# Patient Record
Sex: FEMALE | Race: WHITE | NOT HISPANIC OR LATINO | Employment: OTHER | URBAN - METROPOLITAN AREA
[De-identification: names, ages, dates, MRNs, and addresses within clinical notes are randomized per-mention and may not be internally consistent; named-entity substitution may affect disease eponyms.]

---

## 2017-01-06 DIAGNOSIS — F32.5 MAJOR DEPRESSION IN COMPLETE REMISSION (H): Primary | ICD-10-CM

## 2017-01-06 RX ORDER — QUETIAPINE FUMARATE 100 MG/1
TABLET, FILM COATED ORAL
Qty: 90 TABLET | Refills: 1 | Status: SHIPPED | OUTPATIENT
Start: 2017-01-06 | End: 2017-06-24

## 2017-01-06 NOTE — TELEPHONE ENCOUNTER
QUEtiapine (SEROQUEL) 100 MG tablet  Last Written Prescription Date: 11/16/2016  Last Fill Quantity: 90, # refills: 1  Last Office Visit with [facility], [facility] or [facility] prescribing provider: 11/03/2016       BP Readings from Last 3 Encounters:   11/03/16 108/68   04/21/16 118/82   04/05/16 120/80     Pulse Readings from Last 2 Encounters:   11/03/16 81   04/21/16 78     GLC       80   11/3/2016  WBC      7.6   4/3/2016  WBC      5.1   5/11/2011  RBC     4.57   4/3/2016  RBC     4.56   5/11/2011  HGB     14.5   4/3/2016  HCT     42.1   4/3/2016  No components found with this name: mct  MCV       92   4/3/2016  MCH     31.7   4/3/2016  MCHC     34.4   4/3/2016  RDW     13.9   4/3/2016  PLT      203   4/3/2016  CHOL      187   11/3/2016  HDL       63   11/3/2016  LDL      111   11/3/2016  TRIG       66   11/3/2016  CHOLHDLRATIO      2.6   12/4/2013

## 2017-02-02 ENCOUNTER — OFFICE VISIT (OUTPATIENT)
Dept: FAMILY MEDICINE | Facility: CLINIC | Age: 57
End: 2017-02-02
Payer: MEDICARE

## 2017-02-02 VITALS
HEART RATE: 74 BPM | RESPIRATION RATE: 16 BRPM | HEIGHT: 60 IN | WEIGHT: 106.5 LBS | BODY MASS INDEX: 20.91 KG/M2 | SYSTOLIC BLOOD PRESSURE: 112 MMHG | OXYGEN SATURATION: 98 % | DIASTOLIC BLOOD PRESSURE: 70 MMHG | TEMPERATURE: 97.1 F

## 2017-02-02 DIAGNOSIS — M72.2 PLANTAR FASCIITIS: Primary | ICD-10-CM

## 2017-02-02 PROCEDURE — 99213 OFFICE O/P EST LOW 20 MIN: CPT | Performed by: FAMILY MEDICINE

## 2017-02-02 NOTE — PATIENT INSTRUCTIONS
Good support shoes, new jel insoles (replace every 2 mo)  Gentle stretches.  No going barefoot for next few weeks at least

## 2017-02-02 NOTE — PROGRESS NOTES
SUBJECTIVE:                                                    Joann Gonzalez is a 56 year old female who presents to clinic today for the following health issues:    Pt here with her sister  Musculoskeletal problem/pain      Duration: x1 month    Description  Location: painful sores on bottom of both feet     Intensity:  severe    Accompanying signs and symptoms: tingling, warmth, swelling, redness and discoloration of feet     History  Previous similar problem: no   Previous evaluation:  none    Precipitating or alleviating factors:  Trauma or overuse: no   Aggravating factors include: walking    Therapies tried and outcome: nothing   Both feet are hurting with standing and walking.   Pain at night a stinging pain    PROBLEMS TO ADD ON...  None     Problem list and histories reviewed & adjusted, as indicated.  Additional history: as documented    Labs reviewed in EPIC  Problem list, Medication list, Allergies, and Medical/Social/Surgical histories reviewed in [facility] and updated as appropriate.    ROS:  CONSTITUTIONAL:NEGATIVE for fever, chills, change in weight  INTEGUMENTARY/SKIN: bruising sides of both heels  MUSCULOSKELETAL: POSITIVE  for bilateral heel pain     OBJECTIVE:                                                    /70 mmHg  Pulse 74  Temp(Src) 97.1  F (36.2  C) (Tympanic)  Resp 16  Ht 5' (1.524 m)  Wt 106 lb 8 oz (48.308 kg)  BMI 20.80 kg/m2  SpO2 98%  Body mass index is 20.8 kg/(m^2).  GENERAL APPEARANCE: healthy, alert and no distress  MS: extremities normal- no gross deformities noted  ORTHO: Foot Exam: Inspection:  no swelling diffuse, plantar and calcaneal  Tender::calcaneous, plantar fascia insertion   Non-tender:cuboid, navicular  Range of Motion:flexion of toes:  full, extension of toes  full    SKIN: ecchymoses - side of Lt heel, posterior Rt heel.  Callus Rt heel outside         ASSESSMENT/PLAN:                                                        ICD-10-CM    1. Plantar  fasciitis M72.2 PODIATRY/FOOT & ANKLE SURGERY REFERRAL    bilateral       Follow up with Provider - as needed.  Pt asks about pain medication.  Advised that I would not give her pain medication for this   Patient Instructions   Good support shoes, new jel insoles (replace every 2 mo)  Gentle stretches.  No going barefoot for next few weeks at least        Esau Ospina MD  [facility]

## 2017-02-02 NOTE — NURSING NOTE
Chief Complaint   Patient presents with     Musculoskeletal Problem     sores on bottom of both feet x1 month        Initial /70 mmHg  Pulse 74  Temp(Src) 97.1  F (36.2  C) (Tympanic)  Resp 16  Ht 5' (1.524 m)  Wt 106 lb 8 oz (48.308 kg)  BMI 20.80 kg/m2  SpO2 98% Estimated body mass index is 20.8 kg/(m^2) as calculated from the following:    Height as of this encounter: 5' (1.524 m).    Weight as of this encounter: 106 lb 8 oz (48.308 kg).  BP completed using cuff size: zee Mosley, CMA

## 2017-02-13 ENCOUNTER — OFFICE VISIT (OUTPATIENT)
Dept: PODIATRY | Facility: CLINIC | Age: 57
End: 2017-02-13
Payer: MEDICARE

## 2017-02-13 ENCOUNTER — HOSPITAL ENCOUNTER (OUTPATIENT)
Facility: CLINIC | Age: 57
End: 2017-02-13
Attending: COLON & RECTAL SURGERY | Admitting: COLON & RECTAL SURGERY
Payer: MEDICARE

## 2017-02-13 VITALS
HEIGHT: 60 IN | BODY MASS INDEX: 20.81 KG/M2 | SYSTOLIC BLOOD PRESSURE: 124 MMHG | DIASTOLIC BLOOD PRESSURE: 70 MMHG | WEIGHT: 106 LBS

## 2017-02-13 DIAGNOSIS — L84 CALLUS OF FOOT: ICD-10-CM

## 2017-02-13 DIAGNOSIS — M21.6X1 PRONATION OF BOTH FEET: Primary | ICD-10-CM

## 2017-02-13 DIAGNOSIS — M21.6X2 PRONATION OF BOTH FEET: Primary | ICD-10-CM

## 2017-02-13 PROCEDURE — 99203 OFFICE O/P NEW LOW 30 MIN: CPT | Performed by: PODIATRIST

## 2017-02-13 NOTE — MR AVS SNAPSHOT
After Visit Summary   2/13/2017    Joann Gonzalez    MRN: [id number]           Patient Information     Date Of Birth          1960        Visit Information        Provider Department      2/13/2017 10:30 AM Jake Nava DPM [facility]        Care Instructions    [facility] Shoes Orthotics: Super Feet,Tacco,Powersteps    Calluses, Corns, IPKs, Porokeratosis    When there is excessive friction or pressure on the skin, the body responds by making the skin thicker to protect the deeper structures from becoming exposed. While this works well to protect the deeper structures, the thickened skin can increase pressure and pain.    Flat, diffuse thickening are simple calluses and they are usually caused by friction.  Often these are the result of rubbing on a shoe or going barefoot.    Calluses with a central core between the toes are called corns. These result from prominent joints on adjacent toes rubbing together. Theses are a symptom of bone malalignment and will always recur unless the underlying bones are addressed surgically.    Calluses with a central core on the ball of the foot are usually IPKs (intractable plantar keratosis). These are caused by excessive pressure from the metatarsals, the bones that make up the ball of the foot. Often one of these bones is too long or too prominent.  Again, these will always recur unless the underlying bone issue is addressed. There is no cure for these. They will either go away by themselves, recur, or more could develop.    Regardless of what the diagnosis, most of these lesions can be kept comfortable with routine maintenance.   1. File them down with a pumice stone or callus file a couple times a week.   2. An electric callus removing device. Amope Pedi Perfect Electronic Pedicure Foot File and Callus Remover can be a good option.   3. Lotion can be applied to soften the callus. A urea based cream such as Kersal or Vanicream or  "thicker cream with shea butter are good options.  4. Toe spacers or toe covers can be used for corns, gel pads can be used for other lesions on the bottom of the foot.   If there is a surgical pathology noted, such as a prominent bone, often this needs to be addressed surgically to minimize recurrence. However, sometimes the lesion simply migrates to another spot after surgery, so it is not a guaranteed cure.                 Follow-ups after your visit        Who to contact     If you have questions or need follow up information about today's clinic visit or your schedule please contact [facility] directly at [phone].  Normal or non-critical lab and imaging results will be communicated to you by MyChart, letter or phone within 4 business days after the clinic has received the results. If you do not hear from us within 7 days, please contact the clinic through [employer] or phone. If you have a critical or abnormal lab result, we will notify you by phone as soon as possible.  Submit refill requests through [employer] or call your pharmacy and they will forward the refill request to us. Please allow 3 business days for your refill to be completed.          Additional Information About Your Visit        [employer] Information     [employer] lets you send messages to your doctor, view your test results, renew your prescriptions, schedule appointments and more. To sign up, go to [email or web address] . Click on \"Log in\" on the left side of the screen, which will take you to the Welcome page. Then click on \"Sign up Now\" on the right side of the page.     You will be asked to enter the access code listed below, as well as some personal information. Please follow the directions to create your username and password.     Your access code is: [email or web address]  Expires: 5/3/2017 11:52 AM     Your access code will  in 90 days. If you need help or a new code, please call your [place] clinic or " [phone].        Care EveryWhere ID     This is your Care EveryWhere ID. This could be used by other organizations to access your [place] medical records  [id number]        Your Vitals Were     Height BMI (Body Mass Index)                5' (1.524 m) 20.7 kg/m2           Blood Pressure from Last 3 Encounters:   02/13/17 124/70   02/02/17 112/70   11/03/16 108/68    Weight from Last 3 Encounters:   02/13/17 106 lb (48.1 kg)   02/02/17 106 lb 8 oz (48.3 kg)   11/03/16 113 lb (51.3 kg)              Today, you had the following     No orders found for display       Primary Care Provider Office Phone # Fax #    Esau Ospina [phone]       [facility] [address]  [facility] [address]        Thank you!     Thank you for choosing [facility]  for your care. Our goal is always to provide you with excellent care. Hearing back from our patients is one way we can continue to improve our services. Please take a few minutes to complete the written survey that you may receive in the mail after your visit with us. Thank you!             Your Updated Medication List - Protect others around you: Learn how to safely use, store and throw away your medicines at www.disposemymeds.org.          This list is accurate as of: 2/13/17 10:47 AM.  Always use your most recent med list.                   Brand Name Dispense Instructions for use    amLODIPine 10 MG tablet    NORVASC    90 tablet    TAKE 1 TABLET BY MOUTH EVERY DAY       cloNIDine 0.1 MG tablet    CATAPRES    360 tablet    TAKE 1 TABLET BY MOUTH TWICE DAILY. MAY INCREASE TO 2 TABLET TWICE DAILY IF BLOOD PRESSURE IS NOT BELOW 140/90       clopidogrel 75 MG tablet    PLAVIX    90 tablet    Take 1 tablet (75 mg) by mouth daily       FLUoxetine 20 MG capsule    PROzac    180 capsule    TAKE 2 CAPSULES BY MOUTH EVERY DAY       lisinopril 20 MG tablet    PRINIVIL/ZESTRIL    90 tablet    TAKE 1 TABLET BY MOUTH EVERY  DAY       LYRICA PO      Take 50 mg by mouth 2 times daily       Multi-vitamin Tabs tablet   Generic drug:  multivitamin, therapeutic with minerals      Take 1 tablet by mouth daily.       omeprazole 20 MG tablet     30 tablet    Take 1 tablet (20 mg) by mouth daily Take 30-60 minutes before a meal.       oxyCODONE-acetaminophen 5-325 MG per tablet    PERCOCET    20 tablet    Take 1 tablet by mouth every 4 hours as needed for moderate to severe pain       * QUEtiapine 25 MG tablet    SEROquel    90 tablet    TAKE ONE-HALF TABLET BY MOUTH TWICE DAILY AS DIRECTED       * QUEtiapine 100 MG tablet    SEROquel    90 tablet    TAKE 1 TABLET(100 MG) BY MOUTH AT BEDTIME       tiZANidine 4 MG tablet    ZANAFLEX    180 tablet    Take 1 tablet (4 mg) by mouth 2 times daily       traMADol 50 MG tablet    ULTRAM         vitamin D3 1000 UNITS Caps     30 capsule    Take 1 capsule by mouth daily.       * Notice:  This list has 2 medication(s) that are the same as other medications prescribed for you. Read the directions carefully, and ask your doctor or other care provider to review them with you.

## 2017-02-13 NOTE — PROGRESS NOTES
"  ASSESSMENT/PLAN:    Encounter Diagnoses   Name Primary?     Pronation of both feet Yes     Callus of foot      Although pain at night that she \"walks off,\"  I do not consider it ischemic pain. She has palpable pedal pulses bilaterally.      Pain seems to be related to the calluses, secondary to significant heel eversion and shoe wear irritation.     Recommendations:     File calluses down with a pumice stone  Foot cream for moisturizing  Over the counter inserts. My hope is that it will bring her heels into better frontal plane alignment with less stress to the skin on the lateral heel.   Cold application at bed time.        Body mass index is 20.7 kg/(m^2).      Jake Nava, SEBAS, FACFAS, MS    [facility] of Podiatry/Foot & Ankle Surgery      ____________________________________________________________________    HPI:       I was asked by Dr. Ospina to evaluate this patient regarding bilateral foot pain.     Chief Complaint: foot pain, bilateral  Onset of problem: months  Pain/ discomfort is described as:  Burning, throbbing, shooting  Ratin/10   Frequency:  dialy    The pain is made worse with walking  Previous treatment: none    She said that her pain is more at night and she gets up and \"walks it off.\"  *  Past Medical History   Diagnosis Date     Depression      Hypertension      Migraine 2013     Overdose      Stroke (H)      Unspecified cerebral artery occlusion with cerebral infarction 10/20/2012   *  *  Past Surgical History   Procedure Laterality Date     Hysterectomy       Cholecystectomy       Back surgery  2010 x 3 - Fusion     Back surgery       Hysterectomy, pap no longer indicated       hysterectomy     Gi surgery       Esophagoscopy, gastroscopy, duodenoscopy (egd), combined N/A 2015     Procedure: COMBINED ESOPHAGOSCOPY, GASTROSCOPY, DUODENOSCOPY (EGD);  Surgeon: Sree Scanlon MD;  Location:  GI     Appendectomy       done with hysterectomy     " Abdomen surgery  2008 - Gallbladder     Gastric bypass  2003     Gastric bypass     *  *  Current Outpatient Prescriptions   Medication Sig Dispense Refill     QUEtiapine (SEROQUEL) 100 MG tablet TAKE 1 TABLET(100 MG) BY MOUTH AT BEDTIME 90 tablet 1     QUEtiapine (SEROQUEL) 25 MG tablet TAKE ONE-HALF TABLET BY MOUTH TWICE DAILY AS DIRECTED 90 tablet 1     clopidogrel (PLAVIX) 75 MG tablet Take 1 tablet (75 mg) by mouth daily 90 tablet 2     FLUoxetine (PROZAC) 20 MG capsule TAKE 2 CAPSULES BY MOUTH EVERY  capsule 1     tiZANidine (ZANAFLEX) 4 MG tablet Take 1 tablet (4 mg) by mouth 2 times daily 180 tablet 0     cloNIDine (CATAPRES) 0.1 MG tablet TAKE 1 TABLET BY MOUTH TWICE DAILY. MAY INCREASE TO 2 TABLET TWICE DAILY IF BLOOD PRESSURE IS NOT BELOW 140/90 360 tablet 0     lisinopril (PRINIVIL,ZESTRIL) 20 MG tablet TAKE 1 TABLET BY MOUTH EVERY DAY 90 tablet 1     amLODIPine (NORVASC) 10 MG tablet TAKE 1 TABLET BY MOUTH EVERY DAY 90 tablet 3     oxyCODONE-acetaminophen (PERCOCET) 5-325 MG per tablet Take 1 tablet by mouth every 4 hours as needed for moderate to severe pain 20 tablet 0     traMADol (ULTRAM) 50 MG tablet        omeprazole 20 MG tablet Take 1 tablet (20 mg) by mouth daily Take 30-60 minutes before a meal. 30 tablet 1     Pregabalin (LYRICA PO) Take 50 mg by mouth 2 times daily        Cholecalciferol (VITAMIN D3) 1000 UNITS CAPS Take 1 capsule by mouth daily. 30 capsule 3     Multiple Vitamin (MULTI-VITAMIN) per tablet Take 1 tablet by mouth daily.         ROS:     A 10-point review of systems was performed and is positive for that noted in the HPI and as seen below.  All other areas are negative.     Numbness in feet?  no   Calf pain with walking? no  Recent foot/ankle injury? no  Weight change over past 12 months? 20# loss  Self perception as overweight? no  Recent flu-like symptoms? no  Joint pain other than feet ? no    Social History: Employment:  no;  Exercise/Physical activity:  no;   "Tobacco use:  yes  Social History     Social History     Marital status:      Spouse name: N/A     Number of children: N/A     Years of education: N/A     Occupational History     Not on file.     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     Drug use: No     Sexual activity: No     Other Topics Concern     Parent/Sibling W/ Cabg, Mi Or Angioplasty Before 65f 55m? No     Social History Narrative    ** Merged History Encounter **            Family history:  Family History   Problem Relation Age of Onset     Alzheimer Disease Mother      Hypertension Mother      Endocrine Disease Father        Rheumatoid arthritis:  no  Foot Problems: sibling with open blisters  Diabetes: sibling      EXAM:    Vitals: /70  Ht 5' (1.524 m)  Wt 106 lb (48.1 kg)  BMI 20.7 kg/m2  BMI: Body mass index is 20.7 kg/(m^2).  Height: 5' 0\"    Constitutional/ general:  Pt is in no apparent distress, appears well-nourished.  Cooperative with history and physical exam.     Vascular:  Pedal pulses are palpable bilaterally for both the DP and PT arteries.  CFT < 3 sec.  No edema.  Pedal hair growth noted.     Neuro:  Alert and oriented x 3. Coordinated gait.  Light touch sensation is intact to the L4, L5, S1 distributions. No obvious deficits.  No evidence of neurological-based weakness, spasticity, or contracture in the lower extremities.     Derm: Normal texture and turgor.  No erythema, ecchymosis, or cyanosis.  No open lesions.     Hyperkeratotic lesion bilateral heel, lateral side.  Tender on palpation.  These do not appear verrucoid.  No open lesion.    Musculoskeletal:    Lower extremity muscle strength is normal.  Patient is ambulatory without an assistive device or brace . Notable pronation with rearfoot eversion. "       Jake Nava DPM, FACFAS, MS    [facility] Department of Podiatry/Foot & Ankle Surgery

## 2017-02-13 NOTE — PATIENT INSTRUCTIONS
[facility] Shoes Orthotics: Super Feet,Tacco,Powersteps    Calluses, Corns, IPKs, Porokeratosis    When there is excessive friction or pressure on the skin, the body responds by making the skin thicker to protect the deeper structures from becoming exposed. While this works well to protect the deeper structures, the thickened skin can increase pressure and pain.    Flat, diffuse thickening are simple calluses and they are usually caused by friction.  Often these are the result of rubbing on a shoe or going barefoot.    Calluses with a central core between the toes are called corns. These result from prominent joints on adjacent toes rubbing together. Theses are a symptom of bone malalignment and will always recur unless the underlying bones are addressed surgically.    Calluses with a central core on the ball of the foot are usually IPKs (intractable plantar keratosis). These are caused by excessive pressure from the metatarsals, the bones that make up the ball of the foot. Often one of these bones is too long or too prominent.  Again, these will always recur unless the underlying bone issue is addressed. There is no cure for these. They will either go away by themselves, recur, or more could develop.    Regardless of what the diagnosis, most of these lesions can be kept comfortable with routine maintenance.   1. File them down with a pumice stone or callus file a couple times a week.   2. An electric callus removing device. Amope Pedi Perfect Electronic Pedicure Foot File and Callus Remover can be a good option.   3. Lotion can be applied to soften the callus. A urea based cream such as Kersal or Vanicream or thicker cream with shea butter are good options.  4. Toe spacers or toe covers can be used for corns, gel pads can be used for other lesions on the bottom of the foot.   If there is a surgical pathology noted, such as a prominent bone, often this needs to be addressed surgically to minimize recurrence. However,  sometimes the lesion simply migrates to another spot after surgery, so it is not a guaranteed cure.

## 2017-02-22 ENCOUNTER — TELEPHONE (OUTPATIENT)
Dept: FAMILY MEDICINE | Facility: CLINIC | Age: 57
End: 2017-02-22

## 2017-02-22 NOTE — TELEPHONE ENCOUNTER
Panel Management Review      Patient has the following on her problem list:     Depression / Dysthymia review  PHQ-9 SCORE 4/5/2016 4/21/2016 11/3/2016   Total Score - - -   Total Score - - -   Total Score 4 8 9      Patient is due for:  None      Composite cancer screening  Chart review shows that this patient is due/due soon for the following None  Summary:    Patient is due/failing the following:   COLONOSCOPY and MAMMOGRAM    Action needed:   Patient needs office visit for mammogram/colonoscopy.    Type of outreach:    Sent letter.    Questions for provider review:    None                                                                                                                                    Rahel Melgar LPN     Chart routed to Care Team .

## 2017-02-22 NOTE — LETTER
[facility]  [address]  [address]  [place] [address] [phone]                                                                                                           Joann Gonzalez  [address]   [place] [address]    February 22, 2017          Dear Joann Gonzalez,      We care about your well-being!  In order to ensure we are providing the best quality care, we have reviewed your chart and see that you are due for:     _____ Physical   _____ Pap Smear   __x___ Mammogram   _____ Diabetes Followup   _____ Hypertension Followup   _____ Cholesterol Followup (Provider Appointment)   _____ Cholesterol Test (Lab-Only Appointment)   __x___ Other:  Colonoscopy     We can assist you in scheduling these appointments at [phone].    If you have had (or plan to have) any of these tests done at a facility other than [facility] or a [facility], please have the results from these tests sent to your primary physician at [facility].                 Sincerely,    Esau Ospina MD

## 2017-02-24 ENCOUNTER — RADIANT APPOINTMENT (OUTPATIENT)
Dept: MAMMOGRAPHY | Facility: CLINIC | Age: 57
End: 2017-02-24
Payer: MEDICARE

## 2017-02-24 DIAGNOSIS — Z12.31 VISIT FOR SCREENING MAMMOGRAM: ICD-10-CM

## 2017-02-24 PROCEDURE — G0202 SCR MAMMO BI INCL CAD: HCPCS | Mod: TC

## 2017-03-01 ENCOUNTER — HOSPITAL ENCOUNTER (OUTPATIENT)
Dept: MAMMOGRAPHY | Facility: CLINIC | Age: 57
End: 2017-03-01
Attending: FAMILY MEDICINE
Payer: MEDICARE

## 2017-03-01 ENCOUNTER — HOSPITAL ENCOUNTER (OUTPATIENT)
Dept: MAMMOGRAPHY | Facility: CLINIC | Age: 57
Discharge: HOME | End: 2017-03-01
Attending: FAMILY MEDICINE | Admitting: FAMILY MEDICINE
Payer: MEDICARE

## 2017-03-01 DIAGNOSIS — R92.8 ABNORMAL MAMMOGRAM: ICD-10-CM

## 2017-03-01 PROCEDURE — 77061 BREAST TOMOSYNTHESIS UNI: CPT

## 2017-03-01 PROCEDURE — 76642 ULTRASOUND BREAST LIMITED: CPT | Mod: RT

## 2017-04-12 DIAGNOSIS — I10 ESSENTIAL HYPERTENSION: ICD-10-CM

## 2017-04-12 NOTE — TELEPHONE ENCOUNTER
lisinopril (PRINIVIL,ZESTRIL) 20 MG tablet      Last Written Prescription Date: 10/13/16  Last Fill Quantity: 90, # refills: 1  Last Office Visit with G, [facility] or [facility] prescribing provider: 2/2/17       Potassium   Date Value Ref Range Status   11/03/2016 3.7 3.4 - 5.3 mmol/L Final     Creatinine   Date Value Ref Range Status   11/03/2016 0.72 0.52 - 1.04 mg/dL Final     BP Readings from Last 3 Encounters:   02/13/17 124/70   02/02/17 112/70   11/03/16 108/68

## 2017-04-13 RX ORDER — LISINOPRIL 20 MG/1
TABLET ORAL
Qty: 90 TABLET | Refills: 1 | Status: SHIPPED | OUTPATIENT
Start: 2017-04-13 | End: 2018-01-26

## 2017-07-10 DIAGNOSIS — I10 HTN (HYPERTENSION): ICD-10-CM

## 2017-07-10 NOTE — TELEPHONE ENCOUNTER
amLODIPine (NORVASC) 10 MG      Last Written Prescription Date: 7/18/16  Last Fill Quantity: 90, # refills: 3    Last Office Visit with [facility], [facility] or [facility] prescribing provider:  2/13/17   Future Office Visit:        BP Readings from Last 3 Encounters:   02/13/17 124/70   02/02/17 112/70   11/03/16 108/68

## 2017-07-11 RX ORDER — AMLODIPINE BESYLATE 10 MG/1
TABLET ORAL
Qty: 90 TABLET | Refills: 1 | Status: SHIPPED | OUTPATIENT
Start: 2017-07-11 | End: 2018-01-05

## 2017-07-11 NOTE — TELEPHONE ENCOUNTER
Prescription approved per [facility] Refill Protocol.  Adrianne Irving RN- Triage [employer]

## 2017-07-31 ENCOUNTER — OFFICE VISIT (OUTPATIENT)
Dept: FAMILY MEDICINE | Facility: CLINIC | Age: 57
End: 2017-07-31
Payer: MEDICARE

## 2017-07-31 VITALS
SYSTOLIC BLOOD PRESSURE: 102 MMHG | BODY MASS INDEX: 20.12 KG/M2 | DIASTOLIC BLOOD PRESSURE: 78 MMHG | HEART RATE: 72 BPM | RESPIRATION RATE: 12 BRPM | HEIGHT: 60 IN | WEIGHT: 102.5 LBS | TEMPERATURE: 97.9 F | OXYGEN SATURATION: 100 %

## 2017-07-31 DIAGNOSIS — I10 BENIGN ESSENTIAL HYPERTENSION: ICD-10-CM

## 2017-07-31 DIAGNOSIS — G89.29 CHRONIC MIDLINE LOW BACK PAIN WITHOUT SCIATICA: ICD-10-CM

## 2017-07-31 DIAGNOSIS — F32.5 MAJOR DEPRESSIVE DISORDER, SINGLE EPISODE IN FULL REMISSION (H): ICD-10-CM

## 2017-07-31 DIAGNOSIS — Z86.0100 HISTORY OF COLONIC POLYPS: ICD-10-CM

## 2017-07-31 DIAGNOSIS — M96.1 POSTLAMINECTOMY SYNDROME, LUMBAR REGION: ICD-10-CM

## 2017-07-31 DIAGNOSIS — M54.50 CHRONIC MIDLINE LOW BACK PAIN WITHOUT SCIATICA: ICD-10-CM

## 2017-07-31 DIAGNOSIS — Z12.11 SPECIAL SCREENING FOR MALIGNANT NEOPLASMS, COLON: ICD-10-CM

## 2017-07-31 DIAGNOSIS — Z00.00 ENCOUNTER FOR ROUTINE ADULT HEALTH EXAMINATION WITHOUT ABNORMAL FINDINGS: Primary | ICD-10-CM

## 2017-07-31 DIAGNOSIS — F41.1 ANXIETY STATE: ICD-10-CM

## 2017-07-31 LAB
ALBUMIN UR-MCNC: NEGATIVE MG/DL
APPEARANCE UR: CLEAR
BILIRUB UR QL STRIP: NEGATIVE
COLOR UR AUTO: YELLOW
GLUCOSE UR STRIP-MCNC: NEGATIVE MG/DL
HGB UR QL STRIP: ABNORMAL
KETONES UR STRIP-MCNC: NEGATIVE MG/DL
LEUKOCYTE ESTERASE UR QL STRIP: NEGATIVE
NITRATE UR QL: NEGATIVE
PH UR STRIP: 5 PH (ref 5–7)
RBC #/AREA URNS AUTO: NORMAL /HPF (ref 0–2)
SP GR UR STRIP: <=1.005 (ref 1–1.03)
URN SPEC COLLECT METH UR: ABNORMAL
UROBILINOGEN UR STRIP-ACNC: 1 EU/DL (ref 0.2–1)
WBC #/AREA URNS AUTO: NORMAL /HPF (ref 0–2)

## 2017-07-31 PROCEDURE — 99396 PREV VISIT EST AGE 40-64: CPT | Performed by: FAMILY MEDICINE

## 2017-07-31 PROCEDURE — 80061 LIPID PANEL: CPT | Performed by: FAMILY MEDICINE

## 2017-07-31 PROCEDURE — 80053 COMPREHEN METABOLIC PANEL: CPT | Performed by: FAMILY MEDICINE

## 2017-07-31 PROCEDURE — 36415 COLL VENOUS BLD VENIPUNCTURE: CPT | Performed by: FAMILY MEDICINE

## 2017-07-31 PROCEDURE — 81001 URINALYSIS AUTO W/SCOPE: CPT | Performed by: FAMILY MEDICINE

## 2017-07-31 ASSESSMENT — ANXIETY QUESTIONNAIRES
6. BECOMING EASILY ANNOYED OR IRRITABLE: MORE THAN HALF THE DAYS
5. BEING SO RESTLESS THAT IT IS HARD TO SIT STILL: NOT AT ALL
7. FEELING AFRAID AS IF SOMETHING AWFUL MIGHT HAPPEN: SEVERAL DAYS
GAD7 TOTAL SCORE: 5
2. NOT BEING ABLE TO STOP OR CONTROL WORRYING: NOT AT ALL
1. FEELING NERVOUS, ANXIOUS, OR ON EDGE: SEVERAL DAYS
3. WORRYING TOO MUCH ABOUT DIFFERENT THINGS: NOT AT ALL

## 2017-07-31 ASSESSMENT — PATIENT HEALTH QUESTIONNAIRE - PHQ9: 5. POOR APPETITE OR OVEREATING: SEVERAL DAYS

## 2017-07-31 NOTE — MR AVS SNAPSHOT
After Visit Summary   7/31/2017    Joann Gonzalez    MRN: [id number]           Patient Information     Date Of Birth          1960        Visit Information        Provider Department      7/31/2017 11:00 AM Esau Ospina MD [facility]        Today's Diagnoses     Encounter for routine adult health examination without abnormal findings    -  1    Benign essential hypertension        Chronic midline low back pain without sciatica        Special screening for malignant neoplasms, colon        Postlaminectomy syndrome, lumbar region        History of colonic polyps        Major depressive disorder, single episode in full remission (H)        Anxiety state          Care Instructions      Preventive Health Recommendations  Female Ages 50 - 64    Yearly exam: See your health care provider every year in order to  o Review health changes.   o Discuss preventive care.    o Review your medicines if your doctor has prescribed any.      Get a Pap test every three years (unless you have an abnormal result and your provider advises testing more often).    If you get Pap tests with HPV test, you only need to test every 5 years, unless you have an abnormal result.     You do not need a Pap test if your uterus was removed (hysterectomy) and you have not had cancer.    You should be tested each year for STDs (sexually transmitted diseases) if you're at risk.     Have a mammogram every 1 to 2 years.    Have a colonoscopy at age 50, or have a yearly FIT test (stool test). These exams screen for colon cancer.      Have a cholesterol test every 5 years, or more often if advised.    Have a diabetes test (fasting glucose) every three years. If you are at risk for diabetes, you should have this test more often.     If you are at risk for osteoporosis (brittle bone disease), think about having a bone density scan (DEXA).    Shots: Get a flu shot each year. Get a tetanus shot every 10  years.    Nutrition:     Eat at least 5 servings of fruits and vegetables each day.    Eat whole-grain bread, whole-wheat pasta and brown rice instead of white grains and rice.    Talk to your provider about Calcium and Vitamin D.     Lifestyle    Exercise at least 150 minutes a week (30 minutes a day, 5 days a week). This will help you control your weight and prevent disease.    Limit alcohol to one drink per day.    No smoking.     Wear sunscreen to prevent skin cancer.     See your dentist every six months for an exam and cleaning.    See your eye doctor every 1 to 2 years.    Referral to [facility] Scheduling number [phone]          Follow-ups after your visit        Additional Services     GASTROENTEROLOGY ADULT REF PROCEDURE ONLY       Last Lab Result: Creatinine (mg/dL)       Date                     Value                 11/03/2016               0.72             ----------  Body mass index is 19.85 kg/(m^2).     Needed:  No  Language:  English    Patient will be contacted to schedule procedure.     Please be aware that coverage of these services is subject to the terms and limitations of your health insurance plan.  Call member services at your health plan with any benefit or coverage questions.  Any procedures must be performed at a [facility] facility OR coordinated by your clinic's referral office.    Please bring the following with you to your appointment:    (1) Any X-Rays, CTs or MRIs which have been performed.  Contact the facility where they were done to arrange for  prior to your scheduled appointment.    (2) List of current medications   (3) This referral request   (4) Any documents/labs given to you for this referral                  Who to contact     If you have questions or need follow up information about today's clinic visit or your schedule please contact [facility] directly at [phone].  Normal or non-critical lab and  "imaging results will be communicated to you by MyChart, letter or phone within 4 business days after the clinic has received the results. If you do not hear from us within 7 days, please contact the clinic through MyChart or phone. If you have a critical or abnormal lab result, we will notify you by phone as soon as possible.  Submit refill requests through [employer] or call your pharmacy and they will forward the refill request to us. Please allow 3 business days for your refill to be completed.          Additional Information About Your Visit        Care EveryWhere ID     This is your Care EveryWhere ID. This could be used by other organizations to access your [facility] medical records  [id number]        Your Vitals Were     Pulse Temperature Respirations Height Pulse Oximetry BMI (Body Mass Index)    72 97.9  F (36.6  C) (Tympanic) 12 5' 0.25\" (1.53 m) 100% 19.85 kg/m2       Blood Pressure from Last 3 Encounters:   07/31/17 102/78   02/13/17 124/70   02/02/17 112/70    Weight from Last 3 Encounters:   07/31/17 102 lb 8 oz (46.5 kg)   02/13/17 106 lb (48.1 kg)   02/02/17 106 lb 8 oz (48.3 kg)              We Performed the Following     *UA reflex to Microscopic     Comprehensive metabolic panel     GASTROENTEROLOGY ADULT REF PROCEDURE ONLY     Lipid panel reflex to direct LDL          Today's Medication Changes          These changes are accurate as of: 7/31/17 11:28 AM.  If you have any questions, ask your nurse or doctor.               These medicines have changed or have updated prescriptions.        Dose/Directions    FLUoxetine 20 MG capsule   Commonly known as:  PROzac   This may have changed:  See the new instructions.   Used for:  Major depressive disorder, single episode in full remission (H), Anxiety state   Changed by:  Esau Ospina MD        TAKE 2 CAPSULES BY MOUTH EVERY DAY   Quantity:  180 capsule   Refills:  1            Where to get your medicines      These medications were sent to [id number] - [facility] [address] AT [facility] [address]  [address], [facility] [address]     Phone:  [phone]     FLUoxetine 20 MG capsule                Primary Care Provider Office Phone # Fax #    Esau Ospina [phone]       [facility] [address]  [facility] [address]        Equal Access to Services     JUAN CARLOS SCHMIDT : Hadii aad ku hadasho Soomaali, waaxda luqadaha, qaybta kaalmada adeegyada, waxay idiin hayaan adeeg kharash la'aan . So [facility] [phone].    ATENCIÓN: Si habla español, tiene a lugo disposición servicios gratuitos de asistencia lingüística. Llame al [phone].    We comply with applicable federal civil rights laws and [place] laws. We do not discriminate on the basis of race, color, national origin, age, disability sex, sexual orientation or gender identity.            Thank you!     Thank you for choosing [facility]  for your care. Our goal is always to provide you with excellent care. Hearing back from our patients is one way we can continue to improve our services. Please take a few minutes to complete the written survey that you may receive in the mail after your visit with us. Thank you!             Your Updated Medication List - Protect others around you: Learn how to safely use, store and throw away your medicines at www.disposemymeds.org.          This list is accurate as of: 7/31/17 11:28 AM.  Always use your most recent med list.                   Brand Name Dispense Instructions for use Diagnosis    amLODIPine 10 MG tablet    NORVASC    90 tablet    TAKE 1 TABLET BY MOUTH EVERY DAY    HTN (hypertension)       cloNIDine 0.1 MG tablet    CATAPRES    360 tablet    TAKE 1 TABLET BY MOUTH TWICE DAILY. MAY INCREASE TO 2 TABLET TWICE DAILY IF BLOOD PRESSURE IS NOT BELOW 140/90    Benign essential hypertension       clopidogrel 75 MG tablet    PLAVIX    90 tablet    Take 1  tablet (75 mg) by mouth daily    Transient cerebral ischemia, unspecified type       FLUoxetine 20 MG capsule    PROzac    180 capsule    TAKE 2 CAPSULES BY MOUTH EVERY DAY    Major depressive disorder, single episode in full remission (H), Anxiety state       lisinopril 20 MG tablet    PRINIVIL/ZESTRIL    90 tablet    TAKE 1 TABLET BY MOUTH EVERY DAY    Essential hypertension       LYRICA PO      Take 50 mg by mouth 2 times daily        Multi-vitamin Tabs tablet   Generic drug:  multivitamin, therapeutic with minerals      Take 1 tablet by mouth daily.        omeprazole 20 MG tablet     30 tablet    Take 1 tablet (20 mg) by mouth daily Take 30-60 minutes before a meal.    Epigastric pain       oxyCODONE-acetaminophen 5-325 MG per tablet    PERCOCET    20 tablet    Take 1 tablet by mouth every 4 hours as needed for moderate to severe pain    Contusion of left knee, initial encounter       * QUEtiapine 25 MG tablet    SEROquel    90 tablet    TAKE ONE-HALF TABLET BY MOUTH TWICE DAILY AS DIRECTED    Major depression in complete remission (H)       * QUEtiapine 100 MG tablet    SEROquel    90 tablet    TAKE 1 TABLET(100 MG) BY MOUTH AT BEDTIME    Major depression in complete remission (H)       tiZANidine 4 MG tablet    ZANAFLEX    180 tablet    Take 1 tablet (4 mg) by mouth 2 times daily    Chronic bilateral low back pain without sciatica       traMADol 50 MG tablet    ULTRAM          vitamin D3 1000 UNITS Caps     30 capsule    Take 1 capsule by mouth daily.    Vitamin D deficiency       * Notice:  This list has 2 medication(s) that are the same as other medications prescribed for you. Read the directions carefully, and ask your doctor or other care provider to review them with you.

## 2017-07-31 NOTE — LETTER
Joann Gonzalez  [address]   [facility] [address]        August 3, 2017          Dear ,    We are writing to inform you of your test results.    Cholesterol panel shows LDL is below goal of 130 but above goal of 100  Metabolic panel is essentially normal, nothing significant  Urine tests are normal, nothing significant    Resulted Orders   Lipid panel reflex to direct LDL   Result Value Ref Range    Cholesterol 201 (H) <200 mg/dL      Comment:      Desirable:       <200 mg/dl    Triglycerides 115 <150 mg/dL      Comment:      Fasting specimen    HDL Cholesterol 63 >49 mg/dL    LDL Cholesterol Calculated 115 (H) <100 mg/dL      Comment:      Above desirable:  100-129 mg/dl   Borderline High:  130-159 mg/dL   High:             160-189 mg/dL   Very high:       >189 mg/dl      Non HDL Cholesterol 138 (H) <130 mg/dL      Comment:      Above Desirable:  130-159 mg/dl   Borderline high:  160-189 mg/dl   High:             190-219 mg/dl   Very high:       >219 mg/dl     Comprehensive metabolic panel   Result Value Ref Range    Sodium 134 133 - 144 mmol/L    Potassium 3.9 3.4 - 5.3 mmol/L    Chloride 98 94 - 109 mmol/L    Carbon Dioxide 26 20 - 32 mmol/L    Anion Gap 10 3 - 14 mmol/L    Glucose 69 (L) 70 - 99 mg/dL      Comment:      Fasting specimen    Urea Nitrogen 6 (L) 7 - 30 mg/dL    Creatinine 0.51 (L) 0.52 - 1.04 mg/dL    GFR Estimate >90  Non  GFR Calc   >60 mL/min/1.7m2    GFR Estimate If Black >90   GFR Calc   >60 mL/min/1.7m2    Calcium 9.2 8.5 - 10.1 mg/dL    Bilirubin Total 0.3 0.2 - 1.3 mg/dL    Albumin 3.7 3.4 - 5.0 g/dL    Protein Total 7.8 6.8 - 8.8 g/dL    Alkaline Phosphatase 153 (H) 40 - 150 U/L    ALT 14 0 - 50 U/L    AST 15 0 - 45 U/L   *UA reflex to Microscopic   Result Value Ref Range    Color Urine Yellow     Appearance Urine Clear     Glucose Urine Negative NEG mg/dL    Bilirubin Urine Negative NEG    Ketones Urine Negative NEG mg/dL     Specific Gravity Urine <=1.005 1.003 - 1.035    Blood Urine Trace (A) NEG    pH Urine 5.0 5.0 - 7.0 pH    Protein Albumin Urine Negative NEG mg/dL    Urobilinogen Urine 1.0 0.2 - 1.0 EU/dL    Nitrite Urine Negative NEG    Leukocyte Esterase Urine Negative NEG    Source Midstream Urine    Urine Microscopic   Result Value Ref Range    WBC Urine O - 2 0 - 2 /HPF    RBC Urine O - 2 0 - 2 /HPF       If you have any questions or concerns, please call the clinic at the number listed above.       Sincerely,        Esau Ospina MD

## 2017-07-31 NOTE — PATIENT INSTRUCTIONS

## 2017-07-31 NOTE — NURSING NOTE
"Chief Complaint   Patient presents with     Physical     fasting     Hypertension     f/u     Headache     f/u     Depression     f/u     Anxiety     f/u       Initial /78 (BP Location: Left arm, Patient Position: Chair, Cuff Size: Adult Regular)  Pulse 72  Temp 97.9  F (36.6  C) (Tympanic)  Resp 12  Ht 5' 0.25\" (1.53 m)  Wt 102 lb 8 oz (46.5 kg)  SpO2 100%  BMI 19.85 kg/m2 Estimated body mass index is 19.85 kg/(m^2) as calculated from the following:    Height as of this encounter: 5' 0.25\" (1.53 m).    Weight as of this encounter: 102 lb 8 oz (46.5 kg).  Medication Reconciliation: complete     Princess PASTOR Solis CMA      "

## 2017-08-01 LAB
ALBUMIN SERPL-MCNC: 3.7 G/DL (ref 3.4–5)
ALP SERPL-CCNC: 153 U/L (ref 40–150)
ALT SERPL W P-5'-P-CCNC: 14 U/L (ref 0–50)
ANION GAP SERPL CALCULATED.3IONS-SCNC: 10 MMOL/L (ref 3–14)
AST SERPL W P-5'-P-CCNC: 15 U/L (ref 0–45)
BILIRUB SERPL-MCNC: 0.3 MG/DL (ref 0.2–1.3)
BUN SERPL-MCNC: 6 MG/DL (ref 7–30)
CALCIUM SERPL-MCNC: 9.2 MG/DL (ref 8.5–10.1)
CHLORIDE SERPL-SCNC: 98 MMOL/L (ref 94–109)
CHOLEST SERPL-MCNC: 201 MG/DL
CO2 SERPL-SCNC: 26 MMOL/L (ref 20–32)
CREAT SERPL-MCNC: 0.51 MG/DL (ref 0.52–1.04)
GFR SERPL CREATININE-BSD FRML MDRD: ABNORMAL ML/MIN/1.7M2
GLUCOSE SERPL-MCNC: 69 MG/DL (ref 70–99)
HDLC SERPL-MCNC: 63 MG/DL
LDLC SERPL CALC-MCNC: 115 MG/DL
NONHDLC SERPL-MCNC: 138 MG/DL
POTASSIUM SERPL-SCNC: 3.9 MMOL/L (ref 3.4–5.3)
PROT SERPL-MCNC: 7.8 G/DL (ref 6.8–8.8)
SODIUM SERPL-SCNC: 134 MMOL/L (ref 133–144)
TRIGL SERPL-MCNC: 115 MG/DL

## 2017-08-01 ASSESSMENT — ANXIETY QUESTIONNAIRES: GAD7 TOTAL SCORE: 5

## 2017-08-01 ASSESSMENT — PATIENT HEALTH QUESTIONNAIRE - PHQ9: SUM OF ALL RESPONSES TO PHQ QUESTIONS 1-9: 8

## 2017-09-13 ENCOUNTER — TRANSFERRED RECORDS (OUTPATIENT)
Dept: HEALTH INFORMATION MANAGEMENT | Facility: CLINIC | Age: 57
End: 2017-09-13

## 2017-09-14 ENCOUNTER — TRANSFERRED RECORDS (OUTPATIENT)
Dept: HEALTH INFORMATION MANAGEMENT | Facility: CLINIC | Age: 57
End: 2017-09-14

## 2017-09-21 ENCOUNTER — OFFICE VISIT (OUTPATIENT)
Dept: FAMILY MEDICINE | Facility: CLINIC | Age: 57
End: 2017-09-21
Payer: MEDICARE

## 2017-09-21 VITALS
SYSTOLIC BLOOD PRESSURE: 110 MMHG | HEART RATE: 76 BPM | BODY MASS INDEX: 20.53 KG/M2 | WEIGHT: 106 LBS | DIASTOLIC BLOOD PRESSURE: 60 MMHG | TEMPERATURE: 98.6 F | RESPIRATION RATE: 16 BRPM | OXYGEN SATURATION: 98 %

## 2017-09-21 DIAGNOSIS — Z86.73 HISTORY OF CVA (CEREBROVASCULAR ACCIDENT): ICD-10-CM

## 2017-09-21 DIAGNOSIS — K43.9 VENTRAL HERNIA WITHOUT OBSTRUCTION OR GANGRENE: ICD-10-CM

## 2017-09-21 DIAGNOSIS — G89.29 CHRONIC MIDLINE LOW BACK PAIN WITHOUT SCIATICA: Primary | ICD-10-CM

## 2017-09-21 DIAGNOSIS — R26.89 BALANCE PROBLEMS: ICD-10-CM

## 2017-09-21 DIAGNOSIS — M96.1 POSTLAMINECTOMY SYNDROME, LUMBAR REGION: ICD-10-CM

## 2017-09-21 DIAGNOSIS — M54.50 CHRONIC MIDLINE LOW BACK PAIN WITHOUT SCIATICA: Primary | ICD-10-CM

## 2017-09-21 PROCEDURE — 99214 OFFICE O/P EST MOD 30 MIN: CPT | Performed by: FAMILY MEDICINE

## 2017-09-21 NOTE — PROGRESS NOTES
SUBJECTIVE:   Joann Gonzalez is a 57 year old female who presents to clinic today for the following health issues:      Forms      Duration: due end of Sept    Description (location/character/radiation): Needs to renew handicap stickers. Requesting form from provider.    Intensity:  moderate    Accompanying signs and symptoms:     History (similar episodes/previous evaluation):     Precipitating or alleviating factors: None    Therapies tried and outcome: None   Pt needs to hold on to someone often.  She does use cane at times       Abdominal wall hernia      Duration: months    Description (location/character/radiation): bulging Lt lower abdomen    Intensity:  moderate    Accompanying signs and symptoms: none    History (similar episodes/previous evaluation): worsened in past several months    Precipitating or alleviating factors: None    Therapies tried and outcome: None   When she had her colonoscopy the gastroenterologist confirmed that hernia was present  Bulges more when she lifts or carries anything    Cerebrovascular Follow-up      Patient history: ischemic cerebrovascular incident    Residual symptoms: Difficult walking    Worsened or new symptoms since last visit: No    Daily aspirin use: Yes    Hypertension controlled: Yes    Back Pain Follow Up      Description:   Location of pain:  center  Character of pain: dull ache  Pain radiation: Does not radiate  Since last visit, pain is:  unchanged  New numbness or weakness in legs, not attributed to pain:  no     Intensity: moderate    History:   Pain interferes with job: Not applicable  Therapies tried without relief: cold  Therapies tried with relief: opioids     Pt has been being followed at pain clinic      Accompanying Signs & Symptoms:  Risk of Fracture:  None  Risk of Cauda Equina:  None  Risk of Infection:  None  Risk of Cancer:  None              Problem list and histories reviewed & adjusted, as indicated.  Additional history: as documented    Labs  reviewed in EPIC    Reviewed and updated as needed this visit by clinical staffTobacco  Allergies  Meds  Problems  Med Hx  Surg Hx  Fam Hx  Soc Hx        Reviewed and updated as needed this visit by Provider  Allergies  Meds  Problems         ROS:  CONSTITUTIONAL:NEGATIVE for fever, chills, change in weight  CV: NEGATIVE for chest pain, palpitations or peripheral edema  GI: NEGATIVE for nausea, abdominal pain, heartburn, or change in bowel habits  MUSCULOSKELETAL: POSITIVE  for back pain low back  NEURO: POSITIVE for dizziness/lightheadedness and gait disturbance    OBJECTIVE:                                                    /60 (BP Location: Right arm, Patient Position: Chair, Cuff Size: Adult Regular)  Pulse 76  Temp 98.6  F (37  C) (Oral)  Resp 16  Wt 106 lb (48.1 kg)  SpO2 98%  BMI 20.53 kg/m2  Body mass index is 20.53 kg/(m^2).  GENERAL APPEARANCE: healthy, alert and no distress  CV: regular rates and rhythm, normal S1 S2, no S3 or S4 and no murmur, click or rub  ABDOMEN: soft, nontender, without hepatosplenomegaly or masses, bowel sounds normal and ventral wall hernia is present on Lt lower abdomen  MS: extremities normal- no gross deformities noted  NEURO: Normal strength and tone, mentation intact, speech normal and gait abnormal unsteady   PSYCH: mentation appears normal and affect normal/bright    Diagnostic test results:  none        ASSESSMENT/PLAN:                                                        ICD-10-CM    1. Chronic midline low back pain without sciatica M54.5     G89.29    2. Postlaminectomy syndrome, lumbar region M96.1    3. Ventral hernia without obstruction or gangrene K43.9 GENERAL SURG ADULT REFERRAL   4. History of CVA (cerebrovascular accident) 10-12 w residual Rtdominant  sided weakness Z86.73    5. Balance problems R26.89        Handicapped parking permit application completed for 3 yr thru Sept 2020 for balance and low back pain.     Follow up with Provider  - 3 mo   Patient Instructions   Use good body mechanics with lifting avoid straining      Esau Ospina MD  [facility]

## 2017-09-21 NOTE — NURSING NOTE
"Chief Complaint   Patient presents with     Forms       Initial /60 (BP Location: Right arm, Patient Position: Chair, Cuff Size: Adult Regular)  Pulse 76  Temp 98.6  F (37  C) (Oral)  Resp 16  Wt 106 lb (48.1 kg)  SpO2 98%  BMI 20.53 kg/m2 Estimated body mass index is 20.53 kg/(m^2) as calculated from the following:    Height as of 7/31/17: 5' 0.25\" (1.53 m).    Weight as of this encounter: 106 lb (48.1 kg).  Medication Reconciliation: complete     Princess PASTOR Solis CMA    Advance Care Planning 9/21/2017: ACP Review of Chart / Resources Provided:  Reviewed chart for advance care plan.  Joann Gonzalez has no plan or code status on file. Discussed available resources and provided with information. Added by Princess PASTOR Solis        "

## 2017-09-21 NOTE — MR AVS SNAPSHOT
After Visit Summary   9/21/2017    Joann Gonzalez    MRN: [id number]           Patient Information     Date Of Birth          1960        Visit Information        Provider Department      9/21/2017 4:15 PM Esau Ospina MD [facility]        Today's Diagnoses     Chronic midline low back pain without sciatica    -  1    Postlaminectomy syndrome, lumbar region        Ventral hernia without obstruction or gangrene           Follow-ups after your visit        Additional Services     GENERAL SURG ADULT REFERRAL       Your provider has referred you to: AMMON: [facility] Marilou Swann [phone]   [email or web address]    Please be aware that coverage of these services is subject to the terms and limitations of your health insurance plan.  Call member services at your health plan with any benefit or coverage questions.      Please bring the following with you to your appointment:    (1) Any X-Rays, CTs or MRIs which have been performed.  Contact the facility where they were done to arrange for  prior to your scheduled appointment.   (2) List of current medications   (3) This referral request   (4) Any documents/labs given to you for this referral                  Who to contact     If you have questions or need follow up information about today's clinic visit or your schedule please contact [facility] directly at [phone].  Normal or non-critical lab and imaging results will be communicated to you by MyChart, letter or phone within 4 business days after the clinic has received the results. If you do not hear from us within 7 days, please contact the clinic through MyChart or phone. If you have a critical or abnormal lab result, we will notify you by phone as soon as possible.  Submit refill requests through YepLike! or call your pharmacy and they will forward the refill request to us.  Please allow 3 business days for your refill to be completed.          Additional Information About Your Visit        Care EveryWhere ID     This is your Care EveryWhere ID. This could be used by other organizations to access your [place] medical records  [id number]        Your Vitals Were     Pulse Temperature Respirations Pulse Oximetry BMI (Body Mass Index)       76 98.6  F (37  C) (Oral) 16 98% 20.53 kg/m2        Blood Pressure from Last 3 Encounters:   09/21/17 110/60   07/31/17 102/78   02/13/17 124/70    Weight from Last 3 Encounters:   09/21/17 106 lb (48.1 kg)   07/31/17 102 lb 8 oz (46.5 kg)   02/13/17 106 lb (48.1 kg)              We Performed the Following     GENERAL SURG ADULT REFERRAL        Primary Care Provider Office Phone # Fax #    Esau Ospina [phone] [address] [facility] [address]        Equal Access to Services     BETH SCHMIDT : Hadii aad ku hadasho Soomaali, waaxda luqadaha, qaybta kaalmada adeegyada, waxay idiin hayalberton barry hubbard . So [facility] [phone].    ATENCIÓN: Si habla español, tiene a lugo disposición servicios gratuitos de asistencia lingüística. Llame al [phone].    We comply with applicable federal civil rights laws and [place] laws. We do not discriminate on the basis of race, color, national origin, age, disability sex, sexual orientation or gender identity.            Thank you!     [facility] Our goal is always to provide you with excellent care. Hearing back from our patients is one way we can continue to improve our services. Please take a few minutes to complete the written survey that you may receive in the mail after your visit with us. Thank you!             Your Updated Medication List - Protect others around you: Learn how to safely use, store and throw away your medicines at www.disposemymeds.org.          This list is accurate as of: 9/21/17   4:25 PM.  Always use your most recent med list.                   Brand Name Dispense Instructions for use Diagnosis    amLODIPine 10 MG tablet    NORVASC    90 tablet    TAKE 1 TABLET BY MOUTH EVERY DAY    HTN (hypertension)       cloNIDine 0.1 MG tablet    CATAPRES    360 tablet    TAKE 1 TABLET BY MOUTH TWICE DAILY. MAY INCREASE TO 2 TABLET TWICE DAILY IF BLOOD PRESSURE IS NOT BELOW 140/90    Benign essential hypertension       clopidogrel 75 MG tablet    PLAVIX    90 tablet    TAKE 1 TABLET(75 MG) BY MOUTH DAILY    Transient cerebral ischemia, unspecified type       * FLUoxetine 20 MG capsule    PROzac    180 capsule    TAKE 2 CAPSULES BY MOUTH EVERY DAY    Major depressive disorder, single episode in full remission (H), Anxiety state       * FLUoxetine 20 MG capsule    PROzac    180 capsule    TAKE 2 CAPSULES BY MOUTH EVERY DAY    Major depressive disorder, single episode in full remission (H), Anxiety state       lisinopril 20 MG tablet    PRINIVIL/ZESTRIL    90 tablet    TAKE 1 TABLET BY MOUTH EVERY DAY    Essential hypertension       LYRICA PO      Take 50 mg by mouth 2 times daily        Multi-vitamin Tabs tablet   Generic drug:  multivitamin, therapeutic with minerals      Take 1 tablet by mouth daily.        omeprazole 20 MG tablet     30 tablet    Take 1 tablet (20 mg) by mouth daily Take 30-60 minutes before a meal.    Epigastric pain       oxyCODONE-acetaminophen 5-325 MG per tablet    PERCOCET    20 tablet    Take 1 tablet by mouth every 4 hours as needed for moderate to severe pain    Contusion of left knee, initial encounter       * QUEtiapine 100 MG tablet    SEROquel    90 tablet    TAKE 1 TABLET(100 MG) BY MOUTH AT BEDTIME    Major depression in complete remission (H)       * QUEtiapine 25 MG tablet    SEROquel    90 tablet    TAKE 1/2 TABLET BY MOUTH TWICE DAILY AS DIRECTED    Major depression in complete remission (H)       * QUEtiapine 100 MG tablet    SEROquel    30 tablet    TAKE 1 TABLET BY  MOUTH EVERY NIGHT AT BEDTIME    Major depression in complete remission (H)       tiZANidine 4 MG tablet    ZANAFLEX    180 tablet    TAKE 1 TABLET(4 MG) BY MOUTH TWICE DAILY    Chronic bilateral low back pain without sciatica       traMADol 50 MG tablet    ULTRAM          vitamin D3 1000 UNITS Caps     30 capsule    Take 1 capsule by mouth daily.    Vitamin D deficiency       * Notice:  This list has 5 medication(s) that are the same as other medications prescribed for you. Read the directions carefully, and ask your doctor or other care provider to review them with you.

## 2017-10-09 DIAGNOSIS — I10 ESSENTIAL HYPERTENSION: ICD-10-CM

## 2017-10-10 RX ORDER — LISINOPRIL 20 MG/1
TABLET ORAL
Qty: 90 TABLET | Refills: 2 | Status: SHIPPED | OUTPATIENT
Start: 2017-10-10 | End: 2018-08-30

## 2017-10-10 NOTE — TELEPHONE ENCOUNTER
LISINOPRIL 20MG TABLETS      Last Written Prescription Date: 04/13/17  Last Fill Quantity: 90, # refills: 1  Last Office Visit with G, P or [facility] prescribing provider: 09/21/17       Potassium   Date Value Ref Range Status   07/31/2017 3.9 3.4 - 5.3 mmol/L Final     Creatinine   Date Value Ref Range Status   07/31/2017 0.51 (L) 0.52 - 1.04 mg/dL Final     BP Readings from Last 3 Encounters:   09/21/17 110/60   07/31/17 102/78   02/13/17 124/70

## 2017-10-19 ENCOUNTER — ALLIED HEALTH/NURSE VISIT (OUTPATIENT)
Dept: NURSING | Facility: CLINIC | Age: 57
End: 2017-10-19
Payer: MEDICARE

## 2017-10-19 DIAGNOSIS — Z23 NEED FOR PROPHYLACTIC VACCINATION AND INOCULATION AGAINST INFLUENZA: Primary | ICD-10-CM

## 2017-10-19 DIAGNOSIS — F32.5 MAJOR DEPRESSION IN COMPLETE REMISSION (H): ICD-10-CM

## 2017-10-19 LAB
BASOPHILS # BLD AUTO: 0 10E9/L (ref 0–0.2)
BASOPHILS NFR BLD AUTO: 0.5 %
DIFFERENTIAL METHOD BLD: NORMAL
EOSINOPHIL # BLD AUTO: 0.1 10E9/L (ref 0–0.7)
EOSINOPHIL NFR BLD AUTO: 1.2 %
ERYTHROCYTE [DISTWIDTH] IN BLOOD BY AUTOMATED COUNT: 14.3 % (ref 10–15)
HCT VFR BLD AUTO: 43.6 % (ref 35–47)
HGB BLD-MCNC: 14.8 G/DL (ref 11.7–15.7)
LYMPHOCYTES # BLD AUTO: 1.4 10E9/L (ref 0.8–5.3)
LYMPHOCYTES NFR BLD AUTO: 24.4 %
MCH RBC QN AUTO: 31.4 PG (ref 26.5–33)
MCHC RBC AUTO-ENTMCNC: 33.9 G/DL (ref 31.5–36.5)
MCV RBC AUTO: 93 FL (ref 78–100)
MONOCYTES # BLD AUTO: 0.4 10E9/L (ref 0–1.3)
MONOCYTES NFR BLD AUTO: 7 %
NEUTROPHILS # BLD AUTO: 3.9 10E9/L (ref 1.6–8.3)
NEUTROPHILS NFR BLD AUTO: 66.9 %
PLATELET # BLD AUTO: 260 10E9/L (ref 150–450)
RBC # BLD AUTO: 4.71 10E12/L (ref 3.8–5.2)
WBC # BLD AUTO: 5.9 10E9/L (ref 4–11)

## 2017-10-19 PROCEDURE — 99207 ZZC NO CHARGE NURSE ONLY: CPT

## 2017-10-19 PROCEDURE — 85025 COMPLETE CBC W/AUTO DIFF WBC: CPT | Performed by: FAMILY MEDICINE

## 2017-10-19 PROCEDURE — G0008 ADMIN INFLUENZA VIRUS VAC: HCPCS

## 2017-10-19 PROCEDURE — 36415 COLL VENOUS BLD VENIPUNCTURE: CPT | Performed by: FAMILY MEDICINE

## 2017-10-19 PROCEDURE — 90686 IIV4 VACC NO PRSV 0.5 ML IM: CPT

## 2017-10-19 NOTE — MR AVS SNAPSHOT
After Visit Summary   10/19/2017    Joann Gonzalez    MRN: [id number]           Patient Information     Date Of Birth          1960        Visit Information        Provider Department      10/19/2017 1:30 PM BX NURSE [facility]        Today's Diagnoses     Need for prophylactic vaccination and inoculation against influenza    -  1       Follow-ups after your visit        Your next 10 appointments already scheduled     Oct 19, 2017  1:30 PM CDT   Nurse Only with BX NURSE   [facility] ([facility])    [address] [place] [id number] [phone]              Who to contact     If you have questions or need follow up information about today's clinic visit or your schedule please contact [facility] directly at [phone].  Normal or non-critical lab and imaging results will be communicated to you by MyChart, letter or phone within 4 business days after the clinic has received the results. If you do not hear from us within 7 days, please contact the clinic through MyChart or phone. If you have a critical or abnormal lab result, we will notify you by phone as soon as possible.  Submit refill requests through [employer] Week[employer] or call your pharmacy and they will forward the refill request to us. Please allow 3 business days for your refill to be completed.          Additional Information About Your Visit        Care EveryWhere ID     This is your Care EveryWhere ID. This could be used by other organizations to access your [place] medical records  [id number]         Blood Pressure from Last 3 Encounters:   09/21/17 110/60   07/31/17 102/78   02/13/17 124/70    Weight from Last 3 Encounters:   09/21/17 106 lb (48.1 kg)   07/31/17 102 lb 8 oz (46.5 kg)   02/13/17 106 lb (48.1 kg)              We Performed the Following     FLU VAC, SPLIT VIRUS IM > 3 YO  (QUADRIVALENT) [23765]     Vaccine Administration, Initial [89690]        Primary Care Provider Office Phone # Fax #    Esau Ospina [phone] [address] [facility] [address]        Equal Access to Services     JUAN CARLOS SCHMIDT : Hadii aad ku hadasho Soomaali, waaxda luqadaha, qaybta kaalmada adeegyada, waxay idiin hayaan adeharris khfritzedilia laale . So [facility] [phone].    ATENCIÓN: Si habla español, tiene a lugo disposición servicios gratuitos de asistencia lingüística. Husam al [phone].    We comply with applicable federal civil rights laws and [place] laws. We do not discriminate on the basis of race, color, national origin, age, disability, sex, sexual orientation, or gender identity.            Thank you!     Thank you for choosing [facility]  for your care. Our goal is always to provide you with excellent care. Hearing back from our patients is one way we can continue to improve our services. Please take a few minutes to complete the written survey that you may receive in the mail after your visit with us. Thank you!             Your Updated Medication List - Protect others around you: Learn how to safely use, store and throw away your medicines at www.disposemymeds.org.          This list is accurate as of: 10/19/17  1:18 PM.  Always use your most recent med list.                   Brand Name Dispense Instructions for use Diagnosis    amLODIPine 10 MG tablet    NORVASC    90 tablet    TAKE 1 TABLET BY MOUTH EVERY DAY    HTN (hypertension)       cloNIDine 0.1 MG tablet    CATAPRES    360 tablet    TAKE 1 TABLET BY MOUTH TWICE DAILY. MAY INCREASE TO 2 TABLET TWICE DAILY IF BLOOD PRESSURE IS NOT BELOW 140/90    Benign essential hypertension       clopidogrel 75 MG tablet    PLAVIX    90 tablet    TAKE 1 TABLET(75 MG) BY MOUTH DAILY    Transient cerebral ischemia, unspecified type       * FLUoxetine 20 MG capsule    PROzac    180 capsule    TAKE 2  CAPSULES BY MOUTH EVERY DAY    Major depressive disorder, single episode in full remission (H), Anxiety state       * FLUoxetine 20 MG capsule    PROzac    180 capsule    TAKE 2 CAPSULES BY MOUTH EVERY DAY    Major depressive disorder, single episode in full remission (H), Anxiety state       * lisinopril 20 MG tablet    PRINIVIL/ZESTRIL    90 tablet    TAKE 1 TABLET BY MOUTH EVERY DAY    Essential hypertension       * lisinopril 20 MG tablet    PRINIVIL/ZESTRIL    90 tablet    TAKE 1 TABLET BY MOUTH EVERY DAY    Essential hypertension       LYRICA PO      Take 50 mg by mouth 2 times daily        Multi-vitamin Tabs tablet   Generic drug:  multivitamin, therapeutic with minerals      Take 1 tablet by mouth daily.        omeprazole 20 MG tablet     30 tablet    Take 1 tablet (20 mg) by mouth daily Take 30-60 minutes before a meal.    Epigastric pain       oxyCODONE-acetaminophen 5-325 MG per tablet    PERCOCET    20 tablet    Take 1 tablet by mouth every 4 hours as needed for moderate to severe pain    Contusion of left knee, initial encounter       * QUEtiapine 100 MG tablet    SEROquel    90 tablet    TAKE 1 TABLET(100 MG) BY MOUTH AT BEDTIME    Major depression in complete remission (H)       * QUEtiapine 25 MG tablet    SEROquel    90 tablet    TAKE 1/2 TABLET BY MOUTH TWICE DAILY AS DIRECTED    Major depression in complete remission (H)       * QUEtiapine 100 MG tablet    SEROquel    30 tablet    TAKE 1 TABLET BY MOUTH EVERY NIGHT AT BEDTIME    Major depression in complete remission (H)       tiZANidine 4 MG tablet    ZANAFLEX    180 tablet    TAKE 1 TABLET(4 MG) BY MOUTH TWICE DAILY    Chronic bilateral low back pain without sciatica       traMADol 50 MG tablet    ULTRAM          vitamin D3 1000 UNITS Caps     30 capsule    Take 1 capsule by mouth daily.    Vitamin D deficiency       * Notice:  This list has 7 medication(s) that are the same as other medications prescribed for you. Read the directions carefully,  and ask your doctor or other care provider to review them with you.

## 2017-10-19 NOTE — PROGRESS NOTES

## 2017-11-04 DIAGNOSIS — F32.5 MAJOR DEPRESSION IN COMPLETE REMISSION (H): ICD-10-CM

## 2017-11-07 RX ORDER — QUETIAPINE FUMARATE 100 MG/1
TABLET, FILM COATED ORAL
Qty: 90 TABLET | Refills: 0 | Status: SHIPPED | OUTPATIENT
Start: 2017-11-07 | End: 2018-08-30

## 2018-01-05 DIAGNOSIS — I10 BENIGN ESSENTIAL HYPERTENSION: Primary | ICD-10-CM

## 2018-01-09 RX ORDER — AMLODIPINE BESYLATE 10 MG/1
TABLET ORAL
Qty: 90 TABLET | Refills: 1 | Status: SHIPPED | OUTPATIENT
Start: 2018-01-09 | End: 2018-07-01

## 2018-01-09 NOTE — TELEPHONE ENCOUNTER
"Requested Prescriptions   Pending Prescriptions Disp Refills     amLODIPine (NORVASC) 10 MG tablet [Pharmacy Med Name: AMLODIPINE BESYLATE 10MG TABLETS]  Last Written Prescription Date:  7/11/2017  Last Fill Quantity: 90 tablet,  # refills: 1   Last Office Visit  9/21/2017        with  [facility], P or [facility] prescribing provider:     Future Office Visit:    90 tablet 0     Sig: TAKE 1 TABLET BY MOUTH EVERY DAY    Calcium Channel Blockers Protocol  Failed    1/5/2018 12:43 PM       Failed - Normal serum creatinine on file in past 12 months    Recent Labs   Lab Test  07/31/17   1127   CR  0.51*          Passed - Blood pressure under 140/90    BP Readings from Last 3 Encounters:   09/21/17 110/60   07/31/17 102/78   02/13/17 124/70          Passed - Recent or future visit with authorizing provider    Patient had office visit in the last year or has a visit in the next 30 days with authorizing provider.  See \"Patient Info\" tab in inbasket, or \"Choose Columns\" in Meds & Orders section of the refill encounter.        Passed - Patient is age 18 or older       Passed - No active pregnancy on record       Passed - No positive pregnancy test in past 12 months          "

## 2018-01-26 ENCOUNTER — OFFICE VISIT (OUTPATIENT)
Dept: FAMILY MEDICINE | Facility: CLINIC | Age: 58
End: 2018-01-26
Payer: MEDICARE

## 2018-01-26 VITALS
TEMPERATURE: 100 F | SYSTOLIC BLOOD PRESSURE: 110 MMHG | OXYGEN SATURATION: 98 % | HEART RATE: 97 BPM | HEIGHT: 61 IN | WEIGHT: 118.5 LBS | RESPIRATION RATE: 16 BRPM | DIASTOLIC BLOOD PRESSURE: 70 MMHG | BODY MASS INDEX: 22.37 KG/M2

## 2018-01-26 DIAGNOSIS — F41.1 ANXIETY STATE: ICD-10-CM

## 2018-01-26 DIAGNOSIS — R07.0 THROAT PAIN: ICD-10-CM

## 2018-01-26 DIAGNOSIS — F32.5 MAJOR DEPRESSIVE DISORDER, SINGLE EPISODE IN FULL REMISSION (H): ICD-10-CM

## 2018-01-26 DIAGNOSIS — J20.9 ACUTE BRONCHITIS, UNSPECIFIED ORGANISM: Primary | ICD-10-CM

## 2018-01-26 LAB
DEPRECATED S PYO AG THROAT QL EIA: NORMAL
SPECIMEN SOURCE: NORMAL

## 2018-01-26 PROCEDURE — 87880 STREP A ASSAY W/OPTIC: CPT | Performed by: FAMILY MEDICINE

## 2018-01-26 PROCEDURE — 99213 OFFICE O/P EST LOW 20 MIN: CPT | Performed by: FAMILY MEDICINE

## 2018-01-26 PROCEDURE — 87081 CULTURE SCREEN ONLY: CPT | Performed by: FAMILY MEDICINE

## 2018-01-26 RX ORDER — AZITHROMYCIN 250 MG/1
TABLET, FILM COATED ORAL
Qty: 6 TABLET | Refills: 0 | Status: SHIPPED | OUTPATIENT
Start: 2018-01-26 | End: 2018-08-30

## 2018-01-26 NOTE — MR AVS SNAPSHOT
"              After Visit Summary   1/26/2018    Joann Gonzalez    MRN: [id number]           Patient Information     Date Of Birth          1960        Visit Information        Provider Department      1/26/2018 10:30 AM Esau Ospina MD [facility]        Today's Diagnoses     Acute bronchitis, unspecified organism    -  1    Major depressive disorder, single episode in full remission (H)        Anxiety state        Throat pain          Care Instructions    Symptomatic treatment.  Will use saline gargles, tylenol and/or advil. Suck on  lozenges as needed. Push fluids. Salt water nasal spray as needed.  Use expectorant such as Mucinex or Robitussin for cough          Follow-ups after your visit        Follow-up notes from your care team     Return if symptoms worsen or fail to improve.      Who to contact     If you have questions or need follow up information about today's clinic visit or your schedule please contact [facility] directly at [phone].  Normal or non-critical lab and imaging results will be communicated to you by LoopPayhart, letter or phone within 4 business days after the clinic has received the results. If you do not hear from us within 7 days, please contact the clinic through LoopPayhart or phone. If you have a critical or abnormal lab result, we will notify you by phone as soon as possible.  Submit refill requests through OX MEDIA or call your pharmacy and they will forward the refill request to us. Please allow 3 business days for your refill to be completed.          Additional Information About Your Visit        LoopPayhart Information     OX MEDIA lets you send messages to your doctor, view your test results, renew your prescriptions, schedule appointments and more. To sign up, go to [email or web address] . Click on \"Log in\" on the left side of the screen, which will take you to the Welcome page. Then click on \"Sign up Now\" on the " "right side of the page.     You will be asked to enter the access code listed below, as well as some personal information. Please follow the directions to create your username and password.     Your access code is: [email or web address]  Expires: 2018 10:46 AM     Your access code will  in 90 days. If you need help or a new code, please call your [facility] or [phone].        Care EveryWhere ID     This is your Care EveryWhere ID. This could be used by other organizations to access your [place] medical records  [id number]        Your Vitals Were     Pulse Temperature Respirations Height Pulse Oximetry BMI (Body Mass Index)    97 100  F (37.8  C) (Tympanic) 16 5' 1\" (1.549 m) 98% 22.39 kg/m2       Blood Pressure from Last 3 Encounters:   18 110/70   17 110/60   17 102/78    Weight from Last 3 Encounters:   18 118 lb 8 oz (53.8 kg)   17 106 lb (48.1 kg)   17 102 lb 8 oz (46.5 kg)              We Performed the Following     Beta strep group A culture     Strep, Rapid Screen          Today's Medication Changes          These changes are accurate as of 18 11:11 AM.  If you have any questions, ask your nurse or doctor.               Start taking these medicines.        Dose/Directions    azithromycin 250 MG tablet   Commonly known as:  ZITHROMAX   Used for:  Acute bronchitis, unspecified organism   Started by:  Esau Ospina MD        Two tablets first day, then one tablet daily for four days.   Quantity:  6 tablet   Refills:  0            Where to get your medicines      [facility] [address] [place] [address] [address] AT [facility] [address] [facility] [id number]     Phone:  [phone]     azithromycin 250 MG tablet    FLUoxetine 20 MG capsule                Primary Care Provider Office Phone # Fax #    Esau Ospina [phone] [address] [facility] [address] [place] " [place] [address]        Equal Access to Services     [facility] : Hadii jose e becerril xena Saucedo, waaxda luqadaha, qaybta kaalmada henry, leigh ann hernandezfritzedilia novak. So [facility] [phone].    ATENCIÓN: Si habla español, tiene a lugo disposición servicios gratuitos de asistencia lingüística. Husam al [phone].    We comply with applicable federal civil rights laws and [place] laws. We do not discriminate on the basis of race, color, national origin, age, disability, sex, sexual orientation, or gender identity.            Thank you!     Thank you for choosing [facility]  for your care. Our goal is always to provide you with excellent care. Hearing back from our patients is one way we can continue to improve our services. Please take a few minutes to complete the written survey that you may receive in the mail after your visit with us. Thank you!             Your Updated Medication List - Protect others around you: Learn how to safely use, store and throw away your medicines at www.disposemymeds.org.          This list is accurate as of 1/26/18 11:11 AM.  Always use your most recent med list.                   Brand Name Dispense Instructions for use Diagnosis    amLODIPine 10 MG tablet    NORVASC    90 tablet    TAKE 1 TABLET BY MOUTH EVERY DAY    Benign essential hypertension       azithromycin 250 MG tablet    ZITHROMAX    6 tablet    Two tablets first day, then one tablet daily for four days.    Acute bronchitis, unspecified organism       cloNIDine 0.1 MG tablet    CATAPRES    360 tablet    TAKE 1 TABLET BY MOUTH TWICE DAILY. MAY INCREASE TO 2 TABLET TWICE DAILY IF BLOOD PRESSURE IS NOT BELOW 140/90    Benign essential hypertension       clopidogrel 75 MG tablet    PLAVIX    90 tablet    TAKE 1 TABLET(75 MG) BY MOUTH DAILY    Transient cerebral ischemia, unspecified type       FLUoxetine 20 MG capsule    PROzac    180 capsule    TAKE 2 CAPSULES BY MOUTH EVERY DAY     Major depressive disorder, single episode in full remission (H), Anxiety state       lisinopril 20 MG tablet    PRINIVIL/ZESTRIL    90 tablet    TAKE 1 TABLET BY MOUTH EVERY DAY    Essential hypertension       LYRICA PO      Take 50 mg by mouth 2 times daily        Multi-vitamin Tabs tablet   Generic drug:  multivitamin, therapeutic with minerals      Take 1 tablet by mouth daily.        omeprazole 20 MG tablet     30 tablet    Take 1 tablet (20 mg) by mouth daily Take 30-60 minutes before a meal.    Epigastric pain       oxyCODONE-acetaminophen 5-325 MG per tablet    PERCOCET    20 tablet    Take 1 tablet by mouth every 4 hours as needed for moderate to severe pain    Contusion of left knee, initial encounter       QUEtiapine 100 MG tablet    SEROquel    90 tablet    TAKE 1 TABLET BY MOUTH EVERY NIGHT AT BEDTIME    Major depression in complete remission (H)       tiZANidine 4 MG tablet    ZANAFLEX    180 tablet    TAKE 1 TABLET(4 MG) BY MOUTH TWICE DAILY    Chronic bilateral low back pain without sciatica       vitamin D3 1000 UNITS Caps     30 capsule    Take 1 capsule by mouth daily.    Vitamin D deficiency

## 2018-01-26 NOTE — NURSING NOTE
"Chief Complaint   Patient presents with     URI     cough       Initial /70 (BP Location: Left arm, Patient Position: Sitting, Cuff Size: Adult Regular)  Pulse 97  Temp 100  F (37.8  C) (Tympanic)  Resp 16  Ht 5' 1\" (1.549 m)  Wt 118 lb 8 oz (53.8 kg)  SpO2 98%  BMI 22.39 kg/m2 Estimated body mass index is 22.39 kg/(m^2) as calculated from the following:    Height as of this encounter: 5' 1\" (1.549 m).    Weight as of this encounter: 118 lb 8 oz (53.8 kg).  Medication Reconciliation: complete     Princess PASTOR Solis CMA      "

## 2018-01-26 NOTE — PROGRESS NOTES
SUBJECTIVE:   Joann Gonzalez is a 57 year old female who presents to clinic today for the following health issues:      RESPIRATORY SYMPTOMS      Duration: 2-3 days    Description  nasal congestion, sore throat and cough (brown phlegm)    Severity: moderate    Accompanying signs and symptoms: see below    History (predisposing factors):  Recently quit smoking Jan 1st    Precipitating or alleviating factors: Cough worse at night    Therapies tried and outcome:  Cough drops    Pt quit smoking as of end of November 2017    ENT Symptoms             Symptoms: cc Present Absent Comment   Fever/Chills   X    Fatigue  X     Muscle Aches   X    Eye Irritation   X    Sneezing   X    Nasal Carlos/Drg  X     Sinus Pressure/Pain  X     Loss of smell   X    Dental pain   X    Sore Throat  X     Swollen Glands  X     Ear Pain/Fullness   X    Cough  X     Wheeze   X    Chest Pain   X    Shortness of breath  X     Rash   X    Other         Symptom duration:  2-3 days   Symptom severity:  moderate   Treatments tried:  cough drops   Contacts:  None         Depression and Anxiety Follow-Up    Status since last visit: Worsened-recently quit smoking Jan 1st     Other associated symptoms:irritible    Complicating factors:     Significant life event: Quit smoking     Current substance abuse: None    PHQ-9 5/10/2017 7/31/2017 10/17/2017   Total Score 8 8 6   Q9: Suicide Ideation Not at all Not at all Not at all     PAU-7 SCORE 2/25/2016 4/21/2016 7/31/2017   Total Score - - -   Total Score 4 5 5   Total Score - - -     In the past two weeks have you had thoughts of suicide or self-harm?  No.    Do you have concerns about your personal safety or the safety of others?   No  PHQ-9  English  PHQ-9   Any Language  PAU-7  Suicide Assessment Five-step Evaluation and Treatment (SAFE-T)    Problem list and histories reviewed & adjusted, as indicated.  Additional history: as documented    Labs reviewed in EPIC    Reviewed and updated as needed this  "visit by clinical staff       Reviewed and updated as needed this visit by Provider         ROS:  CONSTITUTIONAL:NEGATIVE for fever, chills, change in weight  ENT/MOUTH: POSITIVE for nasal congestion, postnasal drainage and sore throat  RESP:POSITIVE for cough-productive and wheezing  PSYCHIATRIC: POSITIVE forHx depression    OBJECTIVE:                                                    /70 (BP Location: Left arm, Patient Position: Sitting, Cuff Size: Adult Regular)  Pulse 97  Temp 100  F (37.8  C) (Tympanic)  Resp 16  Ht 5' 1\" (1.549 m)  Wt 118 lb 8 oz (53.8 kg)  SpO2 98%  BMI 22.39 kg/m2  Body mass index is 22.39 kg/(m^2).  GENERAL APPEARANCE: healthy, alert and no distress  HENT: ear canals and TM's normal, nose and mouth without ulcers or lesions, nasal mucosa edematous without rhinorrhea, oral mucous membranes moist and oropharynx clear  NECK: no adenopathy, no asymmetry, masses, or scars and thyroid normal to palpation  RESP: rhonchi R mid posterior and L mid posterior  PSYCH: mentation appears normal and affect flat    Diagnostic test results:  Strep screen - Negative     ASSESSMENT/PLAN:                                                        ICD-10-CM    1. Acute bronchitis, unspecified organism J20.9 azithromycin (ZITHROMAX) 250 MG tablet     Beta strep group A culture   2. Major depressive disorder, single episode in full remission (H) F32.5 FLUoxetine (PROZAC) 20 MG capsule   3. Anxiety state F41.1 FLUoxetine (PROZAC) 20 MG capsule   4. Throat pain R07.0 Strep, Rapid Screen       Follow up with Provider - as needed    Patient Instructions   Symptomatic treatment.  Will use saline gargles, tylenol and/or advil. Suck on  lozenges as needed. Push fluids. Salt water nasal spray as needed.  Use expectorant such as Mucinex or Robitussin for cough      Esau Ospina MD  [facility]      .soapp    "

## 2018-01-27 LAB
BACTERIA SPEC CULT: NORMAL
SPECIMEN SOURCE: NORMAL

## 2018-06-30 DIAGNOSIS — I10 ESSENTIAL HYPERTENSION: ICD-10-CM

## 2018-07-02 NOTE — TELEPHONE ENCOUNTER
"Requested Prescriptions   Pending Prescriptions Disp Refills     lisinopril (PRINIVIL/ZESTRIL) 20 MG tablet [Pharmacy Med Name: LISINOPRIL 20MG TABLETS] 90 tablet 0     Sig: TAKE 1 TABLET BY MOUTH EVERY DAY    ACE Inhibitors (Including Combos) Protocol Failed    6/30/2018  3:10 PM       Failed - Normal serum creatinine on file in past 12 months    Recent Labs   Lab Test  07/31/17   1127   CR  0.51*            Passed - Blood pressure under 140/90 in past 12 months    BP Readings from Last 3 Encounters:   01/26/18 110/70   09/21/17 110/60   07/31/17 102/78                Passed - Recent (12 mo) or future (30 days) visit within the authorizing provider's specialty    Patient had office visit in the last 12 months or has a visit in the next 30 days with authorizing provider or within the authorizing provider's specialty.  See \"Patient Info\" tab in inbasket, or \"Choose Columns\" in Meds & Orders section of the refill encounter.           Passed - Patient is age 18 or older       Passed - No active pregnancy on record       Passed - Normal serum potassium on file in past 12 months    Recent Labs   Lab Test  07/31/17   1127   POTASSIUM  3.9            Passed - No positive pregnancy test in past 12 months      Lisinopril 20mg      Last Written Prescription Date:  10/10/2017  Last Fill Quantity: 90,   # refills: 2  Last Office Visit: 1/26/2018  Future Office visit: none        "

## 2018-07-03 RX ORDER — LISINOPRIL 20 MG/1
TABLET ORAL
Qty: 90 TABLET | Refills: 0 | Status: SHIPPED | OUTPATIENT
Start: 2018-07-03 | End: 2018-08-30

## 2018-08-30 ENCOUNTER — OFFICE VISIT (OUTPATIENT)
Dept: FAMILY MEDICINE | Facility: CLINIC | Age: 58
End: 2018-08-30
Payer: MEDICARE

## 2018-08-30 VITALS
WEIGHT: 124 LBS | DIASTOLIC BLOOD PRESSURE: 76 MMHG | SYSTOLIC BLOOD PRESSURE: 112 MMHG | OXYGEN SATURATION: 97 % | TEMPERATURE: 97.3 F | BODY MASS INDEX: 23.41 KG/M2 | HEART RATE: 80 BPM | HEIGHT: 61 IN

## 2018-08-30 DIAGNOSIS — F41.1 ANXIETY STATE: ICD-10-CM

## 2018-08-30 DIAGNOSIS — M54.50 CHRONIC MIDLINE LOW BACK PAIN WITHOUT SCIATICA: ICD-10-CM

## 2018-08-30 DIAGNOSIS — I10 ESSENTIAL HYPERTENSION: ICD-10-CM

## 2018-08-30 DIAGNOSIS — I10 BENIGN ESSENTIAL HYPERTENSION: ICD-10-CM

## 2018-08-30 DIAGNOSIS — Z00.01 ENCOUNTER FOR ROUTINE ADULT HEALTH EXAMINATION WITH ABNORMAL FINDINGS: Primary | ICD-10-CM

## 2018-08-30 DIAGNOSIS — M96.1 POSTLAMINECTOMY SYNDROME, LUMBAR REGION: ICD-10-CM

## 2018-08-30 DIAGNOSIS — R26.89 BALANCE PROBLEMS: ICD-10-CM

## 2018-08-30 DIAGNOSIS — K43.9 VENTRAL HERNIA WITHOUT OBSTRUCTION OR GANGRENE: ICD-10-CM

## 2018-08-30 DIAGNOSIS — G89.29 CHRONIC MIDLINE LOW BACK PAIN WITHOUT SCIATICA: ICD-10-CM

## 2018-08-30 DIAGNOSIS — F32.5 MAJOR DEPRESSIVE DISORDER, SINGLE EPISODE IN FULL REMISSION (H): ICD-10-CM

## 2018-08-30 DIAGNOSIS — Z23 ENCOUNTER FOR IMMUNIZATION: ICD-10-CM

## 2018-08-30 LAB
ALBUMIN SERPL-MCNC: 3.5 G/DL (ref 3.4–5)
ALBUMIN UR-MCNC: NEGATIVE MG/DL
ALP SERPL-CCNC: 133 U/L (ref 40–150)
ALT SERPL W P-5'-P-CCNC: 15 U/L (ref 0–50)
ANION GAP SERPL CALCULATED.3IONS-SCNC: 9 MMOL/L (ref 3–14)
APPEARANCE UR: CLEAR
AST SERPL W P-5'-P-CCNC: 19 U/L (ref 0–45)
BILIRUB SERPL-MCNC: 0.3 MG/DL (ref 0.2–1.3)
BILIRUB UR QL STRIP: NEGATIVE
BUN SERPL-MCNC: 6 MG/DL (ref 7–30)
CALCIUM SERPL-MCNC: 8.7 MG/DL (ref 8.5–10.1)
CHLORIDE SERPL-SCNC: 105 MMOL/L (ref 94–109)
CHOLEST SERPL-MCNC: 186 MG/DL
CO2 SERPL-SCNC: 27 MMOL/L (ref 20–32)
COLOR UR AUTO: YELLOW
CREAT SERPL-MCNC: 0.59 MG/DL (ref 0.52–1.04)
ERYTHROCYTE [DISTWIDTH] IN BLOOD BY AUTOMATED COUNT: 14 % (ref 10–15)
GFR SERPL CREATININE-BSD FRML MDRD: >90 ML/MIN/1.7M2
GLUCOSE SERPL-MCNC: 76 MG/DL (ref 70–99)
GLUCOSE UR STRIP-MCNC: NEGATIVE MG/DL
HCT VFR BLD AUTO: 41.9 % (ref 35–47)
HDLC SERPL-MCNC: 61 MG/DL
HGB BLD-MCNC: 14 G/DL (ref 11.7–15.7)
HGB UR QL STRIP: ABNORMAL
KETONES UR STRIP-MCNC: NEGATIVE MG/DL
LDLC SERPL CALC-MCNC: 103 MG/DL
LEUKOCYTE ESTERASE UR QL STRIP: ABNORMAL
MCH RBC QN AUTO: 30.4 PG (ref 26.5–33)
MCHC RBC AUTO-ENTMCNC: 33.4 G/DL (ref 31.5–36.5)
MCV RBC AUTO: 91 FL (ref 78–100)
NITRATE UR QL: NEGATIVE
NON-SQ EPI CELLS #/AREA URNS LPF: ABNORMAL /LPF
NONHDLC SERPL-MCNC: 125 MG/DL
PH UR STRIP: 5.5 PH (ref 5–7)
PLATELET # BLD AUTO: 213 10E9/L (ref 150–450)
POTASSIUM SERPL-SCNC: 3.5 MMOL/L (ref 3.4–5.3)
PROT SERPL-MCNC: 7.1 G/DL (ref 6.8–8.8)
RBC # BLD AUTO: 4.6 10E12/L (ref 3.8–5.2)
RBC #/AREA URNS AUTO: ABNORMAL /HPF
SODIUM SERPL-SCNC: 141 MMOL/L (ref 133–144)
SOURCE: ABNORMAL
SP GR UR STRIP: 1.01 (ref 1–1.03)
TRIGL SERPL-MCNC: 111 MG/DL
TSH SERPL DL<=0.005 MIU/L-ACNC: 1.27 MU/L (ref 0.4–4)
UROBILINOGEN UR STRIP-ACNC: 0.2 EU/DL (ref 0.2–1)
WBC # BLD AUTO: 4.5 10E9/L (ref 4–11)
WBC #/AREA URNS AUTO: ABNORMAL /HPF

## 2018-08-30 PROCEDURE — 80307 DRUG TEST PRSMV CHEM ANLYZR: CPT | Mod: 90 | Performed by: FAMILY MEDICINE

## 2018-08-30 PROCEDURE — 90686 IIV4 VACC NO PRSV 0.5 ML IM: CPT | Performed by: FAMILY MEDICINE

## 2018-08-30 PROCEDURE — 84443 ASSAY THYROID STIM HORMONE: CPT | Performed by: FAMILY MEDICINE

## 2018-08-30 PROCEDURE — 85027 COMPLETE CBC AUTOMATED: CPT | Performed by: FAMILY MEDICINE

## 2018-08-30 PROCEDURE — 99396 PREV VISIT EST AGE 40-64: CPT | Mod: 25 | Performed by: FAMILY MEDICINE

## 2018-08-30 PROCEDURE — 80061 LIPID PANEL: CPT | Performed by: FAMILY MEDICINE

## 2018-08-30 PROCEDURE — 81001 URINALYSIS AUTO W/SCOPE: CPT | Mod: 59 | Performed by: FAMILY MEDICINE

## 2018-08-30 PROCEDURE — 99000 SPECIMEN HANDLING OFFICE-LAB: CPT | Performed by: FAMILY MEDICINE

## 2018-08-30 PROCEDURE — 80053 COMPREHEN METABOLIC PANEL: CPT | Performed by: FAMILY MEDICINE

## 2018-08-30 PROCEDURE — 36415 COLL VENOUS BLD VENIPUNCTURE: CPT | Performed by: FAMILY MEDICINE

## 2018-08-30 PROCEDURE — G0008 ADMIN INFLUENZA VIRUS VAC: HCPCS | Performed by: FAMILY MEDICINE

## 2018-08-30 RX ORDER — LISINOPRIL 20 MG/1
20 TABLET ORAL DAILY
Qty: 90 TABLET | Refills: 3 | Status: SHIPPED | OUTPATIENT
Start: 2018-08-30 | End: 2019-08-25

## 2018-08-30 RX ORDER — AMLODIPINE BESYLATE 10 MG/1
10 TABLET ORAL DAILY
Qty: 30 TABLET | Refills: 3 | Status: SHIPPED | OUTPATIENT
Start: 2018-08-30 | End: 2019-01-21

## 2018-08-30 ASSESSMENT — ANXIETY QUESTIONNAIRES
5. BEING SO RESTLESS THAT IT IS HARD TO SIT STILL: NOT AT ALL
7. FEELING AFRAID AS IF SOMETHING AWFUL MIGHT HAPPEN: NOT AT ALL
6. BECOMING EASILY ANNOYED OR IRRITABLE: MORE THAN HALF THE DAYS
2. NOT BEING ABLE TO STOP OR CONTROL WORRYING: SEVERAL DAYS
GAD7 TOTAL SCORE: 5
1. FEELING NERVOUS, ANXIOUS, OR ON EDGE: SEVERAL DAYS
IF YOU CHECKED OFF ANY PROBLEMS ON THIS QUESTIONNAIRE, HOW DIFFICULT HAVE THESE PROBLEMS MADE IT FOR YOU TO DO YOUR WORK, TAKE CARE OF THINGS AT HOME, OR GET ALONG WITH OTHER PEOPLE: SOMEWHAT DIFFICULT
3. WORRYING TOO MUCH ABOUT DIFFERENT THINGS: NOT AT ALL

## 2018-08-30 ASSESSMENT — PATIENT HEALTH QUESTIONNAIRE - PHQ9: 5. POOR APPETITE OR OVEREATING: SEVERAL DAYS

## 2018-08-30 NOTE — NURSING NOTE
"/76 (BP Location: Left arm, Patient Position: Sitting, Cuff Size: Adult Regular)  Pulse 80  Temp 97.3  F (36.3  C) (Tympanic)  Ht 5' 1\" (1.549 m)  Wt 124 lb (56.2 kg)  SpO2 97%  BMI 23.43 kg/m2    "

## 2018-08-30 NOTE — MR AVS SNAPSHOT
After Visit Summary   8/30/2018    Joann Gonzalez    MRN: [id number]           Patient Information     Date Of Birth          1960        Visit Information        Provider Department      8/30/2018 9:00 AM Esau Ospina MD [facility]        Today's Diagnoses     Chronic midline low back pain without sciatica    -  1    Benign essential hypertension        Major depressive disorder, single episode in full remission (H)        Anxiety state        Essential hypertension        Postlaminectomy syndrome, lumbar region        Encounter for immunization        Ventral hernia without obstruction or gangrene        Balance problems        Encounter for routine adult health examination with abnormal findings          Care Instructions      Preventive Health Recommendations  Female Ages 50 - 64    Yearly exam: See your health care provider every year in order to  o Review health changes.   o Discuss preventive care.    o Review your medicines if your doctor has prescribed any.      Get a Pap test every three years (unless you have an abnormal result and your provider advises testing more often).    If you get Pap tests with HPV test, you only need to test every 5 years, unless you have an abnormal result.     You do not need a Pap test if your uterus was removed (hysterectomy) and you have not had cancer.    You should be tested each year for STDs (sexually transmitted diseases) if you're at risk.     Have a mammogram every 1 to 2 years.    Have a colonoscopy at age 50, or have a yearly FIT test (stool test). These exams screen for colon cancer.      Have a cholesterol test every 5 years, or more often if advised.    Have a diabetes test (fasting glucose) every three years. If you are at risk for diabetes, you should have this test more often.     If you are at risk for osteoporosis (brittle bone disease), think about having a bone density scan (DEXA).    Shots: Get a flu  shot each year. Get a tetanus shot every 10 years.    Nutrition:     Eat at least 5 servings of fruits and vegetables each day.    Eat whole-grain bread, whole-wheat pasta and brown rice instead of white grains and rice.    Get adequate Calcium and Vitamin D.     Lifestyle    Exercise at least 150 minutes a week (30 minutes a day, 5 days a week). This will help you control your weight and prevent disease.    Limit alcohol to one drink per day.    No smoking.     Wear sunscreen to prevent skin cancer.     See your dentist every six months for an exam and cleaning.    See your eye doctor every 1 to 2 years.            Follow-ups after your visit        Additional Services     GENERAL SURG ADULT REFERRAL       Your provider has referred you to: [place]: [facility] Marilou Swann [phone]   [email or web address]    Please be aware that coverage of these services is subject to the terms and limitations of your health insurance plan.  Call member services at your health plan with any benefit or coverage questions.      Please bring the following with you to your appointment:    (1) Any X-Rays, CTs or MRIs which have been performed.  Contact the facility where they were done to arrange for  prior to your scheduled appointment.   (2) List of current medications   (3) This referral request   (4) Any documents/labs given to you for this referral                  Who to contact     If you have questions or need follow up information about today's clinic visit or your schedule please contact [facility] directly at [phone].  Normal or non-critical lab and imaging results will be communicated to you by MyChart, letter or phone within 4 business days after the clinic has received the results. If you do not hear from us within 7 days, please contact the clinic through MyChart or phone. If you have a critical or abnormal lab result, we will notify  "you by phone as soon as possible.  Submit refill requests through [employer] or call your pharmacy and they will forward the refill request to us. Please allow 3 business days for your refill to be completed.          Additional Information About Your Visit        Care EveryWhere ID     This is your Care EveryWhere ID. This could be used by other organizations to access your [facility] medical records  [id number]        Your Vitals Were     Pulse Temperature Height Pulse Oximetry BMI (Body Mass Index)       80 97.3  F (36.3  C) (Tympanic) 5' 1\" (1.549 m) 97% 23.43 kg/m2        Blood Pressure from Last 3 Encounters:   08/30/18 112/76   01/26/18 110/70   09/21/17 110/60    Weight from Last 3 Encounters:   08/30/18 124 lb (56.2 kg)   01/26/18 118 lb 8 oz (53.8 kg)   09/21/17 106 lb (48.1 kg)              We Performed the Following     *UA reflex to Microscopic     CBC with platelets     Comprehensive metabolic panel     DEPRESSION ACTION PLAN (DAP)     Drug  Screen Comprehensive , Urine with Reported Meds (MedTox) (Pain Care Package)     GENERAL SURG ADULT REFERRAL     HC FLU VAC PRESRV FREE QUAD SPLIT VIR 3+YRS IM     Lipid panel reflex to direct LDL Fasting     TSH with free T4 reflex     VACCINE ADMINISTRATION, INITIAL          Today's Medication Changes          These changes are accurate as of 8/30/18  9:33 AM.  If you have any questions, ask your nurse or doctor.               These medicines have changed or have updated prescriptions.        Dose/Directions    amLODIPine 10 MG tablet   Commonly known as:  NORVASC   This may have changed:  See the new instructions.   Used for:  Benign essential hypertension   Changed by:  Esau Ospina MD        Dose:  10 mg   Take 1 tablet (10 mg) by mouth daily   Quantity:  30 tablet   Refills:  3       * lisinopril 20 MG tablet   Commonly known as:  PRINIVIL/ZESTRIL   This may have changed:  Another medication with the same name was changed. Make sure you understand how and " when to take each.   Used for:  Essential hypertension   Changed by:  Esau Ospina MD        TAKE 1 TABLET BY MOUTH EVERY DAY   Quantity:  90 tablet   Refills:  2       * lisinopril 20 MG tablet   Commonly known as:  PRINIVIL/ZESTRIL   This may have changed:  Another medication with the same name was changed. Make sure you understand how and when to take each.   Used for:  Essential hypertension   Changed by:  Esau Ospina MD        TAKE 1 TABLET BY MOUTH EVERY DAY   Quantity:  90 tablet   Refills:  0       * lisinopril 20 MG tablet   Commonly known as:  PRINIVIL/ZESTRIL   This may have changed:  See the new instructions.   Used for:  Essential hypertension   Changed by:  Esau Ospina MD        Dose:  20 mg   Take 1 tablet (20 mg) by mouth daily   Quantity:  90 tablet   Refills:  3       * Notice:  This list has 3 medication(s) that are the same as other medications prescribed for you. Read the directions carefully, and ask your doctor or other care provider to review them with you.         Where to get your medicines      These medications were sent to [facility] [id number] [place] [address] AT [facility] [address] [facility] [address]     Phone:  [phone]     amLODIPine 10 MG tablet    FLUoxetine 20 MG capsule    lisinopril 20 MG tablet                Primary Care Provider Office Phone # Fax #    Esau Ospina [phone] [address] [facility] [address]        Equal Access to Services     BETH SCHMIDT : Hadii aad ku hadasho Soomaali, waaxda luqadaha, qaybta kaalmada adeegyada, waxay bobbyin haybrenda novak. So [facility] [phone].    ATENCIÓN: Si habla español, tiene a lugo disposición servicios gratuitos de asistencia lingüística. Llame al [phone].    We comply with applicable federal civil rights laws and [place] laws. We do not discriminate on the basis of race, color, national origin, age,  disability, sex, sexual orientation, or gender identity.            Thank you!     Thank you for choosing [facility]  for your care. Our goal is always to provide you with excellent care. Hearing back from our patients is one way we can continue to improve our services. Please take a few minutes to complete the written survey that you may receive in the mail after your visit with us. Thank you!             Your Updated Medication List - Protect others around you: Learn how to safely use, store and throw away your medicines at www.disposemymeds.org.          This list is accurate as of 8/30/18  9:33 AM.  Always use your most recent med list.                   Brand Name Dispense Instructions for use Diagnosis    amLODIPine 10 MG tablet    NORVASC    30 tablet    Take 1 tablet (10 mg) by mouth daily    Benign essential hypertension       cloNIDine 0.1 MG tablet    CATAPRES    360 tablet    TAKE 1 TABLET BY MOUTH TWICE DAILY. MAY INCREASE TO 2 TABLET TWICE DAILY IF BLOOD PRESSURE IS NOT BELOW 140/90    Benign essential hypertension       clopidogrel 75 MG tablet    PLAVIX    90 tablet    TAKE 1 TABLET(75 MG) BY MOUTH DAILY    Transient cerebral ischemia, unspecified type       FLUoxetine 20 MG capsule    PROzac    180 capsule    TAKE 2 CAPSULES BY MOUTH EVERY DAY    Major depressive disorder, single episode in full remission (H), Anxiety state       * lisinopril 20 MG tablet    PRINIVIL/ZESTRIL    90 tablet    TAKE 1 TABLET BY MOUTH EVERY DAY    Essential hypertension       * lisinopril 20 MG tablet    PRINIVIL/ZESTRIL    90 tablet    TAKE 1 TABLET BY MOUTH EVERY DAY    Essential hypertension       * lisinopril 20 MG tablet    PRINIVIL/ZESTRIL    90 tablet    Take 1 tablet (20 mg) by mouth daily    Essential hypertension       LYRICA PO      Take 50 mg by mouth 2 times daily        Multi-vitamin Tabs tablet   Generic drug:  multivitamin, therapeutic with minerals      Take 1 tablet by  mouth daily.        oxyCODONE-acetaminophen 5-325 MG per tablet    PERCOCET    20 tablet    Take 1 tablet by mouth every 4 hours as needed for moderate to severe pain    Contusion of left knee, initial encounter       * QUEtiapine 100 MG tablet    SEROquel    90 tablet    TAKE 1 TABLET BY MOUTH EVERY NIGHT AT BEDTIME    Major depression in complete remission (H)       * QUEtiapine 100 MG tablet    SEROquel    90 tablet    TAKE 1 TABLET(100 MG) BY MOUTH AT BEDTIME    Major depression in complete remission (H)       * QUEtiapine 25 MG tablet    SEROquel    90 tablet    TAKE 1/2 TABLET BY MOUTH TWICE DAILY AS DIRECTED    Major depression in complete remission (H)       tiZANidine 4 MG tablet    ZANAFLEX    180 tablet    TAKE 1 TABLET(4 MG) BY MOUTH TWICE DAILY    Chronic bilateral low back pain without sciatica       vitamin D3 1000 units Caps     30 capsule    Take 1 capsule by mouth daily.    Vitamin D deficiency       * Notice:  This list has 6 medication(s) that are the same as other medications prescribed for you. Read the directions carefully, and ask your doctor or other care provider to review them with you.

## 2018-08-30 NOTE — LETTER
September 5, 2018      Joann Gonzalez  [address]   [facility] [place] [facility] [address]        Dear ,    We are writing to inform you of your test results.    Urine drug screen shows Morphine and Fentanyl present, from pain clinic  Metabolic panel is essentially normal, slightly low Urea Nitrogen of no significance  Thyroid test (TSH) is normal  Cholesterol panel shows LDL just over 100  CBC is normal  Urine essentially normal, nothing significant      Resulted Orders   Comprehensive metabolic panel   Result Value Ref Range    Sodium 141 133 - 144 mmol/L    Potassium 3.5 3.4 - 5.3 mmol/L    Chloride 105 94 - 109 mmol/L    Carbon Dioxide 27 20 - 32 mmol/L    Anion Gap 9 3 - 14 mmol/L    Glucose 76 70 - 99 mg/dL      Comment:      Fasting specimen    Urea Nitrogen 6 (L) 7 - 30 mg/dL    Creatinine 0.59 0.52 - 1.04 mg/dL    GFR Estimate >90 >60 mL/min/1.7m2      Comment:      Non  GFR Calc    GFR Estimate If Black >90 >60 mL/min/1.7m2      Comment:       GFR Calc    Calcium 8.7 8.5 - 10.1 mg/dL    Bilirubin Total 0.3 0.2 - 1.3 mg/dL    Albumin 3.5 3.4 - 5.0 g/dL    Protein Total 7.1 6.8 - 8.8 g/dL    Alkaline Phosphatase 133 40 - 150 U/L    ALT 15 0 - 50 U/L    AST 19 0 - 45 U/L   Lipid panel reflex to direct LDL Fasting   Result Value Ref Range    Cholesterol 186 <200 mg/dL    Triglycerides 111 <150 mg/dL      Comment:      Fasting specimen    HDL Cholesterol 61 >49 mg/dL    LDL Cholesterol Calculated 103 (H) <100 mg/dL      Comment:      Above desirable:  100-129 mg/dl  Borderline High:  130-159 mg/dL  High:             160-189 mg/dL  Very high:       >189 mg/dl      Non HDL Cholesterol 125 <130 mg/dL   *UA reflex to Microscopic   Result Value Ref Range    Color Urine Yellow     Appearance Urine Clear     Glucose Urine Negative NEG^Negative mg/dL    Bilirubin Urine Negative NEG^Negative    Ketones Urine Negative NEG^Negative mg/dL    Specific Gravity Urine 1.010 1.003 - 1.035     Blood Urine Trace (A) NEG^Negative    pH Urine 5.5 5.0 - 7.0 pH    Protein Albumin Urine Negative NEG^Negative mg/dL    Urobilinogen Urine 0.2 0.2 - 1.0 EU/dL    Nitrite Urine Negative NEG^Negative    Leukocyte Esterase Urine Trace (A) NEG^Negative    Source Midstream Urine    TSH with free T4 reflex   Result Value Ref Range    TSH 1.27 0.40 - 4.00 mU/L   Drug  Screen Comprehensive , Urine with Reported Meds (MedTox) (Pain Care Package)   Result Value Ref Range    Pain Drug SCR UR W RPTD Meds FINAL       Comment:      (Note)  ====================================================================  TOXASSURE COMP DRUG ANALYSIS,UR  ====================================================================  Test                             Result       Flag       Units        Drug Present   Morphine                       765                     ng/mg creat    Potential sources of morphine include administration of codeine    or morphine, use of heroin, or ingestion of poppy seeds.   Fentanyl                       86                      ng/mg creat   Norfentanyl                    324                     ng/mg creat    Source of fentanyl is a scheduled prescription medication,    including IV, patch, and transmucosal formulations. Norfentanyl    is an expected metabolite of fentanyl.   Fluoxetine                     PRESENT                               Norfluoxetine                  PRESENT                                Norfluoxetine is an expected metabolite of fluoxetine.   Quetiapine                     NE  ESENT                               Acetaminophen                  PRESENT                              ====================================================================  Test                      Result    Flag   Units      Ref Range        Creatinine              49               mg/dL      >=20            ====================================================================  Declared Medications:  Medication list  was not provided.  ====================================================================  For clinical consultation, please call [phone].  ====================================================================  Analysis performed by [employer] [place] [address]     CBC with platelets   Result Value Ref Range    WBC 4.5 4.0 - 11.0 10e9/L    RBC Count 4.60 3.8 - 5.2 10e12/L    Hemoglobin 14.0 11.7 - 15.7 g/dL    Hematocrit 41.9 35.0 - 47.0 %    MCV 91 78 - 100 fl    MCH 30.4 26.5 - 33.0 pg    MCHC 33.4 31.5 - 36.5 g/dL    RDW 14.0 10.0 - 15.0 %    Platelet Count 213 150 - 450 10e9/L   Urine Microscopic   Result Value Ref Range    WBC Urine 5-10 (A) OTO5^0 - 5 /HPF    RBC Urine O - 2 OTO2^O - 2 /HPF    Squamous Epithelial /LPF Urine Moderate (A) FEW^Few /LPF       If you have any questions or concerns, please call the clinic at the number listed above.       Sincerely,        Esau Ospina MD

## 2018-08-30 NOTE — LETTER
My Depression Action Plan  Name: Joann Gonzalez   Date of Birth 1960  Date: 8/30/2018    My doctor: Esau Ospina   My clinic: [address] [address]  [phone]          GREEN    ZONE   Good Control    What it looks like:     Things are going generally well. You have normal up s and down s. You may even feel depressed from time to time, but bad moods usually last less than a day.   What you need to do:  1. Continue to care for yourself (see self care plan)  2. Check your depression survival kit and update it as needed  3. Follow your physician s recommendations including any medication.  4. Do not stop taking medication unless you consult with your physician first.           YELLOW         ZONE Getting Worse    What it looks like:     Depression is starting to interfere with your life.     It may be hard to get out of bed; you may be starting to isolate yourself from others.    Symptoms of depression are starting to last most all day and this has happened for several days.     You may have suicidal thoughts but they are not constant.   What you need to do:     1. Call your care team, your response to treatment will improve if you keep your care team informed of your progress. Yellow periods are signs an adjustment may need to be made.     2. Continue your self-care, even if you have to fake it!    3. Talk to someone in your support network    4. Open up your depression survival kit           RED    ZONE Medical Alert - Get Help    What it looks like:     Depression is seriously interfering with your life.     You may experience these or other symptoms: You can t get out of bed most days, can t work or engage in other necessary activities, you have trouble taking care of basic hygiene, or basic responsibilities, thoughts of suicide or death that will not go away, self-injurious behavior.     What you need to  do:  1. Call your care team and request a same-day appointment. If they are not available (weekends or after hours) call your local crisis line, emergency room or 911.            Depression Self Care Plan / Survival Kit    Self-Care for Depression  Here s the deal. Your body and mind are really not as separate as most people think.  What you do and think affects how you feel and how you feel influences what you do and think. This means if you do things that people who feel good do, it will help you feel better.  Sometimes this is all it takes.  There is also a place for medication and therapy depending on how severe your depression is, so be sure to consult with your medical provider and/ or Behavioral Health Consultant if your symptoms are worsening or not improving.     In order to better manage my stress, I will:    Exercise  Get some form of exercise, every day. This will help reduce pain and release endorphins, the  feel good  chemicals in your brain. This is almost as good as taking antidepressants!  This is not the same as joining a gym and then never going! (they count on that by the way ) It can be as simple as just going for a walk or doing some gardening, anything that will get you moving.      Hygiene   Maintain good hygiene (Get out of bed in the morning, Make your bed, Brush your teeth, Take a shower, and Get dressed like you were going to work, even if you are unemployed).  If your clothes don't fit try to get ones that do.    Diet  I will strive to eat foods that are good for me, drink plenty of water, and avoid excessive sugar, caffeine, alcohol, and other mood-altering substances.  Some foods that are helpful in depression are: complex carbohydrates, B vitamins, flaxseed, fish or fish oil, fresh fruits and vegetables.    Psychotherapy  I agree to participate in Individual Therapy (if recommended).    Medication  If prescribed medications, I agree to take them.  Missing doses can result in serious  side effects.  I understand that drinking alcohol, or other illicit drug use, may cause potential side effects.  I will not stop my medication abruptly without first discussing it with my provider.    Staying Connected With Others  I will stay in touch with my friends, family members, and my primary care provider/team.    Use your imagination  Be creative.  We all have a creative side; it doesn t matter if it s oil painting, sand castles, or mud pies! This will also kick up the endorphins.    Witness Beauty  (AKA stop and smell the roses) Take a look outside, even in mid-winter. Notice colors, textures. Watch the squirrels and birds.     Service to others  Be of service to others.  There is always someone else in need.  By helping others we can  get out of ourselves  and remember the really important things.  This also provides opportunities for practicing all the other parts of the program.    Humor  Laugh and be silly!  Adjust your TV habits for less news and crime-drama and more comedy.    Control your stress  Try breathing deep, massage therapy, biofeedback, and meditation. Find time to relax each day.     My support system    Clinic Contact:  Phone number:    Contact 1:  Phone number:    Contact 2:  Phone number:    Mu-ism/:  Phone number:    Therapist:  Phone number:    Local crisis center:    Phone number:    Other community support:  Phone number:
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
## 2018-08-30 NOTE — NURSING NOTE
Injectable Influenza Immunization Documentation    1.  Are you sick today? (Fever of 100.5 or higher on the day of the clinic)   No    2.  Have you ever had Guillain-[place] Syndrome within 6 weeks of an influenza vaccionation?  No    3. Do you have a life-threatening allergy to eggs?  No    4. Do you have a life-threatening allergy to a component of the vaccine? May include antibiotics, gelatin or latex.  No     5. Have you ever had a reaction to a dose of flu vaccine that needed immediate medical attention?  No     Form completed by Princess PASTOR Solis CMA

## 2018-08-31 ASSESSMENT — PATIENT HEALTH QUESTIONNAIRE - PHQ9: SUM OF ALL RESPONSES TO PHQ QUESTIONS 1-9: 11

## 2018-08-31 ASSESSMENT — ANXIETY QUESTIONNAIRES: GAD7 TOTAL SCORE: 5

## 2018-09-04 LAB — PAIN DRUG SCR UR W RPTD MEDS: NORMAL

## 2018-09-28 ENCOUNTER — NURSE TRIAGE (OUTPATIENT)
Dept: NURSING | Facility: CLINIC | Age: 58
End: 2018-09-28

## 2018-09-29 ENCOUNTER — ANESTHESIA (OUTPATIENT)
Dept: SURGERY | Facility: CLINIC | Age: 58
DRG: 336 | End: 2018-09-29
Payer: MEDICARE

## 2018-09-29 ENCOUNTER — ANESTHESIA EVENT (OUTPATIENT)
Dept: SURGERY | Facility: CLINIC | Age: 58
DRG: 336 | End: 2018-09-29
Payer: MEDICARE

## 2018-09-29 ENCOUNTER — SURGERY (OUTPATIENT)
Age: 58
End: 2018-09-29

## 2018-09-29 ENCOUNTER — HOSPITAL ENCOUNTER (INPATIENT)
Facility: CLINIC | Age: 58
LOS: 3 days | Discharge: HOME | DRG: 336 | End: 2018-10-02
Attending: EMERGENCY MEDICINE | Admitting: INTERNAL MEDICINE
Payer: MEDICARE

## 2018-09-29 ENCOUNTER — APPOINTMENT (OUTPATIENT)
Dept: CT IMAGING | Facility: CLINIC | Age: 58
DRG: 336 | End: 2018-09-29
Attending: EMERGENCY MEDICINE
Payer: MEDICARE

## 2018-09-29 DIAGNOSIS — Z98.84 HISTORY OF GASTRIC BYPASS: ICD-10-CM

## 2018-09-29 DIAGNOSIS — K56.2 SMALL BOWEL VOLVULUS (H): ICD-10-CM

## 2018-09-29 PROBLEM — K45.0 OBSTRUCTED INTERNAL HERNIA: Status: ACTIVE | Noted: 2018-09-29

## 2018-09-29 LAB
ALBUMIN SERPL-MCNC: 3.9 G/DL (ref 3.4–5)
ALBUMIN UR-MCNC: NEGATIVE MG/DL
ALP SERPL-CCNC: 148 U/L (ref 40–150)
ALT SERPL W P-5'-P-CCNC: 19 U/L (ref 0–50)
ANION GAP SERPL CALCULATED.3IONS-SCNC: 4 MMOL/L (ref 3–14)
ANION GAP SERPL CALCULATED.3IONS-SCNC: 8 MMOL/L (ref 3–14)
APPEARANCE UR: CLEAR
AST SERPL W P-5'-P-CCNC: 28 U/L (ref 0–45)
BASOPHILS # BLD AUTO: 0 10E9/L (ref 0–0.2)
BASOPHILS NFR BLD AUTO: 0.4 %
BILIRUB SERPL-MCNC: 0.2 MG/DL (ref 0.2–1.3)
BILIRUB UR QL STRIP: NEGATIVE
BUN SERPL-MCNC: 7 MG/DL (ref 7–30)
BUN SERPL-MCNC: 8 MG/DL (ref 7–30)
CALCIUM SERPL-MCNC: 7.3 MG/DL (ref 8.5–10.1)
CALCIUM SERPL-MCNC: 9 MG/DL (ref 8.5–10.1)
CHLORIDE SERPL-SCNC: 107 MMOL/L (ref 94–109)
CHLORIDE SERPL-SCNC: 110 MMOL/L (ref 94–109)
CO2 SERPL-SCNC: 28 MMOL/L (ref 20–32)
CO2 SERPL-SCNC: 29 MMOL/L (ref 20–32)
COLOR UR AUTO: ABNORMAL
CREAT SERPL-MCNC: 0.6 MG/DL (ref 0.52–1.04)
CREAT SERPL-MCNC: 0.78 MG/DL (ref 0.52–1.04)
DIFFERENTIAL METHOD BLD: NORMAL
EOSINOPHIL # BLD AUTO: 0 10E9/L (ref 0–0.7)
EOSINOPHIL NFR BLD AUTO: 0.8 %
ERYTHROCYTE [DISTWIDTH] IN BLOOD BY AUTOMATED COUNT: 13.8 % (ref 10–15)
ERYTHROCYTE [DISTWIDTH] IN BLOOD BY AUTOMATED COUNT: 13.9 % (ref 10–15)
GFR SERPL CREATININE-BSD FRML MDRD: 75 ML/MIN/1.7M2
GFR SERPL CREATININE-BSD FRML MDRD: >90 ML/MIN/1.7M2
GLUCOSE SERPL-MCNC: 166 MG/DL (ref 70–99)
GLUCOSE SERPL-MCNC: 93 MG/DL (ref 70–99)
GLUCOSE UR STRIP-MCNC: NEGATIVE MG/DL
HCT VFR BLD AUTO: 29.3 % (ref 35–47)
HCT VFR BLD AUTO: 41.5 % (ref 35–47)
HGB BLD-MCNC: 10 G/DL (ref 11.7–15.7)
HGB BLD-MCNC: 14.4 G/DL (ref 11.7–15.7)
HGB BLD-MCNC: 9.3 G/DL (ref 11.7–15.7)
HGB UR QL STRIP: NEGATIVE
IMM GRANULOCYTES # BLD: 0 10E9/L (ref 0–0.4)
IMM GRANULOCYTES NFR BLD: 0.2 %
KETONES UR STRIP-MCNC: 10 MG/DL
LACTATE SERPL-SCNC: 1 MMOL/L (ref 0.4–2)
LEUKOCYTE ESTERASE UR QL STRIP: ABNORMAL
LIPASE SERPL-CCNC: 96 U/L (ref 73–393)
LYMPHOCYTES # BLD AUTO: 1.7 10E9/L (ref 0.8–5.3)
LYMPHOCYTES NFR BLD AUTO: 32.4 %
MAGNESIUM SERPL-MCNC: 1.6 MG/DL (ref 1.6–2.3)
MCH RBC QN AUTO: 30.7 PG (ref 26.5–33)
MCH RBC QN AUTO: 30.9 PG (ref 26.5–33)
MCHC RBC AUTO-ENTMCNC: 34.1 G/DL (ref 31.5–36.5)
MCHC RBC AUTO-ENTMCNC: 34.7 G/DL (ref 31.5–36.5)
MCV RBC AUTO: 89 FL (ref 78–100)
MCV RBC AUTO: 90 FL (ref 78–100)
MONOCYTES # BLD AUTO: 0.2 10E9/L (ref 0–1.3)
MONOCYTES NFR BLD AUTO: 4.7 %
MUCOUS THREADS #/AREA URNS LPF: PRESENT /LPF
NEUTROPHILS # BLD AUTO: 3.2 10E9/L (ref 1.6–8.3)
NEUTROPHILS NFR BLD AUTO: 61.5 %
NITRATE UR QL: NEGATIVE
NRBC # BLD AUTO: 0 10*3/UL
NRBC BLD AUTO-RTO: 0 /100
PH UR STRIP: 7 PH (ref 5–7)
PHOSPHATE SERPL-MCNC: 4 MG/DL (ref 2.5–4.5)
PLATELET # BLD AUTO: 178 10E9/L (ref 150–450)
PLATELET # BLD AUTO: 199 10E9/L (ref 150–450)
POTASSIUM SERPL-SCNC: 3.6 MMOL/L (ref 3.4–5.3)
POTASSIUM SERPL-SCNC: 4.1 MMOL/L (ref 3.4–5.3)
PROT SERPL-MCNC: 7.5 G/DL (ref 6.8–8.8)
RBC # BLD AUTO: 3.26 10E12/L (ref 3.8–5.2)
RBC # BLD AUTO: 4.66 10E12/L (ref 3.8–5.2)
RBC #/AREA URNS AUTO: 1 /HPF (ref 0–2)
SODIUM SERPL-SCNC: 142 MMOL/L (ref 133–144)
SODIUM SERPL-SCNC: 144 MMOL/L (ref 133–144)
SOURCE: ABNORMAL
SP GR UR STRIP: 1.02 (ref 1–1.03)
SQUAMOUS #/AREA URNS AUTO: 1 /HPF (ref 0–1)
UROBILINOGEN UR STRIP-MCNC: NORMAL MG/DL (ref 0–2)
WBC # BLD AUTO: 5.1 10E9/L (ref 4–11)
WBC # BLD AUTO: 6.9 10E9/L (ref 4–11)
WBC #/AREA URNS AUTO: 7 /HPF (ref 0–5)

## 2018-09-29 PROCEDURE — 85018 HEMOGLOBIN: CPT | Performed by: SURGERY

## 2018-09-29 PROCEDURE — 36000056 ZZH SURGERY LEVEL 3 1ST 30 MIN: Performed by: SURGERY

## 2018-09-29 PROCEDURE — 27210794 ZZH OR GENERAL SUPPLY STERILE: Performed by: SURGERY

## 2018-09-29 PROCEDURE — 25000132 ZZH RX MED GY IP 250 OP 250 PS 637: Mod: GY | Performed by: STUDENT IN AN ORGANIZED HEALTH CARE EDUCATION/TRAINING PROGRAM

## 2018-09-29 PROCEDURE — 12000007 ZZH R&B INTERMEDIATE

## 2018-09-29 PROCEDURE — 99207 ZZC CONSULT E&M CHANGED TO INITIAL LEVEL: CPT | Performed by: INTERNAL MEDICINE

## 2018-09-29 PROCEDURE — 99285 EMERGENCY DEPT VISIT HI MDM: CPT | Mod: 25

## 2018-09-29 PROCEDURE — 37000008 ZZH ANESTHESIA TECHNICAL FEE, 1ST 30 MIN: Performed by: SURGERY

## 2018-09-29 PROCEDURE — 85027 COMPLETE CBC AUTOMATED: CPT | Performed by: SURGERY

## 2018-09-29 PROCEDURE — 93010 ELECTROCARDIOGRAM REPORT: CPT | Performed by: INTERNAL MEDICINE

## 2018-09-29 PROCEDURE — 36415 COLL VENOUS BLD VENIPUNCTURE: CPT | Performed by: SURGERY

## 2018-09-29 PROCEDURE — 80048 BASIC METABOLIC PNL TOTAL CA: CPT | Performed by: SURGERY

## 2018-09-29 PROCEDURE — 83605 ASSAY OF LACTIC ACID: CPT | Performed by: EMERGENCY MEDICINE

## 2018-09-29 PROCEDURE — 81001 URINALYSIS AUTO W/SCOPE: CPT | Performed by: EMERGENCY MEDICINE

## 2018-09-29 PROCEDURE — 25000128 H RX IP 250 OP 636: Performed by: EMERGENCY MEDICINE

## 2018-09-29 PROCEDURE — 83735 ASSAY OF MAGNESIUM: CPT | Performed by: SURGERY

## 2018-09-29 PROCEDURE — 25000128 H RX IP 250 OP 636: Performed by: NURSE ANESTHETIST, CERTIFIED REGISTERED

## 2018-09-29 PROCEDURE — 40000281 ZZH STATISTIC TRANSPORT TIME EA 15 MIN

## 2018-09-29 PROCEDURE — 40000884 ZZH STATISTIC STEP DOWN HRS NIGHT

## 2018-09-29 PROCEDURE — P9041 ALBUMIN (HUMAN),5%, 50ML: HCPCS | Performed by: SURGERY

## 2018-09-29 PROCEDURE — 93005 ELECTROCARDIOGRAM TRACING: CPT

## 2018-09-29 PROCEDURE — 96361 HYDRATE IV INFUSION ADD-ON: CPT

## 2018-09-29 PROCEDURE — 25000125 ZZHC RX 250: Performed by: NURSE ANESTHETIST, CERTIFIED REGISTERED

## 2018-09-29 PROCEDURE — 94640 AIRWAY INHALATION TREATMENT: CPT

## 2018-09-29 PROCEDURE — 25800025 ZZH RX 258: Performed by: SURGERY

## 2018-09-29 PROCEDURE — 96374 THER/PROPH/DIAG INJ IV PUSH: CPT

## 2018-09-29 PROCEDURE — 25800025 ZZH RX 258: Performed by: STUDENT IN AN ORGANIZED HEALTH CARE EDUCATION/TRAINING PROGRAM

## 2018-09-29 PROCEDURE — 84100 ASSAY OF PHOSPHORUS: CPT | Performed by: SURGERY

## 2018-09-29 PROCEDURE — 40000886 ZZH STATISTIC STEP DOWN HRS DAY

## 2018-09-29 PROCEDURE — 25000125 ZZHC RX 250: Performed by: SURGERY

## 2018-09-29 PROCEDURE — 0DNU4ZZ RELEASE OMENTUM, PERCUTANEOUS ENDOSCOPIC APPROACH: ICD-10-PCS | Performed by: SURGERY

## 2018-09-29 PROCEDURE — 40000809 ZZH STATISTIC NO DOCUMENTATION TO SUPPORT CHARGE

## 2018-09-29 PROCEDURE — 83690 ASSAY OF LIPASE: CPT | Performed by: EMERGENCY MEDICINE

## 2018-09-29 PROCEDURE — 49659 UNLSTD LAPS PX HRNAP HRNRPHY: CPT | Performed by: SURGERY

## 2018-09-29 PROCEDURE — 25000128 H RX IP 250 OP 636: Performed by: STUDENT IN AN ORGANIZED HEALTH CARE EDUCATION/TRAINING PROGRAM

## 2018-09-29 PROCEDURE — 0DQV4ZZ REPAIR MESENTERY, PERCUTANEOUS ENDOSCOPIC APPROACH: ICD-10-PCS | Performed by: SURGERY

## 2018-09-29 PROCEDURE — 96375 TX/PRO/DX INJ NEW DRUG ADDON: CPT

## 2018-09-29 PROCEDURE — 74177 CT ABD & PELVIS W/CONTRAST: CPT

## 2018-09-29 PROCEDURE — 37000009 ZZH ANESTHESIA TECHNICAL FEE, EACH ADDTL 15 MIN: Performed by: SURGERY

## 2018-09-29 PROCEDURE — 40000885 ZZH STATISTIC STEP DOWN HRS EVENING

## 2018-09-29 PROCEDURE — 25000128 H RX IP 250 OP 636: Performed by: INTERNAL MEDICINE

## 2018-09-29 PROCEDURE — A9270 NON-COVERED ITEM OR SERVICE: HCPCS | Mod: GY | Performed by: STUDENT IN AN ORGANIZED HEALTH CARE EDUCATION/TRAINING PROGRAM

## 2018-09-29 PROCEDURE — 99222 1ST HOSP IP/OBS MODERATE 55: CPT | Performed by: INTERNAL MEDICINE

## 2018-09-29 PROCEDURE — 80053 COMPREHEN METABOLIC PANEL: CPT | Performed by: EMERGENCY MEDICINE

## 2018-09-29 PROCEDURE — 25000128 H RX IP 250 OP 636: Performed by: SURGERY

## 2018-09-29 PROCEDURE — 85025 COMPLETE CBC W/AUTO DIFF WBC: CPT | Performed by: EMERGENCY MEDICINE

## 2018-09-29 PROCEDURE — 25000566 ZZH SEVOFLURANE, EA 15 MIN: Performed by: SURGERY

## 2018-09-29 PROCEDURE — 36000058 ZZH SURGERY LEVEL 3 EA 15 ADDTL MIN: Performed by: SURGERY

## 2018-09-29 PROCEDURE — 25000125 ZZHC RX 250: Performed by: EMERGENCY MEDICINE

## 2018-09-29 PROCEDURE — 25000128 H RX IP 250 OP 636

## 2018-09-29 PROCEDURE — 71000012 ZZH RECOVERY PHASE 1 LEVEL 1 FIRST HR: Performed by: SURGERY

## 2018-09-29 PROCEDURE — 99222 1ST HOSP IP/OBS MODERATE 55: CPT | Mod: 57 | Performed by: SURGERY

## 2018-09-29 PROCEDURE — 40000170 ZZH STATISTIC PRE-PROCEDURE ASSESSMENT II: Performed by: SURGERY

## 2018-09-29 RX ORDER — SODIUM CHLORIDE, SODIUM LACTATE, POTASSIUM CHLORIDE, CALCIUM CHLORIDE 600; 310; 30; 20 MG/100ML; MG/100ML; MG/100ML; MG/100ML
INJECTION, SOLUTION INTRAVENOUS CONTINUOUS PRN
Status: DISCONTINUED | OUTPATIENT
Start: 2018-09-29 | End: 2018-09-29

## 2018-09-29 RX ORDER — ONDANSETRON 4 MG/1
4 TABLET, ORALLY DISINTEGRATING ORAL EVERY 30 MIN PRN
Status: DISCONTINUED | OUTPATIENT
Start: 2018-09-29 | End: 2018-09-29 | Stop reason: HOSPADM

## 2018-09-29 RX ORDER — IOPAMIDOL 755 MG/ML
62 INJECTION, SOLUTION INTRAVASCULAR ONCE
Status: COMPLETED | OUTPATIENT
Start: 2018-09-29 | End: 2018-09-29

## 2018-09-29 RX ORDER — OXYCODONE AND ACETAMINOPHEN 5; 325 MG/1; MG/1
1 TABLET ORAL DAILY PRN
COMMUNITY

## 2018-09-29 RX ORDER — DEXTROSE MONOHYDRATE, SODIUM CHLORIDE, AND POTASSIUM CHLORIDE 50; 1.49; 4.5 G/1000ML; G/1000ML; G/1000ML
INJECTION, SOLUTION INTRAVENOUS CONTINUOUS
Status: DISCONTINUED | OUTPATIENT
Start: 2018-09-29 | End: 2018-10-01

## 2018-09-29 RX ORDER — MAGNESIUM HYDROXIDE 1200 MG/15ML
LIQUID ORAL PRN
Status: DISCONTINUED | OUTPATIENT
Start: 2018-09-29 | End: 2018-09-29 | Stop reason: HOSPADM

## 2018-09-29 RX ORDER — PROCHLORPERAZINE MALEATE 10 MG
10 TABLET ORAL EVERY 6 HOURS PRN
Status: DISCONTINUED | OUTPATIENT
Start: 2018-09-29 | End: 2018-10-02 | Stop reason: HOSPADM

## 2018-09-29 RX ORDER — ONDANSETRON 4 MG/1
4 TABLET, ORALLY DISINTEGRATING ORAL EVERY 6 HOURS PRN
Status: DISCONTINUED | OUTPATIENT
Start: 2018-09-29 | End: 2018-10-02 | Stop reason: HOSPADM

## 2018-09-29 RX ORDER — HYDROMORPHONE HYDROCHLORIDE 1 MG/ML
.3-.5 INJECTION, SOLUTION INTRAMUSCULAR; INTRAVENOUS; SUBCUTANEOUS EVERY 5 MIN PRN
Status: DISCONTINUED | OUTPATIENT
Start: 2018-09-29 | End: 2018-09-29 | Stop reason: HOSPADM

## 2018-09-29 RX ORDER — FENTANYL CITRATE 50 UG/ML
INJECTION, SOLUTION INTRAMUSCULAR; INTRAVENOUS PRN
Status: DISCONTINUED | OUTPATIENT
Start: 2018-09-29 | End: 2018-09-29

## 2018-09-29 RX ORDER — QUETIAPINE FUMARATE 25 MG/1
50 TABLET, FILM COATED ORAL AT BEDTIME
Status: DISCONTINUED | OUTPATIENT
Start: 2018-09-29 | End: 2018-10-01

## 2018-09-29 RX ORDER — ONDANSETRON 2 MG/ML
INJECTION INTRAMUSCULAR; INTRAVENOUS PRN
Status: DISCONTINUED | OUTPATIENT
Start: 2018-09-29 | End: 2018-09-29

## 2018-09-29 RX ORDER — FENTANYL CITRATE 50 UG/ML
25-50 INJECTION, SOLUTION INTRAMUSCULAR; INTRAVENOUS
Status: DISCONTINUED | OUTPATIENT
Start: 2018-09-29 | End: 2018-09-29 | Stop reason: HOSPADM

## 2018-09-29 RX ORDER — DIPHENHYDRAMINE HCL 25 MG
25 CAPSULE ORAL EVERY 6 HOURS PRN
Status: DISCONTINUED | OUTPATIENT
Start: 2018-09-29 | End: 2018-10-02 | Stop reason: HOSPADM

## 2018-09-29 RX ORDER — LABETALOL HYDROCHLORIDE 5 MG/ML
10 INJECTION, SOLUTION INTRAVENOUS
Status: DISCONTINUED | OUTPATIENT
Start: 2018-09-29 | End: 2018-09-29 | Stop reason: HOSPADM

## 2018-09-29 RX ORDER — LIDOCAINE HYDROCHLORIDE 20 MG/ML
INJECTION, SOLUTION INFILTRATION; PERINEURAL PRN
Status: DISCONTINUED | OUTPATIENT
Start: 2018-09-29 | End: 2018-09-29

## 2018-09-29 RX ORDER — NALOXONE HYDROCHLORIDE 0.4 MG/ML
.1-.4 INJECTION, SOLUTION INTRAMUSCULAR; INTRAVENOUS; SUBCUTANEOUS
Status: DISCONTINUED | OUTPATIENT
Start: 2018-09-29 | End: 2018-10-02 | Stop reason: HOSPADM

## 2018-09-29 RX ORDER — CEFAZOLIN SODIUM 1 G/3ML
1 INJECTION, POWDER, FOR SOLUTION INTRAMUSCULAR; INTRAVENOUS SEE ADMIN INSTRUCTIONS
Status: DISCONTINUED | OUTPATIENT
Start: 2018-09-29 | End: 2018-09-29 | Stop reason: HOSPADM

## 2018-09-29 RX ORDER — LISINOPRIL 20 MG/1
20 TABLET ORAL DAILY
Status: DISCONTINUED | OUTPATIENT
Start: 2018-09-29 | End: 2018-09-29

## 2018-09-29 RX ORDER — ONDANSETRON 2 MG/ML
4 INJECTION INTRAMUSCULAR; INTRAVENOUS EVERY 6 HOURS PRN
Status: DISCONTINUED | OUTPATIENT
Start: 2018-09-29 | End: 2018-10-02 | Stop reason: HOSPADM

## 2018-09-29 RX ORDER — PROPOFOL 10 MG/ML
INJECTION, EMULSION INTRAVENOUS PRN
Status: DISCONTINUED | OUTPATIENT
Start: 2018-09-29 | End: 2018-09-29

## 2018-09-29 RX ORDER — CLONIDINE HYDROCHLORIDE 0.1 MG/1
0.1 TABLET ORAL 2 TIMES DAILY
Status: DISCONTINUED | OUTPATIENT
Start: 2018-09-29 | End: 2018-09-29

## 2018-09-29 RX ORDER — DEXAMETHASONE SODIUM PHOSPHATE 4 MG/ML
INJECTION, SOLUTION INTRA-ARTICULAR; INTRALESIONAL; INTRAMUSCULAR; INTRAVENOUS; SOFT TISSUE PRN
Status: DISCONTINUED | OUTPATIENT
Start: 2018-09-29 | End: 2018-09-29

## 2018-09-29 RX ORDER — VECURONIUM BROMIDE 1 MG/ML
INJECTION, POWDER, LYOPHILIZED, FOR SOLUTION INTRAVENOUS PRN
Status: DISCONTINUED | OUTPATIENT
Start: 2018-09-29 | End: 2018-09-29

## 2018-09-29 RX ORDER — QUETIAPINE FUMARATE 100 MG/1
100 TABLET, FILM COATED ORAL AT BEDTIME
Status: DISCONTINUED | OUTPATIENT
Start: 2018-09-29 | End: 2018-09-29

## 2018-09-29 RX ORDER — HYDROMORPHONE HYDROCHLORIDE 1 MG/ML
0.5 INJECTION, SOLUTION INTRAMUSCULAR; INTRAVENOUS; SUBCUTANEOUS
Status: COMPLETED | OUTPATIENT
Start: 2018-09-29 | End: 2018-09-29

## 2018-09-29 RX ORDER — LIDOCAINE 40 MG/G
CREAM TOPICAL
Status: DISCONTINUED | OUTPATIENT
Start: 2018-09-29 | End: 2018-10-02 | Stop reason: HOSPADM

## 2018-09-29 RX ORDER — AMLODIPINE BESYLATE 10 MG/1
10 TABLET ORAL DAILY
Status: DISCONTINUED | OUTPATIENT
Start: 2018-09-29 | End: 2018-09-29

## 2018-09-29 RX ORDER — CEFAZOLIN SODIUM 2 G/100ML
2 INJECTION, SOLUTION INTRAVENOUS
Status: COMPLETED | OUTPATIENT
Start: 2018-09-29 | End: 2018-09-29

## 2018-09-29 RX ORDER — MEPERIDINE HYDROCHLORIDE 25 MG/ML
12.5 INJECTION INTRAMUSCULAR; INTRAVENOUS; SUBCUTANEOUS EVERY 5 MIN PRN
Status: DISCONTINUED | OUTPATIENT
Start: 2018-09-29 | End: 2018-09-29 | Stop reason: HOSPADM

## 2018-09-29 RX ORDER — NALOXONE HYDROCHLORIDE 0.4 MG/ML
.1-.4 INJECTION, SOLUTION INTRAMUSCULAR; INTRAVENOUS; SUBCUTANEOUS
Status: DISCONTINUED | OUTPATIENT
Start: 2018-09-29 | End: 2018-09-29

## 2018-09-29 RX ORDER — SODIUM CHLORIDE, SODIUM LACTATE, POTASSIUM CHLORIDE, CALCIUM CHLORIDE 600; 310; 30; 20 MG/100ML; MG/100ML; MG/100ML; MG/100ML
INJECTION, SOLUTION INTRAVENOUS CONTINUOUS
Status: DISCONTINUED | OUTPATIENT
Start: 2018-09-29 | End: 2018-09-29 | Stop reason: HOSPADM

## 2018-09-29 RX ORDER — GLYCOPYRROLATE 0.2 MG/ML
INJECTION, SOLUTION INTRAMUSCULAR; INTRAVENOUS PRN
Status: DISCONTINUED | OUTPATIENT
Start: 2018-09-29 | End: 2018-09-29

## 2018-09-29 RX ORDER — NITROGLYCERIN 0.4 MG/1
0.4 TABLET SUBLINGUAL EVERY 5 MIN PRN
Status: DISCONTINUED | OUTPATIENT
Start: 2018-09-29 | End: 2018-10-02 | Stop reason: HOSPADM

## 2018-09-29 RX ORDER — ONDANSETRON 2 MG/ML
4 INJECTION INTRAMUSCULAR; INTRAVENOUS
Status: COMPLETED | OUTPATIENT
Start: 2018-09-29 | End: 2018-09-29

## 2018-09-29 RX ORDER — NEOSTIGMINE METHYLSULFATE 1 MG/ML
VIAL (ML) INJECTION PRN
Status: DISCONTINUED | OUTPATIENT
Start: 2018-09-29 | End: 2018-09-29

## 2018-09-29 RX ORDER — PREGABALIN 50 MG/1
50 CAPSULE ORAL 2 TIMES DAILY
Status: DISCONTINUED | OUTPATIENT
Start: 2018-09-29 | End: 2018-10-02 | Stop reason: HOSPADM

## 2018-09-29 RX ORDER — DIPHENHYDRAMINE HYDROCHLORIDE 50 MG/ML
25 INJECTION INTRAMUSCULAR; INTRAVENOUS EVERY 6 HOURS PRN
Status: DISCONTINUED | OUTPATIENT
Start: 2018-09-29 | End: 2018-10-02 | Stop reason: HOSPADM

## 2018-09-29 RX ORDER — ONDANSETRON 2 MG/ML
4 INJECTION INTRAMUSCULAR; INTRAVENOUS EVERY 30 MIN PRN
Status: DISCONTINUED | OUTPATIENT
Start: 2018-09-29 | End: 2018-09-29 | Stop reason: HOSPADM

## 2018-09-29 RX ORDER — ALBUMIN, HUMAN INJ 5% 5 %
12.5 SOLUTION INTRAVENOUS ONCE
Status: COMPLETED | OUTPATIENT
Start: 2018-09-29 | End: 2018-09-29

## 2018-09-29 RX ADMIN — SODIUM CHLORIDE 1000 ML: 900 IRRIGANT IRRIGATION at 03:41

## 2018-09-29 RX ADMIN — LISINOPRIL 20 MG: 20 TABLET ORAL at 09:47

## 2018-09-29 RX ADMIN — GLYCOPYRROLATE 0.6 MG: 0.2 INJECTION, SOLUTION INTRAMUSCULAR; INTRAVENOUS at 04:42

## 2018-09-29 RX ADMIN — PROPOFOL 200 MG: 10 INJECTION, EMULSION INTRAVENOUS at 02:54

## 2018-09-29 RX ADMIN — PHENYLEPHRINE HYDROCHLORIDE 100 MCG: 10 INJECTION, SOLUTION INTRAMUSCULAR; INTRAVENOUS; SUBCUTANEOUS at 02:54

## 2018-09-29 RX ADMIN — VECURONIUM BROMIDE 2 MG: 1 INJECTION, POWDER, LYOPHILIZED, FOR SOLUTION INTRAVENOUS at 04:00

## 2018-09-29 RX ADMIN — SODIUM CHLORIDE 500 ML: 9 INJECTION, SOLUTION INTRAVENOUS at 13:50

## 2018-09-29 RX ADMIN — ROCURONIUM BROMIDE 50 MG: 10 INJECTION INTRAVENOUS at 02:54

## 2018-09-29 RX ADMIN — HYDROMORPHONE HYDROCHLORIDE 0.5 MG: 1 INJECTION, SOLUTION INTRAMUSCULAR; INTRAVENOUS; SUBCUTANEOUS at 03:48

## 2018-09-29 RX ADMIN — MIDAZOLAM 2 MG: 1 INJECTION INTRAMUSCULAR; INTRAVENOUS at 02:48

## 2018-09-29 RX ADMIN — NEOSTIGMINE METHYLSULFATE 3 MG: 1 INJECTION, SOLUTION INTRAVENOUS at 04:42

## 2018-09-29 RX ADMIN — Medication 0.5 MG: at 00:48

## 2018-09-29 RX ADMIN — HYDROMORPHONE HYDROCHLORIDE 0.5 MG: 1 INJECTION, SOLUTION INTRAMUSCULAR; INTRAVENOUS; SUBCUTANEOUS at 03:02

## 2018-09-29 RX ADMIN — SODIUM CHLORIDE, POTASSIUM CHLORIDE, SODIUM LACTATE AND CALCIUM CHLORIDE: 600; 310; 30; 20 INJECTION, SOLUTION INTRAVENOUS at 03:59

## 2018-09-29 RX ADMIN — SODIUM CHLORIDE, POTASSIUM CHLORIDE, SODIUM LACTATE AND CALCIUM CHLORIDE: 600; 310; 30; 20 INJECTION, SOLUTION INTRAVENOUS at 02:30

## 2018-09-29 RX ADMIN — ONDANSETRON 4 MG: 2 INJECTION INTRAMUSCULAR; INTRAVENOUS at 00:48

## 2018-09-29 RX ADMIN — DEXAMETHASONE SODIUM PHOSPHATE 4 MG: 4 INJECTION, SOLUTION INTRA-ARTICULAR; INTRALESIONAL; INTRAMUSCULAR; INTRAVENOUS; SOFT TISSUE at 03:08

## 2018-09-29 RX ADMIN — FLUOXETINE 40 MG: 20 CAPSULE ORAL at 09:47

## 2018-09-29 RX ADMIN — LIDOCAINE HYDROCHLORIDE 28 ML: 10 INJECTION, SOLUTION INFILTRATION; PERINEURAL at 04:39

## 2018-09-29 RX ADMIN — SODIUM CHLORIDE, PRESERVATIVE FREE 60 ML: 5 INJECTION INTRAVENOUS at 01:10

## 2018-09-29 RX ADMIN — CEFAZOLIN SODIUM 2 G: 2 INJECTION, SOLUTION INTRAVENOUS at 02:58

## 2018-09-29 RX ADMIN — SODIUM CHLORIDE 1000 ML: 900 IRRIGANT IRRIGATION at 03:21

## 2018-09-29 RX ADMIN — CLONIDINE HYDROCHLORIDE 0.1 MG: 0.1 TABLET ORAL at 09:47

## 2018-09-29 RX ADMIN — POTASSIUM CHLORIDE, DEXTROSE MONOHYDRATE AND SODIUM CHLORIDE: 150; 5; 450 INJECTION, SOLUTION INTRAVENOUS at 08:30

## 2018-09-29 RX ADMIN — ONDANSETRON 4 MG: 2 INJECTION INTRAMUSCULAR; INTRAVENOUS at 04:36

## 2018-09-29 RX ADMIN — ALBUMIN HUMAN 12.5 G: 0.05 INJECTION, SOLUTION INTRAVENOUS at 16:00

## 2018-09-29 RX ADMIN — IOPAMIDOL 62 ML: 755 INJECTION, SOLUTION INTRAVENOUS at 01:10

## 2018-09-29 RX ADMIN — SODIUM CHLORIDE 500 ML: 9 INJECTION, SOLUTION INTRAVENOUS at 12:37

## 2018-09-29 RX ADMIN — Medication 0.5 MG: at 01:18

## 2018-09-29 RX ADMIN — DEXMEDETOMIDINE HYDROCHLORIDE 0.3 MCG/KG/HR: 100 INJECTION, SOLUTION INTRAVENOUS at 03:08

## 2018-09-29 RX ADMIN — SODIUM CHLORIDE 1000 ML: 9 INJECTION, SOLUTION INTRAVENOUS at 01:20

## 2018-09-29 RX ADMIN — NEOSTIGMINE METHYLSULFATE 1 MG: 1 INJECTION, SOLUTION INTRAVENOUS at 04:44

## 2018-09-29 RX ADMIN — HYDROMORPHONE HYDROCHLORIDE 1 MG: 1 INJECTION, SOLUTION INTRAMUSCULAR; INTRAVENOUS; SUBCUTANEOUS at 01:33

## 2018-09-29 RX ADMIN — SODIUM CHLORIDE 1000 ML: 900 IRRIGANT IRRIGATION at 04:30

## 2018-09-29 RX ADMIN — PREGABALIN 50 MG: 50 CAPSULE ORAL at 21:02

## 2018-09-29 RX ADMIN — PREGABALIN 50 MG: 50 CAPSULE ORAL at 09:49

## 2018-09-29 RX ADMIN — ONDANSETRON 4 MG: 2 INJECTION INTRAMUSCULAR; INTRAVENOUS at 01:19

## 2018-09-29 RX ADMIN — PROPOFOL 50 MG: 10 INJECTION, EMULSION INTRAVENOUS at 03:21

## 2018-09-29 RX ADMIN — Medication 12.5 MG: at 09:47

## 2018-09-29 RX ADMIN — Medication: at 05:29

## 2018-09-29 RX ADMIN — POTASSIUM CHLORIDE, DEXTROSE MONOHYDRATE AND SODIUM CHLORIDE: 150; 5; 450 INJECTION, SOLUTION INTRAVENOUS at 21:02

## 2018-09-29 RX ADMIN — FENTANYL CITRATE 100 MCG: 50 INJECTION, SOLUTION INTRAMUSCULAR; INTRAVENOUS at 02:54

## 2018-09-29 RX ADMIN — SODIUM CHLORIDE, POTASSIUM CHLORIDE, SODIUM LACTATE AND CALCIUM CHLORIDE: 600; 310; 30; 20 INJECTION, SOLUTION INTRAVENOUS at 03:00

## 2018-09-29 RX ADMIN — FENTANYL CITRATE 50 MCG: 50 INJECTION, SOLUTION INTRAMUSCULAR; INTRAVENOUS at 03:16

## 2018-09-29 RX ADMIN — SODIUM CHLORIDE, POTASSIUM CHLORIDE, SODIUM LACTATE AND CALCIUM CHLORIDE: 600; 310; 30; 20 INJECTION, SOLUTION INTRAVENOUS at 03:34

## 2018-09-29 RX ADMIN — LIDOCAINE HYDROCHLORIDE 60 MG: 20 INJECTION, SOLUTION INFILTRATION; PERINEURAL at 02:54

## 2018-09-29 ASSESSMENT — ENCOUNTER SYMPTOMS: DYSRHYTHMIAS: 0

## 2018-09-29 ASSESSMENT — ACTIVITIES OF DAILY LIVING (ADL)
ADLS_ACUITY_SCORE: 9
ADLS_ACUITY_SCORE: 7
ADLS_ACUITY_SCORE: 9
ADLS_ACUITY_SCORE: 7

## 2018-09-29 NOTE — ED PROVIDER NOTES
History     Chief Complaint:  Abdominal Pain     HPI   Joann Gonzalez is a 58 year old female on Plavix with a medical history of hypertension, stroke, and chronic low back pain who presents with abdominal pain. The patient arrived in the emergency department via EMS. The patient reports a new sudden onset of abdominal pain 4.5 hours ago while she was lying on the couch and watching TV. She also has associated nausea. She has a known hernia on the left side of her abdomen but does not think it has changed today. She has not taken any medications today for her pain. Patient last ate a sausage with muffin 8.5 hours ago. Of note, patient is currently taking 1 of percocet daily for chronic back pain. Denies vomiting, dysuria, or any other symptoms at this time. Patient has a surgical history of cholecystectomy, hysterectomy, and Rolux-En-Y gastric bypass performed by Dr. Newman in 2003. No history of bowel obstructions. Patient lives with her .    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     Review of Systems   All other systems reviewed and are negative.    Physical Exam   Patient Vitals for the past 24 hrs:   BP Temp Temp src Pulse Heart Rate Resp SpO2   09/29/18 0140 180/89 - - 74 - - 98 %   09/29/18 0123 187/89 - - - 70 - 98 %   09/29/18 0034 (!) 169/100 97.3  F (36.3  C) Oral 70 - 18 98 %      Physical  Exam  General: Very uncomfortable appearing woman sitting upright in room 10  HENT: mucous membranes moist   CV: regular rate  Resp: clear throughout, normal effort, no crackles or wheezing  GI: abdomen tender especially in the left side, without discrete masses palpable, no hernia is evident on exam when she is supine, she was not examined standing due to her severe pain, bowel sounds present  MSK: no bony tenderness, no CVAT  Skin: appropriately warm and dry, well healed abd scar  Neuro: alert, clear speech, oriented   Psych: anxious, cooperative      Emergency Department Course   Imaging:  Radiographic findings were communicated with the patient who voiced understanding of the findings.  CT Abdomen Pelvis w Contrast  1. Swirling about the root of the small bowel mesentery with secondary  narrowing of the superior mesenteric vein and edema within the  mesentery of a long segment of small bowel in the left hemiabdomen.  These findings could represent a small bowel volvulus. Of note, there  is no associated bowel dilatation or wall thickening. Recommend  correlation with the patient's symptoms.  2. No other cause of acute pain identified in the abdomen or pelvis.  As read by Radiology.     Laboratory:  CBC: WNL (WBC 5.1, HGB 14.4, )  CMP: (Creatinine 0.60)   Lipase: 96  Lactic acid (0137): 1.0  UA: Urineketon 10, Leukocyte esterase Moderate, WBC/HPF 7 (H), Mucous Present    Interventions:  0118: Dilaudid 0.5 mg IV  0119: Zofran 4 mg IV  0120: NS 1L IV Bolus   0133: Dilaudid 1 mg IV     Emergency Department Course:  Past medical records, nursing notes, and vitals reviewed.  0039: I performed an exam of the patient and obtained history, as documented above.   IV inserted and blood drawn for basic laboratory. Urine specimen obtained. Results as noted above.    The patient was sent for a CT abdomen pelvis while in the emergency department, findings above.   0129: I spoke with Dr. Luis of radiology regarding  "imaging findings.  0134: I rechecked the patient and updated the patient regarding the findings.   0137: Lactic acid obtained, findings as noted above.  0143: I discussed the case with Dr. Harrison of general surgery.  She will come to hospital to take patient urgently to OR.    Impression & Plan    Medical Decision Making:  Her acute abdominal pain can be explained by the volvulus seen on CT.  She is in severe pain despite multiple doses of IV opioids.  I think that her bowel is in jeopardy and she requires emergent surgery, and I have arranged for this thanks to the prompt response by Dr. Muñoz with General surgery.  The patient was taken to the preop area after discussion of these findings and her consent to this plan.    Diagnosis:    ICD-10-CM   1. Small bowel volvulus (H) K56.2   2. History of gastric bypass Z98.890     Disposition:  OR      This record was created at least in part using electronic voice recognition software, so please excuse any \"typos.\"    Mila Mederos  9/29/2018    EMERGENCY DEPARTMENT  I, Mila Mederos, am serving as a scribe at 12:39 AM on 9/29/2018 to document services personally performed by Alexander Green MD based on my observations and the provider's statements to me.      Alexander Green MD  09/29/18 0237    "

## 2018-09-29 NOTE — PROVIDER NOTIFICATION
Updated Dr. Harrison that patient still hypotensive and very lethargic. Did you wan the continues dose of PCA discharge?    -- Discharge the continue dose PCA  -- Give 500 ml NS bolus.

## 2018-09-29 NOTE — PROGRESS NOTES
Surgery Note    Pt hypotensive throughout the afternoon, intermittent response to fluid boluses. Labs unremarkable. hgb is down to 10 from 14 preoperatively but expect hemoconcentrated preoperatively. Will follow up hgb this evening. Abdomen is soft, appropriately tender. NGT in place with minimal output. Pt was very somnolent and continuous rate on PCA discontinued.   Will check EKG.     Suki Harrison MD  [facility]  [phone]

## 2018-09-29 NOTE — ED NOTES
[facility]  ED Nurse Handoff Report    ED Chief complaint: Abdominal Pain (abdominal pain for 6 hours, hx of a hernia )      ED Diagnosis:   Final diagnoses:   Small bowel volvulus (H)   History of gastric bypass       Code Status: Full Code    Allergies:   Allergies   Allergen Reactions     Advil Pm [Ibuprofen-Diphenhydramine Cit] Other (See Comments)     Or any ibuprophen  Reports red face     Aspirin Unknown     Nsaids      Plaquenil [Hydroxychloroquine] Rash       Activity level - Baseline/Home:  Independent    Activity Level - Current:   Stand with Assist     Needed?: No    Isolation: No  Infection: Not Applicable  Bariatric?: No    Vital Signs:   Vitals:    09/29/18 0034 09/29/18 0123 09/29/18 0140   BP: (!) 169/100 187/89 180/89   Pulse: 70  74   Resp: 18     Temp: 97.3  F (36.3  C)     TempSrc: Oral     SpO2: 98% 98% 98%       Cardiac Rhythm: ,        Pain level: 0-10 Pain Scale: 10    Is this patient confused?: No   [place] - Suicide Severity Rating Scale Completed?  Yes  If yes, what color did the patient score?  White    Patient Report: Initial Complaint: abdominal pain  Focused Assessment: abdominal pain since around 5pm.  Hx of gastric bypass.  CT diagnosed volvulus, patient taken to the OR for surgery  Tests Performed: labs, CT  Abnormal Results:   Labs Ordered and Resulted from Time of ED Arrival Up to the Time of Departure from the ED   CBC WITH PLATELETS DIFFERENTIAL   COMPREHENSIVE METABOLIC PANEL   LIPASE   ROUTINE UA WITH MICROSCOPIC   LACTIC ACID   PERIPHERAL IV CATHETER       Treatments provided: pain medications     Family Comments:     OBS brochure/video discussed/provided to patient/family: N/A              Name of person given brochure if not patient: n/a              Relationship to patient:     ED Medications:   Medications   HYDROmorphone (PF) (DILAUDID) injection 0.5 mg (0.5 mg Intravenous Given 9/29/18 0118)   ondansetron (ZOFRAN) injection 4 mg (4 mg  Intravenous Given 9/29/18 0119)   0.9% sodium chloride BOLUS (1,000 mLs Intravenous New Bag 9/29/18 0120)   iopamidol (ISOVUE-370) solution 62 mL (62 mLs Intravenous Given 9/29/18 0110)   Saline flush (60 mLs Intravenous Given 9/29/18 0110)   HYDROmorphone (DILAUDID) 1 MG/ML injection (1 mg  Given 9/29/18 0133)       Drips infusing?:  No    For the majority of the shift this patient was Green.   Interventions performed were none.    Severe Sepsis OR Septic Shock Diagnosis Present: No    To be done/followed up on inpatient unit:      ED NURSE PHONE NUMBER:

## 2018-09-29 NOTE — CONSULTS
[facility] General Surgery Consultation    Joann Gonzalez MRN# [id number]   YOB: 1960 Age: 58 year old      Date of Admission:  9/29/2018  Date of Consult: 9/29/2018         Assessment and Plan:   Patient is a 58 year old female with h/o open gastric bypass in 2003 who presents with abdominal pain and CT findings concerning for internal hernia with small bowel mesenteric edema and compression of SMV. Plan for emergent surgery with diagnostic laparosocpy, possible laparotomy, possible bowel resection. UA with positive leukocyte esterase - defer to hospitalist regarding coverage with abx for possible UTI.     PLAN:  NPO, IVF  Perioperative abx  NGT placement intraoperatively  Ruff placement intraoperatively  Hospitalist consult post operatively to assist in management of multiple medical problems         Requesting Physician:      Dr. Green        Chief Complaint:     Chief Complaint   Patient presents with     Abdominal Pain     abdominal pain for 6 hours, hx of a hernia           History of Present Illness:   Joann Gonzalez is a 58 year old female who has h/o CVA on plavix who was seen in consultation at the request of Dr. Green who presented with diffuse abdominal pain. She reports the pain began around 6pm yesterday. It worsened throughout the evening and she presented to the ER for evaluation at 12:30am. She has had associated nausea, no emesis. She denies history of similar pain. She had an open RYGB in 2003 with Dr. Newman. She had subsequent abdominoplasty. She had a normal bowel movement yesterday morning. Pain is severe and throughout her abdomen, she explains it as cramping and sharp. It does not radiate.            Physical Exam:   Blood pressure 180/89, pulse 74, temperature 97.3  F (36.3  C), temperature source Oral, resp. rate 18, SpO2 98 %, not currently breastfeeding.  0 lbs 0 oz  General: lying in bed, appears uncomfortable  Psych: Alert and Oriented.  Normal  affect  Neurological: grossly intact  Eyes: Sclera clear  Respiratory:  nonlabored breathing  Cardiovascular:  Regular Rate and Rhythm   GI: soft, tender throughout, voluntary guarding present, ventral hernia palpable and soft, well healed surgical scars   Lymphatic/Hematologic/Immune:  No femoral or cervical lymphadenopathy.  Integumentary:  No rashes         Past Medical History:     Past Medical History:   Diagnosis Date     Depression      Hypertension      Migraine 12/2013     Overdose      Stroke (H)      Unspecified cerebral artery occlusion with cerebral infarction 10/20/2012            Past Surgical History:     Past Surgical History:   Procedure Laterality Date     ABDOMEN SURGERY  2008 - Gallbladder     APPENDECTOMY  1997    done with hysterectomy     BACK SURGERY  2010 x 3 - Fusion     BACK SURGERY       CHOLECYSTECTOMY       ESOPHAGOSCOPY, GASTROSCOPY, DUODENOSCOPY (EGD), COMBINED N/A 7/11/2015    Procedure: COMBINED ESOPHAGOSCOPY, GASTROSCOPY, DUODENOSCOPY (EGD);  Surgeon: Sree Scanlon MD;  Location:  GI     GASTRIC BYPASS  2003     GASTRIC BYPASS       GI SURGERY       HYSTERECTOMY  1997     HYSTERECTOMY, PAP NO LONGER INDICATED  1997    hysterectomy            Current Medications:           ceFAZolin  1 g Intravenous See Admin Instructions     ceFAZolin  2 g Intravenous Pre-Op/Pre-procedure x 1 dose                Home Medications:     Prior to Admission medications    Medication Sig Last Dose Taking? Auth Provider   oxyCODONE-acetaminophen (PERCOCET) 5-325 MG per tablet Take 1 tablet by mouth every 4 hours as needed for moderate to severe pain 9/28/2018 at Unknown time Yes Esau Ospina MD   amLODIPine (NORVASC) 10 MG tablet Take 1 tablet (10 mg) by mouth daily   Esau Ospina MD   Cholecalciferol (VITAMIN D3) 1000 UNITS CAPS Take 1 capsule by mouth daily.   Esau Ospina MD   cloNIDine (CATAPRES) 0.1 MG tablet TAKE 1 TABLET BY MOUTH TWICE DAILY. MAY INCREASE TO 2 TABLET TWICE  DAILY IF BLOOD PRESSURE IS NOT BELOW 140/90   Esau Ospina MD   clopidogrel (PLAVIX) 75 MG tablet TAKE 1 TABLET(75 MG) BY MOUTH DAILY 9/27/2018  Esau Ospina MD   FLUoxetine (PROZAC) 20 MG capsule TAKE 2 CAPSULES BY MOUTH EVERY DAY   Esau Ospina MD   lisinopril (PRINIVIL/ZESTRIL) 20 MG tablet Take 1 tablet (20 mg) by mouth daily   Esau Ospina MD   Multiple Vitamin (MULTI-VITAMIN) per tablet Take 1 tablet by mouth daily.   Reported, Patient   Pregabalin (LYRICA PO) Take 50 mg by mouth 2 times daily    Unknown, Entered By History   QUEtiapine (SEROQUEL) 100 MG tablet TAKE 1 TABLET(100 MG) BY MOUTH AT BEDTIME   Esau Ospina MD   QUEtiapine (SEROQUEL) 25 MG tablet TAKE 1/2 TABLET BY MOUTH TWICE DAILY AS DIRECTED   Esau Ospina MD            Allergies:     Allergies   Allergen Reactions     Advil Pm [Ibuprofen-Diphenhydramine Cit] Other (See Comments)     Or any ibuprophen  Reports red face     Aspirin Unknown     Nsaids      Plaquenil [Hydroxychloroquine] Rash            Family History:     Family History   Problem Relation Age of Onset     Alzheimer Disease Mother      Hypertension Mother      Endocrine Disease Father            Social History:   Joann Gonzalez  reports that she has quit smoking. She started smoking about 8 months ago. She has a 15.00 pack-year smoking history. She has never used smokeless tobacco. She reports that she does not drink alcohol or use illicit drugs.          Review of Systems:   The 10 point Review of Systems is negative other than noted in the HPI.         Labs/Imaging   All new lab and imaging data was reviewed.   I have personally reviewed the imaging studies including her ct.         Suki Harrison MD

## 2018-09-29 NOTE — PHARMACY-ADMISSION MEDICATION HISTORY
Admission medication history interview status for the 9/29/2018  admission is complete. See EPIC admission navigator for prior to admission medications     Medication history source reliability:Good    Actions taken by pharmacist (provider contacted, etc): Interviewed patient, called patient's pharmacy to very strengths of medications on all prescription medications.      Additional medication history information not noted on PTA med list :None    Medication reconciliation/reorder completed by provider prior to medication history? Yes    Time spent in this activity: 15 minutes    Prior to Admission medications    Medication Sig Last Dose Taking? Auth Provider   amLODIPine (NORVASC) 10 MG tablet Take 1 tablet (10 mg) by mouth daily 9/27/2018 at am Yes Esau Ospina MD   clopidogrel (PLAVIX) 75 MG tablet TAKE 1 TABLET(75 MG) BY MOUTH DAILY 9/27/2018 at am Yes Esau Ospina MD   FLUoxetine (PROZAC) 20 MG capsule TAKE 2 CAPSULES BY MOUTH EVERY DAY 9/27/2018 at pm Yes Esau Ospina MD   lisinopril (PRINIVIL/ZESTRIL) 20 MG tablet Take 1 tablet (20 mg) by mouth daily 9/27/2018 at am Yes Esau Ospina MD   Multiple Vitamin (MULTI-VITAMIN) per tablet Take 1 tablet by mouth daily. 9/27/2018 at am Yes Reported, Patient   oxyCODONE-acetaminophen (PERCOCET) 5-325 MG per tablet Take 1 tablet by mouth daily as needed for severe pain 9/28/2018 at Unknown time Yes Unknown, Entered By History   QUEtiapine (SEROQUEL) 100 MG tablet TAKE 1 TABLET(100 MG) BY MOUTH AT BEDTIME 9/27/2018 at pm Yes Esau Ospina MD   QUEtiapine (SEROQUEL) 25 MG tablet TAKE 1/2 TABLET BY MOUTH TWICE DAILY AS DIRECTED 9/27/2018 at pm Yes Esau Ospina MD

## 2018-09-29 NOTE — BRIEF OP NOTE
[facility] Brief Operative Note    Pre-operative diagnosis: Obstructed internal hernia   Post-operative diagnosis Obstructed internal hernia   Procedure: Procedure(s):  Diagnostic Laparoscopy, Laparoscopic Lysis of Adhesion, Laparoscopic Internal Hernia Repair - Wound Class: II-Clean Contaminated   - Wound Class: I-Clean   - Wound Class: I-Clean   Surgeon(s): Surgeon(s) and Role:     * Suki Harrison MD - Primary     * Jules Estrada MD - Assisting   Estimated blood loss: 20 mL    Specimens: * No specimens in log *   Findings: 1.  Chylous ascites.   2.  Moderate small bowel adhesions to abdominal wall.  3.  No abdominal wall hernias seen.  Patient's report of hernia correlates to an area of eventration.  4.  Obstructed herniation of small bowel through mesocolic defect.     Post-op plan  Admit to surgery.  Consult hospitalist.  PCA for pain control.  Keep NGT, npo.  Hold clopidogrel and chemical DVT ppx today.  No need for antibiotics.    Jules Estrada MD  General surgery resident

## 2018-09-29 NOTE — IP AVS SNAPSHOT
MRN:[id number]                      After Visit Summary   9/29/2018    Joann Gonzalez    MRN: [id number]           Thank you!     Thank you for choosing [facility] for your care. Our goal is always to provide you with excellent care. Hearing back from our patients is one way we can continue to improve our services. Please take a few minutes to complete the written survey that you may receive in the mail after you visit with us. Thank you!        Patient Information     Date Of Birth          1960        Designated Caregiver       Most Recent Value    Caregiver    Will someone help with your care after discharge? no      About your hospital stay     You were admitted on:  September 29, 2018 You last received care in the:  [facility] [address] [facility]    You were discharged on:  October 2, 2018        Reason for your hospital stay       Internal hernia s/p laparoscopic repair                  Who to Call     For medical emergencies, please call 911.  For non-urgent questions about your medical care, please call your primary care provider or clinic, [phone]  For questions related to your surgery, please call your surgery clinic        Attending Provider     Provider Specialty    Alexander Green MD Emergency Medicine    Suki Harrison MD Surgery       Primary Care Provider Office Phone # Fax #    Esau Ospina [phone]      After Care Instructions     Activity       Your activity upon discharge: activity as tolerated, do not lift >15 lbs for 4 weeks.            Diet       Follow this diet upon discharge: Orders Placed This Encounter      Regular Diet Adult                  Follow-up Appointments     Follow-up and recommended labs and tests        Follow up with Suki crowe, within 1-2 weeks. to evaluate after surgery.  No follow up labs or test are needed. Please call [phone] to schedule. Please follow up with your PCP within one  week of discharge.                  Further instructions from your care team       [facility]  Discharge Instructions: Post-Operative Abdominal Surgery    ACTIVITY    Increase your activity gradually.  Avoid strenuous physical activity or heavy lifting greater than 15 lbs. for 4 weeks.  You may climb stairs.    You may drive without restrictions when you are not using any prescription pain medication and comfortable in a car.    You may return to work/school when you are comfortable without any prescription pain medication.    WOUND CARE    You may remove your bandage and shower 48 hours after the surgery.  Pat your incisions dry and leave open to air.  Re-apply dressing (Band-aid or gauze/tape) as needed for drainage.    You may have steri-strips (looks like tape) or Dermabond (looks like glue) on your incision.  Leave it alone, it will peel up and fall off on its own.      Do not soak your incisions in a tub or pool for 2 weeks.     A lump or ridge under the incision is normal and will gradually resolve.    DIET    Advance your diet to a low residue diet for 2 weeks.  Avoid heavy, spicy, greasy meals and gas forming foods.     You may find your appetite to be diminished briefly after surgery.  You may take nutritional supplement shakes if you are able.     Drinks plenty of fluids to stay hydrated.    PAIN    Expect some tenderness and discomfort at the incision site(s).  Use the prescribed pain medication at your discretion.  Expect gradual resolution of your pain over several days.    You may take ibuprofen with food (unless you have been told not to) instead of or in addition to your prescribed pain medication.     Do not drink alcohol or drive while you are taking pain medications.    You may apply ice to your incisions in 20 minute intervals as needed for the next 24-48 hours.  After that time, consider switching to heat if you prefer.    RETURN APPOINTMENT    Follow up  with your surgeon in 10 days.  Please call the office at [phone] to schedule your appointment.  We are located at [address] [place] [address]  [place] [address].    CALL OUR OFFICE IF YOU HAVE:     Chills or fever above 101.5 F.    Increased redness or drainage at your incisions.    Significant bleeding.    Pain not relieved by your pain medication or rest.    Increasing pain after the first 48 hours.    Any other concerns or questions.    HELPFUL HINTS    Pain medications can cause constipation.  Limit use when possible.  Take over the counter stool softener/stimulant, such as Colace or Senna, with plenty of water.  You may take a mild over the counter laxative, such as Miralax or a suppository, as needed.    For laparoscopic surgery, you may have shoulder or upper back discomfort due to the gas used in surgery.  This is temporary and should resolve in 48-72 hours.  Short, frequent walks may help with this.                                                                                                                                  Revised August 2017      Pending Results     Date and Time Order Name Status Description    9/29/2018 2122 EKG 12-lead, tracing only Preliminary             Statement of Approval     Ordered          10/02/18 1045  I have reviewed and agree with all the recommendations and orders detailed in this document.  EFFECTIVE NOW     Approved and electronically signed by:  Carlitos Garcia DO             Admission Information     Date & Time Provider Department Dept. Phone    9/29/2018 Suki Harrison MD [place] [address] [facility] [phone]      Your Vitals Were     Blood Pressure Pulse Temperature Respirations Pulse Oximetry       149/82 (BP Location: Right arm) 79 97.7  F (36.5  C) (Oral) 16 99%       Care EveryWhere ID     This is your Care EveryWhere ID. This could be used by other organizations to access your [facility] medical records  [id number]         Equal Access to Services     [facility]: Hadii aad ku hadlorenzabairon Trishananya, waaxda luqadaha, qaybta kaalmaaiden figueroa, leigh ann novak. So [facility] [phone].    ATENCIÓN: Si habla español, tiene a lugo disposición servicios gratuitos de asistencia lingüística. Llame al [phone].    We comply with applicable federal civil rights laws and [place] laws. We do not discriminate on the basis of race, color, national origin, age, disability, sex, sexual orientation, or gender identity.               Review of your medicines      START taking        Dose / Directions    oxyCODONE IR 5 MG tablet   Commonly known as:  ROXICODONE   Used for:  Small bowel volvulus (H)        Dose:  5-10 mg   Take 1-2 tablets (5-10 mg) by mouth every 3 hours as needed for moderate to severe pain   Quantity:  30 tablet   Refills:  0       polyethylene glycol Packet   Commonly known as:  MIRALAX/GLYCOLAX   Used for:  Small bowel volvulus (H)        Dose:  17 g   Take 17 g by mouth 2 times daily   Quantity:  21 packet   Refills:  1         CONTINUE these medicines which have NOT CHANGED        Dose / Directions    amLODIPine 10 MG tablet   Commonly known as:  NORVASC   Used for:  Benign essential hypertension        Dose:  10 mg   Take 1 tablet (10 mg) by mouth daily   Quantity:  30 tablet   Refills:  3       clopidogrel 75 MG tablet   Commonly known as:  PLAVIX   Used for:  Transient cerebral ischemia, unspecified type        TAKE 1 TABLET(75 MG) BY MOUTH DAILY   Quantity:  90 tablet   Refills:  2       FLUoxetine 20 MG capsule   Commonly known as:  PROzac   Used for:  Major depressive disorder, single episode in full remission (H), Anxiety state        TAKE 2 CAPSULES BY MOUTH EVERY DAY   Quantity:  180 capsule   Refills:  3       lisinopril 20 MG tablet   Commonly known as:  PRINIVIL/ZESTRIL   Used for:  Essential hypertension        Dose:  20 mg   Take 1 tablet (20 mg) by mouth daily   Quantity:  90 tablet    Refills:  3       Multi-vitamin Tabs tablet   Generic drug:  multivitamin, therapeutic with minerals        Dose:  1 tablet   Take 1 tablet by mouth daily.   Refills:  0       oxyCODONE-acetaminophen 5-325 MG per tablet   Commonly known as:  PERCOCET        Dose:  1 tablet   Take 1 tablet by mouth daily as needed for severe pain   Refills:  0       * QUEtiapine 100 MG tablet   Commonly known as:  SEROquel   Used for:  Major depression in complete remission (H)        TAKE 1 TABLET(100 MG) BY MOUTH AT BEDTIME   Quantity:  90 tablet   Refills:  2       * QUEtiapine 25 MG tablet   Commonly known as:  SEROquel   Used for:  Major depression in complete remission (H)        TAKE 1/2 TABLET BY MOUTH TWICE DAILY AS DIRECTED   Quantity:  90 tablet   Refills:  0       * Notice:  This list has 2 medication(s) that are the same as other medications prescribed for you. Read the directions carefully, and ask your doctor or other care provider to review them with you.         Where to get your medicines      These medications were sent to [facility] [address] [place] [address] AVE [address] AT [facility] [address] [place] [address] [facility] [address]     Phone:  [phone]     polyethylene glycol Packet         Some of these will need a paper prescription and others can be bought over the counter. Ask your nurse if you have questions.     Bring a paper prescription for each of these medications     oxyCODONE IR 5 MG tablet                Protect others around you: Learn how to safely use, store and throw away your medicines at www.disposemymeds.org.        Information about OPIOIDS     PRESCRIPTION OPIOIDS: WHAT YOU NEED TO KNOW   We gave you an opioid (narcotic) pain medicine. It is important to manage your pain, but opioids are not always the best choice. You should first try all the other options your care team gave you. Take this medicine for as short a time (and as few doses) as  possible.    Some activities can increase your pain, such as bandage changes or therapy sessions. It may help to take your pain medicine 30 to 60 minutes before these activities. Reduce your stress by getting enough sleep, working on hobbies you enjoy and practicing relaxation or meditation. Talk to your care team about ways to manage your pain beyond prescription opioids.    These medicines have risks:    DO NOT drive when on new or higher doses of pain medicine. These medicines can affect your alertness and reaction times, and you could be arrested for driving under the influence (DUI). If you need to use opioids long-term, talk to your care team about driving.    DO NOT operate heavy machinery    DO NOT do any other dangerous activities while taking these medicines.    DO NOT drink any alcohol while taking these medicines.     If the opioid prescribed includes acetaminophen, DO NOT take with any other medicines that contain acetaminophen. Read all labels carefully. Look for the word  acetaminophen  or  Tylenol.  Ask your pharmacist if you have questions or are unsure.    You can get addicted to pain medicines, especially if you have a history of addiction (chemical, alcohol or substance dependence). Talk to your care team about ways to reduce this risk.    All opioids tend to cause constipation. Drink plenty of water and eat foods that have a lot of fiber, such as fruits, vegetables, prune juice, apple juice and high-fiber cereal. Take a laxative (Miralax, milk of magnesia, Colace, Senna) if you don t move your bowels at least every other day. Other side effects include upset stomach, sleepiness, dizziness, throwing up, tolerance (needing more of the medicine to have the same effect), physical dependence and slowed breathing.    Store your pills in a secure place, locked if possible. We will not replace any lost or stolen medicine. If you don t finish your medicine, please throw away (dispose) as directed by your  pharmacist. The [place] Pollution [employer] has more information about safe disposal: https://www.pca.state.[place]/living-green/managing-unwanted-medications             Medication List: This is a list of all your medications and when to take them. Check marks below indicate your daily home schedule. Keep this list as a reference.      Medications           Morning Afternoon Evening Bedtime As Needed    amLODIPine 10 MG tablet   Commonly known as:  NORVASC   Take 1 tablet (10 mg) by mouth daily                                clopidogrel 75 MG tablet   Commonly known as:  PLAVIX   TAKE 1 TABLET(75 MG) BY MOUTH DAILY   Last time this was given:  75 mg on 10/2/2018  9:13 AM                                FLUoxetine 20 MG capsule   Commonly known as:  PROzac   TAKE 2 CAPSULES BY MOUTH EVERY DAY   Last time this was given:  40 mg on 10/2/2018  9:13 AM                                lisinopril 20 MG tablet   Commonly known as:  PRINIVIL/ZESTRIL   Take 1 tablet (20 mg) by mouth daily   Last time this was given:  20 mg on 9/29/2018  9:47 AM                                Multi-vitamin Tabs tablet   Take 1 tablet by mouth daily.   Generic drug:  multivitamin, therapeutic with minerals                                oxyCODONE IR 5 MG tablet   Commonly known as:  ROXICODONE   Take 1-2 tablets (5-10 mg) by mouth every 3 hours as needed for moderate to severe pain   Last time this was given:  10 mg on 10/2/2018 11:09 AM                                oxyCODONE-acetaminophen 5-325 MG per tablet   Commonly known as:  PERCOCET   Take 1 tablet by mouth daily as needed for severe pain                                polyethylene glycol Packet   Commonly known as:  MIRALAX/GLYCOLAX   Take 17 g by mouth 2 times daily   Last time this was given:  17 g on 10/2/2018  9:14 AM                                * QUEtiapine 100 MG tablet   Commonly known as:  SEROquel   TAKE 1 TABLET(100 MG) BY MOUTH AT BEDTIME   Last time this was given:   12.5 mg on 10/2/2018  9:13 AM                                * QUEtiapine 25 MG tablet   Commonly known as:  SEROquel   TAKE 1/2 TABLET BY MOUTH TWICE DAILY AS DIRECTED   Last time this was given:  12.5 mg on 10/2/2018  9:13 AM                                * Notice:  This list has 2 medication(s) that are the same as other medications prescribed for you. Read the directions carefully, and ask your doctor or other care provider to review them with you.

## 2018-09-29 NOTE — IP AVS SNAPSHOT
[facility] [address] [facility]    [address] [place] [address]    Phone:  [phone]                                       After Visit Summary   9/29/2018    Joann Gonzalez    MRN: [id number]           After Visit Summary Signature Page     I have received my discharge instructions, and my questions have been answered. I have discussed any challenges I see with this plan with the nurse or doctor.    ..........................................................................................................................................  Patient/Patient Representative Signature      ..........................................................................................................................................  Patient Representative Print Name and Relationship to Patient    ..................................................               ................................................  Date                                   Time    ..........................................................................................................................................  Reviewed by Signature/Title    ...................................................              ..............................................  Date                                               Time          22EPIC Rev 08/18

## 2018-09-29 NOTE — OP NOTE
General Surgery Operative Note    PREOPERATIVE DIAGNOSIS: small bowel volvulus    POSTOPERATIVE DIAGNOSIS: internal hernia due to Carlson defect at root of transverse colon mesentery    PROCEDURE:   1. Diagnostic laparoscopy  2. Laparoscopic lysis of adhesions  3. Laparoscopic reduction of internal hernia  4. Laparoscopic repair of carlson defect    SURGEON:  Suki Harrison MD    ASSISTANT:  Conner Estrada MD [facility] Resident  The resident s assistance was medically necessary to provide adequate exposure in the operating field, maintain hemostasis, cutting suture, clamping and ligating bleeding vessels, and visualization of anatomic structures throughout the surgical procedure.     ANESTHESIA:  General.    BLOOD LOSS: 20ml    FINDINGS: moderate amount of intraabdominal adhesions present to undersurface of abdominal wall, small bowel internal hernia due to carlson defect    INDICATIONS:   Joann is a 58yof who has a [facility] s/f open gastric bypass in 2003, laparoscopic cholecystectomy and hysterectomy who presented with acute abdominal pain and nausea. An abdominal CT was completed and concerning for closed loop small bowel obstruction vs small bowel volvulus. We discussed options and she elected to proceed with emergent diagnostic laparoscopy.     DETAILS OF PROCEDURE: The patient was brought to the operating room per Anesthesia, placed in supine position with her arms tucked at her sides, and intubated without difficulty. A nasogastric tube was placed and secured at the nare. A dubois catheter was placed using sterile technique. Perioperative antibiotics were administered. The abdomen was prepped and draped in standard fashion. A Surgical timeout was then performed, verifying the correct surgeon, site, procedure, and patient and all in the room were in agreement.     A small incision was made in the left upper abdomen. A 5mm 0 degree laparoscope was used to enter the abdomen using visiport technique. The abdomen was  entered and insufflation tubing connected with pressure set to 15mm Hg which the patient tolerated well. The abdomen was surveyed. There were omental adhesions present to the undersurface of the midline lower and upper abdomen as well as dilated small bowel proximally and milky ascites throughout the abdomen. Of note there was not a fascial defect present in the left lower abdomen at an area of concern for possible hernia, this appeared more consistent with attenuated muscle and abdominal wall weakness and not a true hernia. Three more 5mm ports were placed, two on the left mid and lower abdomen and one on the right lower abdomen. Using the ligasure device the omental adhesions were divided until the anterior abdominal wall was clear. There were adhesions superiorly at the level of the left lateral aspect of the liver which were left in place. We then placed the patient in trendelenburg position and ran the small bowel from the terminal ileum proximally. This was done very carefully and meticulously. At approximately the mid jejunum there was tension and with repositioning the patient I was able to reduce the apparent internal hernia and continued running the bowel until I reached the jejunojejunal anastomosis. The ellis limb was followed proximally and traveled in a retrocolic fashion. The digestive limb was followed proximally to the ligament of treitz. The loop of small bowel just distal to the jejunuojejunal anastomosis was clearly dilated and the mesentery was extremely edematous with chylous fluid present and the mesentery had patchy areas of whiteness. We then directed our attention at the jejunojejunal anastomosis. There was no evidence of a defect in the mesentery here. We then evaluated the transverse colon mesentery as the ellsi limb traveled through it and found a small 2cm opening consistent with a cralson type defect. This was closed laparoscopically using a 3-0 vicryl suture in a figure of eight  fashion. We then ran the bowel again from the terminal ileum to the jejunojejunal anastomosis and there was no issue noted. There was a small mesenteric hematoma noted from grasping the bowel which was stable and not expanding and the bowel appeared healthy. We again followed the ellis limb proximally and this appeared normal. The digestive limb was also followed and normal. The abdomen was irrigated with several liters of warm sterile saline until the irrigant was clear. The ports were removed under direct visualization and there was no bleeding. The abdomen was desufflated. The incisions were closed with interrupted 4-0 monocryl sutures. Steri strips and bandaids were placed. The patient tolerated the procedure well and was taken to PACU in stable condition. All instrument, needle and sponge counts were correct at the conclusion of the procedure x2.     Suki Harrison MD

## 2018-09-29 NOTE — PLAN OF CARE
Problem: Patient Care Overview  Goal: Plan of Care/Patient Progress Review  Outcome: Improving  Pt arrived on unit at 0600. A+OX4, VSS on 2L NC. Capno monitoring. 4X lap sites on abdomen covered with steri-strips and bandaids- CDI. NG tube patent and set to LIS. PCA on with continuous rate of 0.1/hr. Iv fluids running at 75/hr. NPO except for ice chips. Ruff patent. Admission complete, will continue to monitor.

## 2018-09-29 NOTE — PLAN OF CARE
Problem: Patient Care Overview  Goal: Plan of Care/Patient Progress Review  Outcome: No Change  VSS except hypotensive. Running 80 systolic, asymptomatic. Two 500 mL bolus given per md orders. Lethargic orders to discontinue continuous PCA. Capno in place. LS diminished. BS hypoactive. Ruff in place, adequate output. Lap sites with band aids and steri strips with scant drainage. NG tube to low, intermittent suction. Not passing gas and no BM. Per surgery pt to be put on IMC due to hypotension.

## 2018-09-29 NOTE — PROVIDER NOTIFICATION
Text paged Dr. Hills regarding patient B/P 70-92 systolic. Not symptomatic at this time.    Give 500 ml NS bolus

## 2018-09-29 NOTE — TELEPHONE ENCOUNTER
"  Reason for Disposition    [1] SEVERE pain (e.g., excruciating) AND [2] present > 1 hour    Additional Information    Negative: Shock suspected (e.g., cold/pale/clammy skin, too weak to stand, low BP, rapid pulse)    Negative: Difficult to awaken or acting confused  (e.g., disoriented, slurred speech)    Negative: Passed out (i.e., lost consciousness, collapsed and was not responding)    Negative: Sounds like a life-threatening emergency to the triager    Negative: Chest pain    Negative: Pain is mainly in upper abdomen  (if needed ask: \"is it mainly above the belly button?\")    Negative: Followed an abdomen (stomach) injury    Negative: [1] Abdominal pain AND [2] pregnant < 20 weeks    Negative: [1] Abdominal pain AND [2] pregnant > 20 weeks    Negative: [1] Abdominal pain AND [2] postpartum < 1 month since delivery    Protocols used: ABDOMINAL PAIN - FEMALE-ADULT-    Joann says that she has a left, umbilical hernia. Denies vomiting. Pt. Is going to call 911, and then will go to an ER now.  "

## 2018-09-29 NOTE — ANESTHESIA CARE TRANSFER NOTE
Patient: Joann Gonzalez    Procedure(s):  Diagnostic Laparoscopy, Laparoscopic Lysis of Adhesion, Laparoscopic Internal Hernia Repair - Wound Class: II-Clean Contaminated   - Wound Class: I-Clean   - Wound Class: I-Clean    Diagnosis: Laparoscopy, possible laparotomy, possible bowel resection  Diagnosis Additional Information: No value filed.    Anesthesia Type:   General, RSI, ETT     Note:  Airway :Face Mask  Patient transferred to:PACU  Comments: VSS on arrival to PACU. Alert and talking, on 8L SFM 02. Report given to RN before transfer of patient care.Handoff Report: Identifed the Patient, Identified the Reponsible Provider, Reviewed the pertinent medical history, Discussed the surgical course, Reviewed Intra-OP anesthesia mangement and issues during anesthesia, Set expectations for post-procedure period and Allowed opportunity for questions and acknowledgement of understanding      Vitals: (Last set prior to Anesthesia Care Transfer)    CRNA VITALS  9/29/2018 0427 - 9/29/2018 0502      9/29/2018             Resp Rate (set): 10                Electronically Signed By: ROMEO Belcher CRNA  September 29, 2018  5:02 AM

## 2018-09-29 NOTE — ANESTHESIA POSTPROCEDURE EVALUATION
Patient: Joann Gonzalez    Procedure(s):  Diagnostic Laparoscopy, Laparoscopic Lysis of Adhesion, Laparoscopic Internal Hernia Repair - Wound Class: II-Clean Contaminated   - Wound Class: I-Clean   - Wound Class: I-Clean    Diagnosis:Laparoscopy, possible laparotomy, possible bowel resection  Diagnosis Additional Information: No value filed.    Anesthesia Type:  General, RSI, ETT    Note:  Anesthesia Post Evaluation    Patient location during evaluation: PACU  Patient participation: Able to fully participate in evaluation  Level of consciousness: sleepy but conscious and responsive to verbal stimuli  Pain management: adequate  Airway patency: patent  Cardiovascular status: acceptable and hemodynamically stable  Respiratory status: acceptable and unassisted  Hydration status: acceptable  PONV: none     Anesthetic complications: None          Last vitals:  Vitals:    09/29/18 0547 09/29/18 0602 09/29/18 0630   BP:  153/79 145/77   Pulse:      Resp: 16 16 12   Temp:  36.4  C (97.5  F)    SpO2: 100% 99% 97%         Electronically Signed By: Carlos Loyola MD  September 29, 2018  6:50 AM

## 2018-09-29 NOTE — ANESTHESIA PREPROCEDURE EVALUATION
Procedure: Procedure(s):  LAPAROSCOPY DIAGNOSTIC (GENERAL)  COMBINED COLECTOMY WITH COLOSTOMY  Preop diagnosis: Laparoscopy, possible laparotomy, possible bowel resection    Allergies   Allergen Reactions     Advil Pm [Ibuprofen-Diphenhydramine Cit] Other (See Comments)     Or any ibuprophen  Reports red face     Aspirin Unknown     Nsaids      Plaquenil [Hydroxychloroquine] Rash     Past Medical History:   Diagnosis Date     Depression      Hypertension      Migraine 12/2013     Overdose      Stroke (H)      Unspecified cerebral artery occlusion with cerebral infarction 10/20/2012     Past Surgical History:   Procedure Laterality Date     ABDOMEN SURGERY  2008 - Gallbladder     APPENDECTOMY  1997    done with hysterectomy     BACK SURGERY  2010 x 3 - Fusion     BACK SURGERY       CHOLECYSTECTOMY       ESOPHAGOSCOPY, GASTROSCOPY, DUODENOSCOPY (EGD), COMBINED N/A 7/11/2015    Procedure: COMBINED ESOPHAGOSCOPY, GASTROSCOPY, DUODENOSCOPY (EGD);  Surgeon: Sree Scanlon MD;  Location:  GI     GASTRIC BYPASS  2003     GASTRIC BYPASS       GI SURGERY       HYSTERECTOMY  1997     HYSTERECTOMY, PAP NO LONGER INDICATED  1997    hysterectomy     Social History   Substance Use Topics     Smoking status: Former Smoker     Packs/day: 1.00     Years: 15.00     Start date: 1/1/2018     Smokeless tobacco: Never Used     Alcohol use No      Comment: 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     Prior to Admission medications    Medication Sig Start Date End Date Taking? Authorizing Provider   amLODIPine (NORVASC) 10 MG tablet Take 1 tablet (10 mg) by mouth daily 8/30/18   Esau Ospina MD   Cholecalciferol (VITAMIN D3) 1000 UNITS CAPS Take 1 capsule by mouth daily. 6/21/13   Esau Ospina MD   cloNIDine (CATAPRES) 0.1 MG tablet TAKE 1 TABLET BY MOUTH TWICE DAILY. MAY INCREASE TO 2 TABLET TWICE DAILY IF BLOOD PRESSURE IS NOT BELOW 140/90 10/13/16   Esau Ospina MD   clopidogrel (PLAVIX) 75 MG tablet TAKE  1 TABLET(75 MG) BY MOUTH DAILY 3/13/18   Esau Ospina MD   FLUoxetine (PROZAC) 20 MG capsule TAKE 2 CAPSULES BY MOUTH EVERY DAY 8/30/18   Esau Ospina MD   lisinopril (PRINIVIL/ZESTRIL) 20 MG tablet Take 1 tablet (20 mg) by mouth daily 8/30/18   Esau Ospina MD   Multiple Vitamin (MULTI-VITAMIN) per tablet Take 1 tablet by mouth daily.    Reported, Patient   oxyCODONE-acetaminophen (PERCOCET) 5-325 MG per tablet Take 1 tablet by mouth every 4 hours as needed for moderate to severe pain 4/21/16   Esau Ospina MD   Pregabalin (LYRICA PO) Take 50 mg by mouth 2 times daily     Unknown, Entered By History   QUEtiapine (SEROQUEL) 100 MG tablet TAKE 1 TABLET(100 MG) BY MOUTH AT BEDTIME 2/22/18   Esau Ospina MD   QUEtiapine (SEROQUEL) 25 MG tablet TAKE 1/2 TABLET BY MOUTH TWICE DAILY AS DIRECTED 8/29/18   Esau Ospina MD     No current Epic-ordered facility-administered medications on file.      Current Outpatient Prescriptions Ordered in Epic   Medication     amLODIPine (NORVASC) 10 MG tablet     Cholecalciferol (VITAMIN D3) 1000 UNITS CAPS     cloNIDine (CATAPRES) 0.1 MG tablet     clopidogrel (PLAVIX) 75 MG tablet     FLUoxetine (PROZAC) 20 MG capsule     lisinopril (PRINIVIL/ZESTRIL) 20 MG tablet     Multiple Vitamin (MULTI-VITAMIN) per tablet     oxyCODONE-acetaminophen (PERCOCET) 5-325 MG per tablet     Pregabalin (LYRICA PO)     QUEtiapine (SEROQUEL) 100 MG tablet     QUEtiapine (SEROQUEL) 25 MG tablet       Wt Readings from Last 1 Encounters:   08/30/18 56.2 kg (124 lb)     Temp Readings from Last 1 Encounters:   09/29/18 36.3  C (97.3  F) (Oral)     BP Readings from Last 6 Encounters:   09/29/18 180/89   08/30/18 112/76   01/26/18 110/70   09/21/17 110/60   07/31/17 102/78   02/13/17 124/70     Pulse Readings from Last 4 Encounters:   09/29/18 74   08/30/18 80   01/26/18 97   09/21/17 76     Resp Readings from Last 1 Encounters:   09/29/18 18     SpO2 Readings from Last 1 Encounters:    09/29/18 98%     Recent Labs   Lab Test  09/29/18   0039  08/30/18   0945   NA  144  141   POTASSIUM  3.6  3.5   CHLORIDE  107  105   CO2  29  27   ANIONGAP  8  9   GLC  93  76   BUN  8  6*   CR  0.60  0.59   RICKEY  9.0  8.7     Recent Labs   Lab Test  09/29/18   0039  08/30/18   0945   07/10/15   2010   AST  28  19   < >  25   ALT  19  15   < >  29   ALKPHOS  148  133   < >  165*   BILITOTAL  0.2  0.3   < >  0.1*   LIPASE  96   --    --   372    < > = values in this interval not displayed.     Recent Labs   Lab Test  09/29/18   0039  08/30/18   0945   WBC  5.1  4.5   HGB  14.4  14.0   PLT  199  213     Recent Labs   Lab Test  10/20/12   2027   ABO  O   RH   Pos     Recent Labs   Lab Test  07/10/14   0525  02/28/14   1403   INR  1.04  0.95   PTT   --   32      Recent Labs   Lab Test  07/10/15   2010  08/05/14   2005  08/05/14   1737   TROPI  <0.015  The 99th percentile for upper reference range is 0.045 ug/L.  Troponin values in   the range of 0.045 - 0.120 ug/L may be associated with risks of adverse   clinical events.    <0.015  The 99th percentile for upper reference range is 0.045 ug/L.  Troponin values in   the range of 0.045 - 0.120 ug/L may be associated with risks of adverse   clinical events.   Effective 7/30/2014, the reference range for this assay has changed to reflect   new instrumentation/methodology.    Unsatisfactory specimen - hemolyzed   Charge credited       Recent Labs   Lab Test  07/10/14   0945   PH  7.49*   PCO2  35   PO2  205*   HCO3  26     No results for input(s): HCG in the last 67849 hours.  Recent Results (from the past 744 hour(s))   CT Abdomen Pelvis w Contrast    Narrative    CT ABDOMEN AND PELVIS WITH CONTRAST  9/29/2018 1:13 AM     HISTORY: Acute abdominal pain, mostly left-sided. Nausea.    COMPARISON: 7/10/2015.    TECHNIQUE: Following the uneventful administration of 62 mL Isovue-370  intravenous contrast, helical sections were acquired from the top of  the diaphragm through the  pubic symphysis. Coronal reconstructions  were generated. Radiation dose for this scan was reduced using  automated exposure control, adjustment of the mA and/or kV according  to the patient's size, or iterative reconstruction technique.    FINDINGS:  Abdomen: The liver, spleen, pancreas, adrenal glands are unremarkable.  Mild focal atrophy of the interpolar region of the right kidney. 0.3  cm nonobstructing calculus in the upper pole of the left kidney. The  gallbladder is not visualized, likely representing prior  cholecystectomy. The common bile duct is at the upper limits of normal  in caliber, measuring 1.0 cm in diameter. Prior gastric bypass  surgery. 3 cm periampullary duodenal diverticulum. Atherosclerotic  calcification in the abdominal aorta.    Scan through the lower chest is significant for coronary artery  calcification.    Pelvis: The small and large bowel are nondilated. The appendix is  unremarkable. There is swirling about the root of the small bowel  mesentery (for example, series 3 image 29). The superior mesenteric  vein becomes very small in caliber at this location. Additionally,  there is prominent mesenteric edema associated with a long segment of  small bowel in the left hemiabdomen (for example, series 3 image 41).  No bowel wall thickening, pneumatosis or free intraperitoneal gas. The  uterus is not visualized. No enlarged lymph nodes or free fluid in the  pelvis. Fusion hardware in the lumbar spine.      Impression    IMPRESSION:  1. Swirling about the root of the small bowel mesentery with secondary  narrowing of the superior mesenteric vein and edema within the  mesentery of a long segment of small bowel in the left hemiabdomen.  These findings could represent a small bowel volvulus. Of note, there  is no associated bowel dilatation or wall thickening. Recommend  correlation with the patient's symptoms.  2. No other cause of acute pain identified in the abdomen or pelvis.    The  findings were called to Dr. Green by Dr. Luis on 9/29/2018  at 0128 hours.       RECENT LABS:   ECG:   ECHO:       Anesthesia Evaluation     .             ROS/MED HX    ENT/Pulmonary:  - neg pulmonary ROS     Neurologic:     (+)migraines, CVA TIA     Cardiovascular:     (+) hypertension----. Taking blood thinners : . . . :. .      (-) CHF, HERRON and arrhythmias   METS/Exercise Tolerance:     Hematologic:         Musculoskeletal:   (+) , , other musculoskeletal- chronic low back pain      GI/Hepatic:     (+) GERD Other GI/Hepatic s/p gastric bypass, ran, appy      Renal/Genitourinary:         Endo:         Psychiatric:     (+) psychiatric history depression      Infectious Disease:         Malignancy:         Other: Comment: insomnia   (+) H/O Chronic Pain,H/O chronic opiod use ,                    Physical Exam  Normal systems: dental    Airway   Mallampati: I  TM distance: >3 FB  Neck ROM: full    Dental     Cardiovascular   Rhythm and rate: regular and normal      Pulmonary    breath sounds clear to auscultation                    Anesthesia Plan      History & Physical Review  History and physical reviewed and following examination; no interval change.    ASA Status:  3 .    NPO Status:  > 8 hours    Plan for General, RSI and ETT with Intravenous and Propofol induction. Maintenance will be Balanced.    PONV prophylaxis:  Ondansetron (or other 5HT-3) and Dexamethasone or Solumedrol  Hydromorphone  precedex gtt      Postoperative Care  Postoperative pain management:  IV analgesics.      Consents  Anesthetic plan, risks, benefits and alternatives discussed with:  Patient..                          .

## 2018-09-29 NOTE — ED NOTES
Bed: ED10  Expected date: 9/29/18  Expected time: 12:15 AM  Means of arrival: Ambulance  Comments:  HEMS 433 48F hernia pain

## 2018-09-29 NOTE — CONSULTS
Consult Note     Joann Gonzalez MRN# [id number]   YOB: 1960 Age: 58 year old      Date of Admission:  9/29/2018    Primary care provider: Esau Ospina          Assessment and Plan:   This is a  58 year old female admitted with with an internal hernia with compression of SMA, s/p laparotomy with adhesiolysis.    1. Laparotomy with adhesiolysis.  Operative cares per general surgery.  She has an NG tube on low intermittent suction in place.  Pain control with a Dilaudid PCA.    2.  Chronic back pain.  Patient is intrathecal pain pump.  Continue Lyrica.    3.  Anxiety.  Continue Prozac and Seroquel.    4.  History of CVA.  Resume Plavix when okay per general surgery.    5.  Hypertension.  Continue lisinopril.  Resume amlodipine.  Discontinue clonidine.    6.  Urinalysis.  Very few white blood cells in the urine.  Patient denies dysuria.  No indications for antibiotics.     # Pain Assessment:  Current Pain Score 9/29/2018   Patient currently in pain? yes   Pain score (0-10) -   Pain location Abdomen   Pain descriptors -   - Joann is experiencing pain due to laparotomy. Pain management was discussed and the plan was created in a collaborative fashion.  Joann's response to the current recommendations: compliant  - Please see the plan for pain management as documented above            Attestation:  This patient has been seen and evaluated by me, Floyd [place] MD.           Chief Complaint:   This patient is a 58 year old female who presents with abdominal pain.    History is obtained from the patient         History of Present Illness:   The patient presented to the emergency room yesterday with abdominal pain.  CT scan was concerning for small bowel volvulus.  The patient was taken emergently to the operating room and underwent laparotomy with adhesio lysis and repair of internal hernia.  She is currently sleeping in her room but complains of uncontrolled pain.  She denies chest pain or  shortness of breath.  She had no stool or flatus.  She has history of a Johnny-en-Y gastric bypass in 2003.             Past Medical History:     Past Medical History:   Diagnosis Date     Depression      Hypertension      Migraine 12/2013     Overdose      Stroke (H)      Unspecified cerebral artery occlusion with cerebral infarction 10/20/2012             Past Surgical History:     Past Surgical History:   Procedure Laterality Date     ABDOMEN SURGERY  2008 - Gallbladder     APPENDECTOMY  1997    done with hysterectomy     BACK SURGERY  2010 x 3 - Fusion     BACK SURGERY       CHOLECYSTECTOMY       ESOPHAGOSCOPY, GASTROSCOPY, DUODENOSCOPY (EGD), COMBINED N/A 7/11/2015    Procedure: COMBINED ESOPHAGOSCOPY, GASTROSCOPY, DUODENOSCOPY (EGD);  Surgeon: Sree Scanlon MD;  Location:  GI     GASTRIC BYPASS  2003     GASTRIC BYPASS       GI SURGERY       HYSTERECTOMY  1997     HYSTERECTOMY, PAP NO LONGER INDICATED  1997    hysterectomy             Social History:     Social History   Substance Use Topics     Smoking status: Former Smoker     Packs/day: 1.00     Years: 15.00     Start date: 1/1/2018     Smokeless tobacco: Never Used     Alcohol use No      Comment: 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             Family History:     Family History   Problem Relation Age of Onset     Alzheimer Disease Mother      Hypertension Mother      Endocrine Disease Father              Immunizations:     Immunization History   Administered Date(s) Administered     Influenza (IIV3) PF 10/10/2012, 09/26/2013, 09/09/2014     Influenza Vaccine IM 3yrs+ 4 Valent IIV4 11/03/2016, 10/19/2017, 08/30/2018     Pneumo Conj 13-V (2010&after) 09/14/2015     TDAP Vaccine (Adacel) 06/21/2013, 04/05/2015     Tdap (Adacel,Boostrix) 03/10/2003            Allergies:     Allergies   Allergen Reactions     Advil Pm [Ibuprofen-Diphenhydramine Cit] Other (See Comments)     Or any ibuprophen  Reports red face     Aspirin Unknown      Nsaids      Plaquenil [Hydroxychloroquine] Rash             Medications:     Prior to Admission medications    Medication Sig Start Date End Date Taking? Authorizing Provider   amLODIPine (NORVASC) 10 MG tablet Take 1 tablet (10 mg) by mouth daily 8/30/18  Yes Esau Ospina MD   clopidogrel (PLAVIX) 75 MG tablet TAKE 1 TABLET(75 MG) BY MOUTH DAILY 3/13/18  Yes Esau Ospina MD   FLUoxetine (PROZAC) 20 MG capsule TAKE 2 CAPSULES BY MOUTH EVERY DAY 8/30/18  Yes Esau Ospina MD   lisinopril (PRINIVIL/ZESTRIL) 20 MG tablet Take 1 tablet (20 mg) by mouth daily 8/30/18  Yes Esau Ospina MD   Multiple Vitamin (MULTI-VITAMIN) per tablet Take 1 tablet by mouth daily.   Yes Reported, Patient   oxyCODONE-acetaminophen (PERCOCET) 5-325 MG per tablet Take 1 tablet by mouth daily as needed for severe pain   Yes Unknown, Entered By History   QUEtiapine (SEROQUEL) 100 MG tablet TAKE 1 TABLET(100 MG) BY MOUTH AT BEDTIME 2/22/18  Yes Esau Ospina MD   QUEtiapine (SEROQUEL) 25 MG tablet TAKE 1/2 TABLET BY MOUTH TWICE DAILY AS DIRECTED 8/29/18  Yes Esau Ospina MD            Review of Systems:   The 10 point Review of Systems is negative other than noted in the HPI           Physical Exam:   Vitals were reviewed  Temp: 98.4  F (36.9  C) Temp src: Axillary BP: 168/89 Pulse: 63 Heart Rate: 70 Resp: 12 SpO2: 97 % O2 Device: Nasal cannula Oxygen Delivery: 2 LPM    This is a well nourished well developed female resting comfortably in bed, no acute distress  Head: atraumatic normocephalic, sclera noninjected and anicterric, oral mucosa moist without lesions or exudates.  Neck: supple without spinal abnormality  Chest: clear to auscultation bilaterally, without wheezes, rhonchi, or rales.  Cardiovascular: regular rate and rhythm, no murmurs, gallops, or rubs, no edema  Abdomen: bowel sounds minimal, nontender and nondistended, no hepatosplenomegaly or masses.  Musculoskeletal: normal muscle mass and tone  Skin: no  rashes  Lymph: no lymphadenopathy.  Neuro: cranial nerves II-XII intact, strength in all four extremities normal, reflexes normal, coordination normal.         Data:   All laboratory data reviewed  All cardiac studies reviewed by me.  All imaging studies reviewed by me.

## 2018-09-30 LAB
ANION GAP SERPL CALCULATED.3IONS-SCNC: 6 MMOL/L (ref 3–14)
BASOPHILS # BLD AUTO: 0 10E9/L (ref 0–0.2)
BASOPHILS NFR BLD AUTO: 0.2 %
BUN SERPL-MCNC: 6 MG/DL (ref 7–30)
CALCIUM SERPL-MCNC: 7.8 MG/DL (ref 8.5–10.1)
CHLORIDE SERPL-SCNC: 110 MMOL/L (ref 94–109)
CO2 SERPL-SCNC: 27 MMOL/L (ref 20–32)
CREAT SERPL-MCNC: 0.54 MG/DL (ref 0.52–1.04)
DIFFERENTIAL METHOD BLD: ABNORMAL
EOSINOPHIL # BLD AUTO: 0 10E9/L (ref 0–0.7)
EOSINOPHIL NFR BLD AUTO: 0.3 %
ERYTHROCYTE [DISTWIDTH] IN BLOOD BY AUTOMATED COUNT: 14.2 % (ref 10–15)
GFR SERPL CREATININE-BSD FRML MDRD: >90 ML/MIN/1.7M2
GLUCOSE SERPL-MCNC: 135 MG/DL (ref 70–99)
HCT VFR BLD AUTO: 27.8 % (ref 35–47)
HGB BLD-MCNC: 9.3 G/DL (ref 11.7–15.7)
IMM GRANULOCYTES # BLD: 0 10E9/L (ref 0–0.4)
IMM GRANULOCYTES NFR BLD: 0.2 %
LYMPHOCYTES # BLD AUTO: 1.5 10E9/L (ref 0.8–5.3)
LYMPHOCYTES NFR BLD AUTO: 23.2 %
MCH RBC QN AUTO: 30.6 PG (ref 26.5–33)
MCHC RBC AUTO-ENTMCNC: 33.5 G/DL (ref 31.5–36.5)
MCV RBC AUTO: 91 FL (ref 78–100)
MONOCYTES # BLD AUTO: 0.5 10E9/L (ref 0–1.3)
MONOCYTES NFR BLD AUTO: 7.7 %
NEUTROPHILS # BLD AUTO: 4.5 10E9/L (ref 1.6–8.3)
NEUTROPHILS NFR BLD AUTO: 68.4 %
NRBC # BLD AUTO: 0 10*3/UL
NRBC BLD AUTO-RTO: 0 /100
PLATELET # BLD AUTO: 155 10E9/L (ref 150–450)
POTASSIUM SERPL-SCNC: 3.6 MMOL/L (ref 3.4–5.3)
RBC # BLD AUTO: 3.04 10E12/L (ref 3.8–5.2)
SODIUM SERPL-SCNC: 143 MMOL/L (ref 133–144)
WBC # BLD AUTO: 6.5 10E9/L (ref 4–11)

## 2018-09-30 PROCEDURE — 25800025 ZZH RX 258: Performed by: SURGERY

## 2018-09-30 PROCEDURE — 40000884 ZZH STATISTIC STEP DOWN HRS NIGHT

## 2018-09-30 PROCEDURE — 25000128 H RX IP 250 OP 636: Performed by: SURGERY

## 2018-09-30 PROCEDURE — 40000885 ZZH STATISTIC STEP DOWN HRS EVENING

## 2018-09-30 PROCEDURE — A9270 NON-COVERED ITEM OR SERVICE: HCPCS | Mod: GY | Performed by: STUDENT IN AN ORGANIZED HEALTH CARE EDUCATION/TRAINING PROGRAM

## 2018-09-30 PROCEDURE — 25000132 ZZH RX MED GY IP 250 OP 250 PS 637: Mod: GY | Performed by: SURGERY

## 2018-09-30 PROCEDURE — 85025 COMPLETE CBC W/AUTO DIFF WBC: CPT | Performed by: SURGERY

## 2018-09-30 PROCEDURE — 12000007 ZZH R&B INTERMEDIATE

## 2018-09-30 PROCEDURE — 40000886 ZZH STATISTIC STEP DOWN HRS DAY

## 2018-09-30 PROCEDURE — 36415 COLL VENOUS BLD VENIPUNCTURE: CPT | Performed by: SURGERY

## 2018-09-30 PROCEDURE — A9270 NON-COVERED ITEM OR SERVICE: HCPCS | Mod: GY | Performed by: SURGERY

## 2018-09-30 PROCEDURE — 80048 BASIC METABOLIC PNL TOTAL CA: CPT | Performed by: SURGERY

## 2018-09-30 PROCEDURE — 99232 SBSQ HOSP IP/OBS MODERATE 35: CPT | Performed by: INTERNAL MEDICINE

## 2018-09-30 PROCEDURE — 25000132 ZZH RX MED GY IP 250 OP 250 PS 637: Mod: GY | Performed by: STUDENT IN AN ORGANIZED HEALTH CARE EDUCATION/TRAINING PROGRAM

## 2018-09-30 RX ORDER — ACETAMINOPHEN 325 MG/1
650 TABLET ORAL EVERY 6 HOURS
Status: DISCONTINUED | OUTPATIENT
Start: 2018-09-30 | End: 2018-10-02 | Stop reason: HOSPADM

## 2018-09-30 RX ORDER — HEPARIN SODIUM 5000 [USP'U]/.5ML
5000 INJECTION, SOLUTION INTRAVENOUS; SUBCUTANEOUS EVERY 12 HOURS
Status: DISCONTINUED | OUTPATIENT
Start: 2018-09-30 | End: 2018-10-01

## 2018-09-30 RX ADMIN — PREGABALIN 50 MG: 50 CAPSULE ORAL at 09:34

## 2018-09-30 RX ADMIN — PREGABALIN 50 MG: 50 CAPSULE ORAL at 21:05

## 2018-09-30 RX ADMIN — ACETAMINOPHEN 650 MG: 325 TABLET, FILM COATED ORAL at 15:20

## 2018-09-30 RX ADMIN — HEPARIN SODIUM 5000 UNITS: 5000 INJECTION, SOLUTION INTRAVENOUS; SUBCUTANEOUS at 12:48

## 2018-09-30 RX ADMIN — POTASSIUM CHLORIDE, DEXTROSE MONOHYDRATE AND SODIUM CHLORIDE: 150; 5; 450 INJECTION, SOLUTION INTRAVENOUS at 07:35

## 2018-09-30 RX ADMIN — Medication: at 07:18

## 2018-09-30 RX ADMIN — HEPARIN SODIUM 5000 UNITS: 5000 INJECTION, SOLUTION INTRAVENOUS; SUBCUTANEOUS at 21:15

## 2018-09-30 RX ADMIN — ACETAMINOPHEN 650 MG: 325 TABLET, FILM COATED ORAL at 21:05

## 2018-09-30 RX ADMIN — POTASSIUM CHLORIDE, DEXTROSE MONOHYDRATE AND SODIUM CHLORIDE: 150; 5; 450 INJECTION, SOLUTION INTRAVENOUS at 17:11

## 2018-09-30 RX ADMIN — FLUOXETINE 40 MG: 20 CAPSULE ORAL at 09:34

## 2018-09-30 ASSESSMENT — ACTIVITIES OF DAILY LIVING (ADL)
ADLS_ACUITY_SCORE: 8
ADLS_ACUITY_SCORE: 9
ADLS_ACUITY_SCORE: 8
ADLS_ACUITY_SCORE: 9

## 2018-09-30 NOTE — PROGRESS NOTES
[facility]    Hospitalist Progress Note    Date of Service (when I saw the patient): 09/30/2018    Assessment & Plan   Joann Gonzalez is a 58 year old female who was admitted on 9/29/2018   with an internal hernia with compression of SMA, s/p laparotomy with adhesiolysis.     1. Laparotomy with adhesiolysis.  Operative cares per general surgery.  She has an NG tube on low intermittent suction in place.  Pain control with a Dilaudid PCA.  No stool or flatus.     2.  Chronic back pain.  Patient is intrathecal pain pump.  Continue Lyrica.     3.  Anxiety.  Continue Prozac and Seroquel.     4.  History of CVA.  Resume Plavix when okay per general surgery.     5.  Hypertension.  Hypotensive yesterday, but responded to fluid boluses.  Lisinopril and amlodipine are discontinued.       6.  Urinalysis.  Very few white blood cells in the urine.  Patient denies dysuria.  No indications for antibiotics.    7. Acute blood loss anemia.  Hemoglobin dropped from 14.4 -> 10 -> 9.3 (stable overnight).  Monitor.          # Pain Assessment:  Current Pain Score 9/30/2018   Patient currently in pain? yes   Pain score (0-10) 7   Pain location Abdomen   Pain descriptors -   Pain control per primary service.      DVT Prophylaxis: Defer to primary service  Code Status: Full Code    Disposition: Expected discharge in several days, pending return of bowel function.    [place]  Text Page (7 am to 6 pm)    Interval History   The patient is resting in bed.  No stool or flatus.  Pain control is adequate.    -Data reviewed today: I reviewed all new labs and imaging results over the last 24 hours. I personally reviewed no images or EKG's today.    Physical Exam   Temp: 99.8  F (37.7  C) Temp src: Oral BP: 127/59 Pulse: 68 Heart Rate: 87 Resp: 22 SpO2: 94 % O2 Device: None (Room air) Oxygen Delivery: 2.5 LPM  There were no vitals filed for this visit.  Vital Signs with Ranges  Temp:  [97.7  F (36.5  C)-99.8  F (37.7  C)]  99.8  F (37.7  C)  Pulse:  [68-78] 68  Heart Rate:  [61-87] 87  Resp:  [15-28] 22  BP: ()/(39-89) 127/59  SpO2:  [92 %-100 %] 94 %  I/O last 3 completed shifts:  In: 2025 [I.V.:1025; IV Piggyback:1000]  Out: 1905 [Urine:1900; Emesis/NG output:5]    Gen: Well nourished, well developed, alert and oriented x 3, no acute distressed  HEENT: Atraumatic, normocephalic  Lungs: Clear to ausculation without wheezes, rhonchi, or rales  Heart: Regular rate and rhythm, no murmurs, gallops, or rubs  GI: Bowel sound normal, no hepatosplenomegaly or masses  Lymph: No lymphadenopathy or edema  Skin: No rashes     Medications     dextrose 5% and 0.45% NaCl + KCl 20 mEq/L 100 mL/hr at 09/30/18 0735     HYDROmorphone         FLUoxetine  40 mg Oral Daily     pregabalin (LYRICA) capsule 50 mg  50 mg Oral BID     QUEtiapine  12.5 mg Oral BID     QUEtiapine  50 mg Oral At Bedtime     sodium chloride (PF)  3 mL Intracatheter Q8H       Data     Recent Labs  Lab 09/30/18  0745 09/29/18  1960/18  1421 09/29/18  0039   WBC 6.5  --  6.9 5.1   HGB 9.3* 9.3* 10.0* 14.4   MCV 91  --  90 89     --  178 199     --  142 144   POTASSIUM 3.6  --  4.1 3.6   CHLORIDE 110*  --  110* 107   CO2 27  --  28 29   BUN 6*  --  7 8   CR 0.54  --  0.78 0.60   ANIONGAP 6  --  4 8   RICKEY 7.8*  --  7.3* 9.0   *  --  166* 93   ALBUMIN  --   --   --  3.9   PROTTOTAL  --   --   --  7.5   BILITOTAL  --   --   --  0.2   ALKPHOS  --   --   --  148   ALT  --   --   --  19   AST  --   --   --  28   LIPASE  --   --   --  96       No results found for this or any previous visit (from the past 24 hour(s)).

## 2018-09-30 NOTE — PROGRESS NOTES
SURGERY PROGRESS NOTE    S:  Bolused multiple times yesterday for hypotension.  Still having pain but control is better.  No nausea or vomiting.    O:  /59  Pulse 68  Temp 99.8  F (37.7  C) (Oral)  Resp 22  SpO2 94%  Breastfeeding? No  I/O: negligible NGT output.  UOP sluggish overnight but has since picked up.    General: A&O, NAD.  Chest:  Breathing unlabored, chest rise symmetrical.  Abdomen:  Dressing c/d/i.  There is light ecchymosis and erythema over the right lateral incision.  Tenderness as expected post-op.  Nondistended.    Labs reviewed.  WBC 6.5    A:  Ms. Gonzalez is a 58-year-old on POD#1 s/p laparoscopic reduction and repair of internal hernia.  Hypotensive last night but since significantly improved.  No return of bowel function yet, as expected.    R:  1.  Remove NGT.  2.  Continue NPO and await return of bowel function.  3.  Remove Ruff.    Jules Estrada MD  General surgery resident  [phone]

## 2018-09-30 NOTE — PROGRESS NOTES
Resting comfortably. SBP 93. Pulse in 60's. Little out ng. Hgb 9.3. I think she is recovering well from exploration. Following.

## 2018-09-30 NOTE — PLAN OF CARE
Problem: Surgery Nonspecified (Adult)  Goal: Signs and Symptoms of Listed Potential Problems Will be Absent, Minimized or Managed (Surgery Nonspecified)  Signs and symptoms of listed potential problems will be absent, minimized or managed by discharge/transition of care (reference Surgery Nonspecified (Adult) CPG).   Outcome: No Change  A/OX4, however, lethargic. Tele: SR.Hypotensive, asymptomatic. Albumin given with slight improvement of BP. Heriberto Seroquel held. Capno in place. Abd lap sites w/ bandaids, CDI. BS hypo/faint, no flatus.NGT to LIS, scant output. PCA dilaudid for pain. Ruff cath patent, urine output at borderline. Dangled.

## 2018-09-30 NOTE — PLAN OF CARE
Problem: Patient Care Overview  Goal: Plan of Care/Patient Progress Review  Outcome: Improving  A&OX4, tele SR , Tmax 99.8 AX, Soft BP,asymptomatic, on RA sat in the 90's. Encouraged use of IS. Abd  Incision bandaid CDI. Hypoactive bowel sound. NGT-LIS in place with scant green-brown drainage. MD saw pt this am,directed NGT to gravity and plan for possible discontinuation today. No flatus yet. Abd pain managed w/ PCA dilaudid. Good UOP via dubois cath. Denies N/V. Will continue to monitor.

## 2018-10-01 LAB
ANION GAP SERPL CALCULATED.3IONS-SCNC: 4 MMOL/L (ref 3–14)
BASOPHILS # BLD AUTO: 0 10E9/L (ref 0–0.2)
BASOPHILS NFR BLD AUTO: 0.3 %
BUN SERPL-MCNC: 3 MG/DL (ref 7–30)
CALCIUM SERPL-MCNC: 8.3 MG/DL (ref 8.5–10.1)
CHLORIDE SERPL-SCNC: 108 MMOL/L (ref 94–109)
CO2 SERPL-SCNC: 31 MMOL/L (ref 20–32)
CREAT SERPL-MCNC: 0.45 MG/DL (ref 0.52–1.04)
DIFFERENTIAL METHOD BLD: ABNORMAL
EOSINOPHIL # BLD AUTO: 0.1 10E9/L (ref 0–0.7)
EOSINOPHIL NFR BLD AUTO: 1 %
ERYTHROCYTE [DISTWIDTH] IN BLOOD BY AUTOMATED COUNT: 14.2 % (ref 10–15)
GFR SERPL CREATININE-BSD FRML MDRD: >90 ML/MIN/1.7M2
GLUCOSE SERPL-MCNC: 109 MG/DL (ref 70–99)
HCT VFR BLD AUTO: 30.3 % (ref 35–47)
HGB BLD-MCNC: 10.1 G/DL (ref 11.7–15.7)
IMM GRANULOCYTES # BLD: 0 10E9/L (ref 0–0.4)
IMM GRANULOCYTES NFR BLD: 0.2 %
LYMPHOCYTES # BLD AUTO: 2 10E9/L (ref 0.8–5.3)
LYMPHOCYTES NFR BLD AUTO: 33.7 %
MAGNESIUM SERPL-MCNC: 1.8 MG/DL (ref 1.6–2.3)
MCH RBC QN AUTO: 30.5 PG (ref 26.5–33)
MCHC RBC AUTO-ENTMCNC: 33.3 G/DL (ref 31.5–36.5)
MCV RBC AUTO: 92 FL (ref 78–100)
MONOCYTES # BLD AUTO: 0.4 10E9/L (ref 0–1.3)
MONOCYTES NFR BLD AUTO: 6.2 %
NEUTROPHILS # BLD AUTO: 3.5 10E9/L (ref 1.6–8.3)
NEUTROPHILS NFR BLD AUTO: 58.6 %
NRBC # BLD AUTO: 0 10*3/UL
NRBC BLD AUTO-RTO: 0 /100
PHOSPHATE SERPL-MCNC: 2.6 MG/DL (ref 2.5–4.5)
PLATELET # BLD AUTO: 174 10E9/L (ref 150–450)
PLATELET # BLD AUTO: NORMAL 10E9/L (ref 150–450)
POTASSIUM SERPL-SCNC: 3.6 MMOL/L (ref 3.4–5.3)
RBC # BLD AUTO: 3.31 10E12/L (ref 3.8–5.2)
SODIUM SERPL-SCNC: 143 MMOL/L (ref 133–144)
WBC # BLD AUTO: 6 10E9/L (ref 4–11)

## 2018-10-01 PROCEDURE — 25000128 H RX IP 250 OP 636: Performed by: SURGERY

## 2018-10-01 PROCEDURE — 25000132 ZZH RX MED GY IP 250 OP 250 PS 637: Mod: GY | Performed by: SURGERY

## 2018-10-01 PROCEDURE — 84100 ASSAY OF PHOSPHORUS: CPT | Performed by: SURGERY

## 2018-10-01 PROCEDURE — 25000132 ZZH RX MED GY IP 250 OP 250 PS 637: Mod: GY | Performed by: STUDENT IN AN ORGANIZED HEALTH CARE EDUCATION/TRAINING PROGRAM

## 2018-10-01 PROCEDURE — 85025 COMPLETE CBC W/AUTO DIFF WBC: CPT | Performed by: SURGERY

## 2018-10-01 PROCEDURE — 99232 SBSQ HOSP IP/OBS MODERATE 35: CPT | Performed by: INTERNAL MEDICINE

## 2018-10-01 PROCEDURE — 80048 BASIC METABOLIC PNL TOTAL CA: CPT | Performed by: SURGERY

## 2018-10-01 PROCEDURE — 36415 COLL VENOUS BLD VENIPUNCTURE: CPT | Performed by: SURGERY

## 2018-10-01 PROCEDURE — 12000007 ZZH R&B INTERMEDIATE

## 2018-10-01 PROCEDURE — 83735 ASSAY OF MAGNESIUM: CPT | Performed by: SURGERY

## 2018-10-01 PROCEDURE — A9270 NON-COVERED ITEM OR SERVICE: HCPCS | Mod: GY | Performed by: STUDENT IN AN ORGANIZED HEALTH CARE EDUCATION/TRAINING PROGRAM

## 2018-10-01 PROCEDURE — A9270 NON-COVERED ITEM OR SERVICE: HCPCS | Mod: GY | Performed by: SURGERY

## 2018-10-01 RX ORDER — OXYCODONE HYDROCHLORIDE 5 MG/1
5-10 TABLET ORAL
Status: DISCONTINUED | OUTPATIENT
Start: 2018-10-01 | End: 2018-10-02 | Stop reason: HOSPADM

## 2018-10-01 RX ORDER — QUETIAPINE FUMARATE 25 MG/1
100 TABLET, FILM COATED ORAL AT BEDTIME
Status: DISCONTINUED | OUTPATIENT
Start: 2018-10-01 | End: 2018-10-02 | Stop reason: HOSPADM

## 2018-10-01 RX ORDER — BISACODYL 10 MG
10 SUPPOSITORY, RECTAL RECTAL ONCE
Status: COMPLETED | OUTPATIENT
Start: 2018-10-01 | End: 2018-10-01

## 2018-10-01 RX ORDER — POLYETHYLENE GLYCOL 3350 17 G/17G
17 POWDER, FOR SOLUTION ORAL 2 TIMES DAILY
Status: DISCONTINUED | OUTPATIENT
Start: 2018-10-01 | End: 2018-10-02 | Stop reason: HOSPADM

## 2018-10-01 RX ORDER — CLOPIDOGREL BISULFATE 75 MG/1
75 TABLET ORAL DAILY
Status: DISCONTINUED | OUTPATIENT
Start: 2018-10-01 | End: 2018-10-02 | Stop reason: HOSPADM

## 2018-10-01 RX ADMIN — OXYCODONE HYDROCHLORIDE 10 MG: 5 TABLET ORAL at 20:43

## 2018-10-01 RX ADMIN — OXYCODONE HYDROCHLORIDE 10 MG: 5 TABLET ORAL at 17:34

## 2018-10-01 RX ADMIN — Medication: at 06:59

## 2018-10-01 RX ADMIN — CLOPIDOGREL 75 MG: 75 TABLET, FILM COATED ORAL at 14:14

## 2018-10-01 RX ADMIN — Medication 12.5 MG: at 20:44

## 2018-10-01 RX ADMIN — FLUOXETINE 40 MG: 20 CAPSULE ORAL at 09:00

## 2018-10-01 RX ADMIN — ACETAMINOPHEN 650 MG: 325 TABLET, FILM COATED ORAL at 14:47

## 2018-10-01 RX ADMIN — ACETAMINOPHEN 650 MG: 325 TABLET, FILM COATED ORAL at 09:00

## 2018-10-01 RX ADMIN — OXYCODONE HYDROCHLORIDE 10 MG: 5 TABLET ORAL at 14:44

## 2018-10-01 RX ADMIN — BISACODYL 10 MG: 10 SUPPOSITORY RECTAL at 17:34

## 2018-10-01 RX ADMIN — PREGABALIN 50 MG: 50 CAPSULE ORAL at 09:01

## 2018-10-01 RX ADMIN — HEPARIN SODIUM 5000 UNITS: 5000 INJECTION, SOLUTION INTRAVENOUS; SUBCUTANEOUS at 11:08

## 2018-10-01 RX ADMIN — PREGABALIN 50 MG: 50 CAPSULE ORAL at 20:43

## 2018-10-01 RX ADMIN — Medication 12.5 MG: at 09:01

## 2018-10-01 RX ADMIN — ACETAMINOPHEN 650 MG: 325 TABLET, FILM COATED ORAL at 20:43

## 2018-10-01 RX ADMIN — QUETIAPINE FUMARATE 100 MG: 25 TABLET ORAL at 21:27

## 2018-10-01 RX ADMIN — POLYETHYLENE GLYCOL 3350 17 G: 17 POWDER, FOR SOLUTION ORAL at 20:43

## 2018-10-01 RX ADMIN — OXYCODONE HYDROCHLORIDE 10 MG: 5 TABLET ORAL at 11:16

## 2018-10-01 RX ADMIN — ACETAMINOPHEN 650 MG: 325 TABLET, FILM COATED ORAL at 02:47

## 2018-10-01 ASSESSMENT — ACTIVITIES OF DAILY LIVING (ADL)
ADLS_ACUITY_SCORE: 8

## 2018-10-01 ASSESSMENT — PAIN DESCRIPTION - DESCRIPTORS: DESCRIPTORS: ACHING

## 2018-10-01 NOTE — PROGRESS NOTES
SURGERY PROGRESS NOTE    S:  Pain improving.  Passing flatus.  Denies n/v.     O:  /75  Pulse 68  Temp 97.7  F (36.5  C) (Oral)  Resp 18  SpO2 96%  Breastfeeding? No  General: A&O, NAD.  Chest:  Breathing unlabored, chest rise symmetrical.  Abdomen:  Incisions have some faint surrounding erythema and ecchymosis, stable.  Mild tenderness near incisions.  No rebound or guarding.  Nondistended.    WBC 6.0    A:  POD#2 s/p lap reduction and repair of internal hernia.  Has evidence of return of bowel function now.    R:  1.  Regular diet.    Jules Estrada MD  General surgery resident  [phone]

## 2018-10-01 NOTE — PLAN OF CARE
Problem: Patient Care Overview  Goal: Individualization & Mutuality  3547  Oxycodone for pain control. Tolerating regular diet in small amts. Passing flatus, no bm postop. Rhonda Riley RN

## 2018-10-01 NOTE — PLAN OF CARE
"Problem: Patient Care Overview  Goal: Plan of Care/Patient Progress Review  Outcome: No Change  A&O x4, tele SR, VSS except elevated BP at times, abd incision ecchymotic sites improving, bandaid CDI,   Abd lap sites ecchymotic w/ bandaids, CDI. Bowel sound hypoactive, reported passing some \"gas\". Pain decreased with PCA dilaudid & scheduled tylenol. Denies N/V. Ambulates to bathroom with one assist. Voids large UOP. Will monitor.       "

## 2018-10-01 NOTE — PLAN OF CARE
Problem: Surgery Nonspecified (Adult)  Goal: Signs and Symptoms of Listed Potential Problems Will be Absent, Minimized or Managed (Surgery Nonspecified)  Signs and symptoms of listed potential problems will be absent, minimized or managed by discharge/transition of care (reference Surgery Nonspecified (Adult) CPG).   Outcome: Therapy, progress toward functional goals as expected  A/OX4, more awake than yesterday,scheduled Seroquel held. Tele: NSR. Tmax 100.2.BP trending up to hypertensive. Abd lap sites ecchymotic w/ bandaids, CDI. BS hypo, some flatus, mike clears well.NGT d/c'd this AM. PCA dilaudid for pain. Ruff cath d/c'd, voiding a lot. MD contacted and decreased IVF from 100 to 50ml/hr.

## 2018-10-01 NOTE — PROGRESS NOTES
[facility]    Hospitalist Progress Note    Date of Service (when I saw the patient): 10/01/2018    Assessment & Plan   Joann Gonzalez is a 58 year old female who was admitted on 9/29/2018   with an internal hernia with compression of SMA, s/p laparotomy with adhesiolysis.     1. Laparotomy with adhesiolysis.  Operative cares per general surgery.  NG tube removed and advanced to regular diet this AM, but taking only a few bites of breakfast.  Pain control with Dilaudid, oxycodone, and acetaminophen.  No stool, but passing flatus.     2.  Chronic back pain.  Patient is intrathecal pain pump.  Continue Lyrica.     3.  Anxiety.  Continue Prozac and Seroquel.     4.  History of CVA.  Resume Plavix when okay per general surgery.     5.  Hypertension.  Hypotensive initially, but responded to fluid boluses.  BP improved.  Lisinopril and amlodipine are on hold.       6.  Urinalysis.  Very few white blood cells in the urine.  Patient denies dysuria.  No indications for antibiotics.    7. Acute blood loss anemia.  Hemoglobin dropped from 14.4 -> 10 -> 9.3 ->10.1.  Monitor.          # Pain Assessment:  Current Pain Score 10/1/2018   Patient currently in pain? yes   Pain score (0-10) 5   Pain location -   Pain descriptors -   Pain control per primary service.      DVT Prophylaxis: Defer to primary service  Code Status: Full Code    Disposition: Expected discharge in several days, pending return of bowel function.    [place]  Text Page (7 am to 6 pm)    Interval History   The patient is sitting in a chair.  Feels better.  Passing flatus.  Tolerating only a few bites of breakfast, but enjoying Pepsi.    -Data reviewed today: I reviewed all new labs and imaging results over the last 24 hours. I personally reviewed no images or EKG's today.    Physical Exam   Temp: 97.7  F (36.5  C) Temp src: Oral BP: 161/75   Heart Rate: 66 Resp: 18 SpO2: 96 % O2 Device: None (Room air)    There were no vitals filed for  this visit.  Vital Signs with Ranges  Temp:  [97.7  F (36.5  C)-100.2  F (37.9  C)] 97.7  F (36.5  C)  Heart Rate:  [60-90] 66  Resp:  [15-20] 18  BP: (108-161)/(59-78) 161/75  SpO2:  [91 %-99 %] 96 %  I/O last 3 completed shifts:  In: 520 [P.O.:120; I.V.:400]  Out: 3275 [Urine:3275]    Gen: Well nourished, well developed, alert and oriented x 3, no acute distressed  HEENT: Atraumatic, normocephalic  Lungs: Clear to ausculation without wheezes, rhonchi, or rales  Heart: Regular rate and rhythm, no murmurs, gallops, or rubs  GI: Bowel sound normal, no hepatosplenomegaly or masses  Lymph: No lymphadenopathy or edema  Skin: No rashes     Medications       acetaminophen  650 mg Oral Q6H     FLUoxetine  40 mg Oral Daily     heparin  5,000 Units Subcutaneous Q12H     pregabalin (LYRICA) capsule 50 mg  50 mg Oral BID     QUEtiapine  12.5 mg Oral BID     QUEtiapine  50 mg Oral At Bedtime     sodium chloride (PF)  3 mL Intracatheter Q8H       Data     Recent Labs  Lab 10/01/18  0525 09/30/18  0745 09/29/18  1960/18  1421 09/29/18  0039   WBC PENDING 6.5  --  6.9 5.1   HGB PENDING 9.3* 9.3* 10.0* 14.4   MCV PENDING 91  --  90 89   PLT Canceled, Test credited  174 155  --  178 199   NA  --  143  --  142 144   POTASSIUM  --  3.6  --  4.1 3.6   CHLORIDE  --  110*  --  110* 107   CO2  --  27  --  28 29   BUN  --  6*  --  7 8   CR  --  0.54  --  0.78 0.60   ANIONGAP  --  6  --  4 8   RICKEY  --  7.8*  --  7.3* 9.0   GLC  --  135*  --  166* 93   ALBUMIN  --   --   --   --  3.9   PROTTOTAL  --   --   --   --  7.5   BILITOTAL  --   --   --   --  0.2   ALKPHOS  --   --   --   --  148   ALT  --   --   --   --  19   AST  --   --   --   --  28   LIPASE  --   --   --   --  96       No results found for this or any previous visit (from the past 24 hour(s)).

## 2018-10-02 VITALS
SYSTOLIC BLOOD PRESSURE: 136 MMHG | RESPIRATION RATE: 16 BRPM | TEMPERATURE: 97.9 F | DIASTOLIC BLOOD PRESSURE: 61 MMHG | OXYGEN SATURATION: 97 % | HEART RATE: 61 BPM

## 2018-10-02 PROCEDURE — 99239 HOSP IP/OBS DSCHRG MGMT >30: CPT | Performed by: HOSPITALIST

## 2018-10-02 PROCEDURE — 25000132 ZZH RX MED GY IP 250 OP 250 PS 637: Mod: GY | Performed by: SURGERY

## 2018-10-02 PROCEDURE — A9270 NON-COVERED ITEM OR SERVICE: HCPCS | Mod: GY | Performed by: SURGERY

## 2018-10-02 PROCEDURE — 25000132 ZZH RX MED GY IP 250 OP 250 PS 637: Mod: GY | Performed by: STUDENT IN AN ORGANIZED HEALTH CARE EDUCATION/TRAINING PROGRAM

## 2018-10-02 PROCEDURE — A9270 NON-COVERED ITEM OR SERVICE: HCPCS | Mod: GY | Performed by: STUDENT IN AN ORGANIZED HEALTH CARE EDUCATION/TRAINING PROGRAM

## 2018-10-02 RX ORDER — OXYCODONE HYDROCHLORIDE 5 MG/1
5-10 TABLET ORAL
Qty: 30 TABLET | Refills: 0 | Status: SHIPPED | OUTPATIENT
Start: 2018-10-02 | End: 2018-10-08

## 2018-10-02 RX ORDER — POLYETHYLENE GLYCOL 3350 17 G/17G
17 POWDER, FOR SOLUTION ORAL 2 TIMES DAILY
Qty: 21 PACKET | Refills: 1 | Status: SHIPPED | OUTPATIENT
Start: 2018-10-02

## 2018-10-02 RX ADMIN — OXYCODONE HYDROCHLORIDE 10 MG: 5 TABLET ORAL at 11:09

## 2018-10-02 RX ADMIN — ACETAMINOPHEN 650 MG: 325 TABLET, FILM COATED ORAL at 03:36

## 2018-10-02 RX ADMIN — CLOPIDOGREL 75 MG: 75 TABLET, FILM COATED ORAL at 09:13

## 2018-10-02 RX ADMIN — OXYCODONE HYDROCHLORIDE 10 MG: 5 TABLET ORAL at 04:24

## 2018-10-02 RX ADMIN — OXYCODONE HYDROCHLORIDE 10 MG: 5 TABLET ORAL at 01:00

## 2018-10-02 RX ADMIN — FLUOXETINE 40 MG: 20 CAPSULE ORAL at 09:13

## 2018-10-02 RX ADMIN — PREGABALIN 50 MG: 50 CAPSULE ORAL at 09:13

## 2018-10-02 RX ADMIN — OXYCODONE HYDROCHLORIDE 10 MG: 5 TABLET ORAL at 07:36

## 2018-10-02 RX ADMIN — Medication 12.5 MG: at 09:13

## 2018-10-02 RX ADMIN — POLYETHYLENE GLYCOL 3350 17 G: 17 POWDER, FOR SOLUTION ORAL at 09:14

## 2018-10-02 RX ADMIN — ACETAMINOPHEN 650 MG: 325 TABLET, FILM COATED ORAL at 09:13

## 2018-10-02 ASSESSMENT — ACTIVITIES OF DAILY LIVING (ADL)
ADLS_ACUITY_SCORE: 8

## 2018-10-02 NOTE — PLAN OF CARE
Problem: Patient Care Overview  Goal: Plan of Care/Patient Progress Review  Outcome: Improving  A&OX4, VSS on RA except for elevated BP, could resume PTA BP meds tomorrow. POD 2, C/o pain in the abdominal incision sites. Dressings on the incision sites c/d/i , some brusing noted around the  LLQ. Received a total of 20 mg of Oxycodone with good pain control. Passing flatus, BM has returned. Up with SBA. IV saline locked. On regular diet. Discharge possible tomorrow. Continue to monitor.

## 2018-10-02 NOTE — PLAN OF CARE
Problem: Patient Care Overview  Goal: Plan of Care/Patient Progress Review  Outcome: No Change  A&O. VSS on RA.  Incisions WNL with some bruising. Pt resting comfortably this shift with oxycodone for pain control.

## 2018-10-02 NOTE — DISCHARGE INSTRUCTIONS
[facility]  Discharge Instructions: Post-Operative Abdominal Surgery    ACTIVITY    Increase your activity gradually.  Avoid strenuous physical activity or heavy lifting greater than 15 lbs. for 4 weeks.  You may climb stairs.    You may drive without restrictions when you are not using any prescription pain medication and comfortable in a car.    You may return to work/school when you are comfortable without any prescription pain medication.    WOUND CARE    You may remove your bandage and shower 48 hours after the surgery.  Pat your incisions dry and leave open to air.  Re-apply dressing (Band-aid or gauze/tape) as needed for drainage.    You may have steri-strips (looks like tape) or Dermabond (looks like glue) on your incision.  Leave it alone, it will peel up and fall off on its own.      Do not soak your incisions in a tub or pool for 2 weeks.     A lump or ridge under the incision is normal and will gradually resolve.    DIET    Advance your diet to a low residue diet for 2 weeks.  Avoid heavy, spicy, greasy meals and gas forming foods.     You may find your appetite to be diminished briefly after surgery.  You may take nutritional supplement shakes if you are able.     Drinks plenty of fluids to stay hydrated.    PAIN    Expect some tenderness and discomfort at the incision site(s).  Use the prescribed pain medication at your discretion.  Expect gradual resolution of your pain over several days.    You may take ibuprofen with food (unless you have been told not to) instead of or in addition to your prescribed pain medication.     Do not drink alcohol or drive while you are taking pain medications.    You may apply ice to your incisions in 20 minute intervals as needed for the next 24-48 hours.  After that time, consider switching to heat if you prefer.    RETURN APPOINTMENT    Follow up with your surgeon in 10 days.  Please call the office at [phone] to schedule  your appointment.  We are located at [address] [place] [address] [address]  [place] [id number].    CALL OUR OFFICE IF YOU HAVE:     Chills or fever above 101.5 F.    Increased redness or drainage at your incisions.    Significant bleeding.    Pain not relieved by your pain medication or rest.    Increasing pain after the first 48 hours.    Any other concerns or questions.    HELPFUL HINTS    Pain medications can cause constipation.  Limit use when possible.  Take over the counter stool softener/stimulant, such as Colace or Senna, with plenty of water.  You may take a mild over the counter laxative, such as Miralax or a suppository, as needed.    For laparoscopic surgery, you may have shoulder or upper back discomfort due to the gas used in surgery.  This is temporary and should resolve in 48-72 hours.  Short, frequent walks may help with this.                                                                                                                                  Revised August 2017

## 2018-10-02 NOTE — PROGRESS NOTES
General Surgery Progress Note    Subjective: pt doing well. Pain improving. Tolerating regular diet. Having bowel function. No nausea.     Objective: /82 (BP Location: Right arm)  Pulse 79  Temp 97.7  F (36.5  C) (Oral)  Resp 16  SpO2 99%  Breastfeeding? No  Gen: alert, ambulating in room  CV: RRR  Pulm: nonlabored breathing  Abd: soft, nd, ecchymosis by inferior port site. No erythema  Ext: WWP    Assessment/Plan:   Joann Gonzalez  is a 58 year old female s/p laparoscopic repair of internal hernia related to prior gastric bypass. Doing well.   - ready for discharge from surgical perspective  - will have hospitalist weigh in on resumption of anti hypertensives.     Suki Harrison MD  [facility]  [phone]

## 2018-10-02 NOTE — DISCHARGE SUMMARY
[facility]    Discharge Summary  Hospitalist    Date of Admission:  9/29/2018  Date of Discharge:  10/2/2018  Discharging Provider: Carlitos Garcia  Date of Service (when I saw the patient): 10/02/18    History of Present Illness   The patient presented to the emergency room yesterday with abdominal pain.  CT scan was concerning for small bowel volvulus.  The patient was taken emergently to the operating room and underwent laparotomy with adhesio lysis and repair of internal hernia.  She is currently sleeping in her room but complains of uncontrolled pain.  She denies chest pain or shortness of breath.  She had no stool or flatus.  She has history of a Johnny-en-Y gastric bypass in 2003.    Hospital Course   Joann Gonzalez was admitted on 9/29/2018.  The following problems were addressed during her hospitalization:    58 year old female who was admitted on 9/29/2018 with an internal hernia with compression of SMA, s/p laparotomy with adhesiolysis.      Laparotomy with adhesiolysis.  Operative cares per general surgery including pain control and prophylaxis.       Chronic back pain.  Patient has intrathecal pain pump.      Anxiety.  Continue Prozac and Seroquel.      History of CVA.  Resumed Plavix, okay per general surgery.      Hypertension. BP improved.  Lisinopril and amlodipine to resume on discharge.        Urinalysis.  Very few white blood cells in the urine.  Patient denies dysuria.  No indications for antibiotics.      Acute blood loss anemia.  Hemoglobin dropped from 14.4 -> 10 -> 9.3 ->10.1.  Monitor as outpatient.     DVT Prophylaxis: Defer to primary service    Pending Results   These results will be followed up by PCP  Unresulted Labs Ordered in the Past 30 Days of this Admission     No orders found from 7/31/2018 to 9/30/2018.          Code Status   Full Code       Primary Care Physician   Esau Ospina    Physical Exam   Temp: 97.7  F (36.5  C) Temp src: Oral BP: 149/82 Pulse: 79  Heart Rate: 79 Resp: 16 SpO2: 99 % O2 Device: None (Room air)    There were no vitals filed for this visit.  Vital Signs with Ranges  Temp:  [97.7  F (36.5  C)-98.7  F (37.1  C)] 97.7  F (36.5  C)  Pulse:  [60-79] 79  Heart Rate:  [65-79] 79  Resp:  [16-18] 16  BP: (131-174)/(71-84) 149/82  SpO2:  [95 %-99 %] 99 %  I/O last 3 completed shifts:  In: 480 [P.O.:480]  Out: 1650 [Urine:1650]    GENERAL: Alert and oriented. NAD. Conversational, appropriate.   HEENT: Normocephalic. EOMI. No icterus or injection. Nares normal.   LUNGS: Clear to auscultation. No dyspnea at rest.   HEART: Regular rate. Extremities perfused.   ABDOMEN: Soft, nontender, and nondistended. Positive bowel sounds.   EXTREMITIES: No LE edema noted.   NEUROLOGIC: Moves extremities x4. No acute focal neurologic abnormalities noted.     Discharge Disposition   Discharged to home  Condition at discharge: Stable    Consultations This Hospital Stay   HOSPITALIST IP CONSULT    Time Spent on this Encounter   I, Carlitos Garcia, personally saw the patient today and spent greater than 30 minutes discharging this patient.    Discharge Orders     Reason for your hospital stay   Internal hernia s/p laparoscopic repair     Follow-up and recommended labs and tests    Follow up with me,  Suki Harrison, within 1-2 weeks. to evaluate after surgery.  No follow up labs or test are needed. Please call [phone] to schedule. Please follow up with your PCP within one week of discharge.     Activity   Your activity upon discharge: activity as tolerated, do not lift >15 lbs for 4 weeks.     Diet   Follow this diet upon discharge: Orders Placed This Encounter     Regular Diet Adult       Discharge Medications   Current Discharge Medication List      START taking these medications    Details   oxyCODONE IR (ROXICODONE) 5 MG tablet Take 1-2 tablets (5-10 mg) by mouth every 3 hours as needed for moderate to severe pain  Qty: 30 tablet, Refills: 0    Associated  Diagnoses: Small bowel volvulus (H)      polyethylene glycol (MIRALAX/GLYCOLAX) Packet Take 17 g by mouth 2 times daily  Qty: 21 packet, Refills: 1    Associated Diagnoses: Small bowel volvulus (H)         CONTINUE these medications which have NOT CHANGED    Details   amLODIPine (NORVASC) 10 MG tablet Take 1 tablet (10 mg) by mouth daily  Qty: 30 tablet, Refills: 3    Associated Diagnoses: Benign essential hypertension      clopidogrel (PLAVIX) 75 MG tablet TAKE 1 TABLET(75 MG) BY MOUTH DAILY  Qty: 90 tablet, Refills: 2    Associated Diagnoses: Transient cerebral ischemia, unspecified type      FLUoxetine (PROZAC) 20 MG capsule TAKE 2 CAPSULES BY MOUTH EVERY DAY  Qty: 180 capsule, Refills: 3    Associated Diagnoses: Major depressive disorder, single episode in full remission (H); Anxiety state      lisinopril (PRINIVIL/ZESTRIL) 20 MG tablet Take 1 tablet (20 mg) by mouth daily  Qty: 90 tablet, Refills: 3    Associated Diagnoses: Essential hypertension      Multiple Vitamin (MULTI-VITAMIN) per tablet Take 1 tablet by mouth daily.      oxyCODONE-acetaminophen (PERCOCET) 5-325 MG per tablet Take 1 tablet by mouth daily as needed for severe pain      !! QUEtiapine (SEROQUEL) 100 MG tablet TAKE 1 TABLET(100 MG) BY MOUTH AT BEDTIME  Qty: 90 tablet, Refills: 2    Associated Diagnoses: Major depression in complete remission (H)      !! QUEtiapine (SEROQUEL) 25 MG tablet TAKE 1/2 TABLET BY MOUTH TWICE DAILY AS DIRECTED  Qty: 90 tablet, Refills: 0    Associated Diagnoses: Major depression in complete remission (H)       !! - Potential duplicate medications found. Please discuss with provider.      STOP taking these medications       Pregabalin (LYRICA PO) Comments:   Reason for Stopping:             Allergies   Allergies   Allergen Reactions     Advil Pm [Ibuprofen-Diphenhydramine Cit] Other (See Comments)     Or any ibuprophen  Reports red face     Aspirin Unknown     Nsaids      Plaquenil [Hydroxychloroquine] Rash     Data    Most Recent 3 CBC's:  Recent Labs   Lab Test  10/01/18   0525  09/30/18   0745  09/29/18   1958  09/29/18   1421   WBC  6.0  6.5   --   6.9   HGB  10.1*  9.3*  9.3*  10.0*   MCV  92  91   --   90   PLT  174  Canceled, Test credited  155   --   178      Most Recent 3 BMP's:  Recent Labs   Lab Test  10/01/18   0525  09/30/18   0745  09/29/18   1421   NA  143  143  142   POTASSIUM  3.6  3.6  4.1   CHLORIDE  108  110*  110*   CO2  31  27  28   BUN  3*  6*  7   CR  0.45*  0.54  0.78   ANIONGAP  4  6  4   RICKEY  8.3*  7.8*  7.3*   GLC  109*  135*  166*     Most Recent 2 LFT's:  Recent Labs   Lab Test  09/29/18   0039  08/30/18   0945   AST  28  19   ALT  19  15   ALKPHOS  148  133   BILITOTAL  0.2  0.3     Most Recent INR's and Anticoagulation Dosing History:  Anticoagulation Dose History     Recent Dosing and Labs Latest Ref Rng & Units 6/9/2013 6/10/2013 6/10/2013 6/10/2013 6/10/2013 2/28/2014 7/10/2014    INR 0.86 - 1.14 1.28(H) 1.29(H) 1.34(H) 1.31(H) 1.26(H) 0.95 1.04        Most Recent 3 Troponin's:  Recent Labs   Lab Test  07/10/15   2010  08/05/14   2005  08/05/14   1737   TROPI  <0.015  The 99th percentile for upper reference range is 0.045 ug/L.  Troponin values in   the range of 0.045 - 0.120 ug/L may be associated with risks of adverse   clinical events.    <0.015  The 99th percentile for upper reference range is 0.045 ug/L.  Troponin values in   the range of 0.045 - 0.120 ug/L may be associated with risks of adverse   clinical events.   Effective 7/30/2014, the reference range for this assay has changed to reflect   new instrumentation/methodology.    Unsatisfactory specimen - hemolyzed   Charge credited       Most Recent Cholesterol Panel:  Recent Labs   Lab Test  08/30/18   0945   CHOL  186   LDL  103*   HDL  61   TRIG  111     Most Recent 6 Bacteria Isolates From Any Culture (See EPIC Reports for Culture Details):  Recent Labs   Lab Test  01/26/18   1054  03/13/15   1943  07/10/14   1820  07/10/14   1815   07/10/14   1658  12/01/13   2045   CULT  No beta hemolytic Streptococcus Group A isolated  10,000 to 50,000 colonies/mL Escherichia coli*  No growth  No growth  >100,000 colonies/mL Escherichia coli*  No growth     Most Recent TSH, T4 and A1c Labs:  Recent Labs   Lab Test  08/30/18   0945   07/11/14   0410   TSH  1.27   < >   --    A1C   --    --   5.8    < > = values in this interval not displayed.     Results for orders placed or performed during the hospital encounter of 09/29/18   CT Abdomen Pelvis w Contrast    Narrative    CT ABDOMEN AND PELVIS WITH CONTRAST  9/29/2018 1:13 AM     HISTORY: Acute abdominal pain, mostly left-sided. Nausea.    COMPARISON: 7/10/2015.    TECHNIQUE: Following the uneventful administration of 62 mL Isovue-370  intravenous contrast, helical sections were acquired from the top of  the diaphragm through the pubic symphysis. Coronal reconstructions  were generated. Radiation dose for this scan was reduced using  automated exposure control, adjustment of the mA and/or kV according  to the patient's size, or iterative reconstruction technique.    FINDINGS:  Abdomen: The liver, spleen, pancreas, adrenal glands are unremarkable.  Mild focal atrophy of the interpolar region of the right kidney. 0.3  cm nonobstructing calculus in the upper pole of the left kidney. The  gallbladder is not visualized, likely representing prior  cholecystectomy. The common bile duct is at the upper limits of normal  in caliber, measuring 1.0 cm in diameter. Prior gastric bypass  surgery. 3 cm paraampullary duodenal diverticulum. Atherosclerotic  calcification in the abdominal aorta.    Scan through the lower chest is significant for coronary artery  calcification.    Pelvis: The small and large bowel are nondilated. The appendix is  unremarkable. There is swirling of the blood vessels about the root of  the small bowel mesentery (for example, series 3 image 29). The  superior mesenteric vein becomes very small in  caliber at this  location. Additionally, there is prominent mesenteric edema associated  with a long segment of small bowel in the left hemiabdomen (for  example, series 3 image 41). No bowel wall thickening, pneumatosis or  free intraperitoneal gas. The uterus is not visualized. No enlarged  lymph nodes or free fluid in the pelvis. Fusion hardware in the lumbar  spine.      Impression    IMPRESSION:  1. Swirling of the blood vessels about root of the small bowel  mesentery with secondary narrowing of the superior mesenteric vein and  edema within the mesentery of a long segment of small bowel in the  left hemiabdomen. These findings could represent a small bowel  volvulus. Of note, there is no associated bowel dilatation or wall  thickening. Recommend correlation with the patient's symptoms.  2. No other cause of acute pain identified in the abdomen or pelvis.    The findings were called to Dr. Green by Dr. Luis on 9/29/2018  at 0128 hours.    ANNEL LUIS MD

## 2018-10-03 ENCOUNTER — TELEPHONE (OUTPATIENT)
Dept: FAMILY MEDICINE | Facility: CLINIC | Age: 58
End: 2018-10-03

## 2018-10-03 LAB — INTERPRETATION ECG - MUSE: NORMAL

## 2018-10-03 NOTE — TELEPHONE ENCOUNTER
ED / Discharge Outreach Protocol    Patient Contact    Attempt # 1    Was call answered?  No.  Left message on voicemail with information to call triage back.      Reason for your hospital stay   Internal hernia s/p laparoscopic repair      Follow-up and recommended labs and tests    Follow up with me,  Suki Harrison, within 1-2 weeks. to evaluate after surgery.  No follow up labs or test are needed. Please call [phone] to schedule. Please follow up with your PCP within one week of discharge.      Activity   Your activity upon discharge: activity as tolerated, do not lift >15 lbs for 4 weeks.      Diet   Follow this diet upon discharge: Orders Placed This Encounter     Regular Diet Adult          Discharge Medications            Current Discharge Medication List             START taking these medications     Details   oxyCODONE IR (ROXICODONE) 5 MG tablet Take 1-2 tablets (5-10 mg) by mouth every 3 hours as needed for moderate to severe pain  Qty: 30 tablet, Refills: 0     Associated Diagnoses: Small bowel volvulus (H)       polyethylene glycol (MIRALAX/GLYCOLAX) Packet Take 17 g by mouth 2 times daily  Qty: 21 packet, Refills: 1     Associated Diagnoses: Small bowel volvulus (H)                 CONTINUE these medications which have NOT CHANGED     Details   amLODIPine (NORVASC) 10 MG tablet Take 1 tablet (10 mg) by mouth daily  Qty: 30 tablet, Refills: 3     Associated Diagnoses: Benign essential hypertension       clopidogrel (PLAVIX) 75 MG tablet TAKE 1 TABLET(75 MG) BY MOUTH DAILY  Qty: 90 tablet, Refills: 2     Associated Diagnoses: Transient cerebral ischemia, unspecified type       FLUoxetine (PROZAC) 20 MG capsule TAKE 2 CAPSULES BY MOUTH EVERY DAY  Qty: 180 capsule, Refills: 3     Associated Diagnoses: Major depressive disorder, single episode in full remission (H); Anxiety state       lisinopril (PRINIVIL/ZESTRIL) 20 MG tablet Take 1 tablet (20 mg) by mouth daily  Qty: 90 tablet, Refills: 3      Associated Diagnoses: Essential hypertension       Multiple Vitamin (MULTI-VITAMIN) per tablet Take 1 tablet by mouth daily.       oxyCODONE-acetaminophen (PERCOCET) 5-325 MG per tablet Take 1 tablet by mouth daily as needed for severe pain       !! QUEtiapine (SEROQUEL) 100 MG tablet TAKE 1 TABLET(100 MG) BY MOUTH AT BEDTIME  Qty: 90 tablet, Refills: 2     Associated Diagnoses: Major depression in complete remission (H)       !! QUEtiapine (SEROQUEL) 25 MG tablet TAKE 1/2 TABLET BY MOUTH TWICE DAILY AS DIRECTED  Qty: 90 tablet, Refills: 0     Associated Diagnoses: Major depression in complete remission (H)        !! - Potential duplicate medications found. Please discuss with provider.            STOP taking these medications         Pregabalin (LYRICA PO) Comments:   Reason for Stopping:

## 2018-10-04 NOTE — TELEPHONE ENCOUNTER
"  ED for acute condition Discharge Protocol    \"Hi, my name is Alda Ramírez, a registered nurse, and I am calling from [facility].  I am calling to follow up and see how things are going for you after your recent emergency visit.\"    Tell me how you are doing now that you are home?\" doing well      Discharge Instructions    \"Let's review your discharge instructions.  What is/are the follow-up recommendations?  Pt. Response: I have an appointment on Monday, was told to follow up with Dr. Ospina    \"Has an appointment with your primary care provider been scheduled?\"  Yes. (confirm and remind to bring meds)    Medications    \"Tell me what changed about your medicines when you discharged?\"    Nothing changed    \"What questions do you have about your medications?\"   None        Call Summary    \"What questions or concerns do you have about your recent visit and your follow-up care?\"     none    \"If you have questions or things don't continue to improve, we encourage you contact us through the main clinic number (give number).  Even if the clinic is not open, triage nurses are available 24/7 to help you.     We would like you to know that our clinic has extended hours (provide information).  We also have urgent care (provide details on closest location and hours/contact info)\"    \"Thank you for your time and take care!\"                "

## 2018-10-08 ENCOUNTER — OFFICE VISIT (OUTPATIENT)
Dept: FAMILY MEDICINE | Facility: CLINIC | Age: 58
End: 2018-10-08
Payer: MEDICARE

## 2018-10-08 ENCOUNTER — TELEPHONE (OUTPATIENT)
Dept: FAMILY MEDICINE | Facility: CLINIC | Age: 58
End: 2018-10-08

## 2018-10-08 VITALS
TEMPERATURE: 97.8 F | HEART RATE: 104 BPM | BODY MASS INDEX: 22.77 KG/M2 | WEIGHT: 120.5 LBS | RESPIRATION RATE: 16 BRPM | DIASTOLIC BLOOD PRESSURE: 66 MMHG | OXYGEN SATURATION: 98 % | SYSTOLIC BLOOD PRESSURE: 120 MMHG

## 2018-10-08 DIAGNOSIS — K56.2: Primary | ICD-10-CM

## 2018-10-08 DIAGNOSIS — K45.0 OBSTRUCTED INTERNAL HERNIA: ICD-10-CM

## 2018-10-08 DIAGNOSIS — I10 BENIGN ESSENTIAL HYPERTENSION: ICD-10-CM

## 2018-10-08 DIAGNOSIS — M96.1 POSTLAMINECTOMY SYNDROME, LUMBAR REGION: ICD-10-CM

## 2018-10-08 DIAGNOSIS — K56.2 SMALL BOWEL VOLVULUS (H): ICD-10-CM

## 2018-10-08 PROCEDURE — 99495 TRANSJ CARE MGMT MOD F2F 14D: CPT | Performed by: FAMILY MEDICINE

## 2018-10-08 RX ORDER — OXYCODONE HYDROCHLORIDE 5 MG/1
5-10 TABLET ORAL
Qty: 20 TABLET | Refills: 0 | Status: SHIPPED | OUTPATIENT
Start: 2018-10-08 | End: 2021-04-10

## 2018-10-08 ASSESSMENT — PAIN SCALES - GENERAL: PAINLEVEL: MODERATE PAIN (5)

## 2018-10-08 NOTE — TELEPHONE ENCOUNTER
oxyCODONE IR (ROXICODONE) 5 MG tablet 20 tablet 0 10/8/2018  No      Sig - Route: Take 1-2 tablets (5-10 mg) by mouth every 3 hours as needed for moderate to severe pain - Oral     Class: Local Print     Order: [id number]     Patient has a monthly prescription for percocet from a pain clinic, is this still okay to fill?     Routing to provider for review/advise.

## 2018-10-08 NOTE — MR AVS SNAPSHOT
After Visit Summary   10/8/2018    Joann Gonzalez    MRN: [id number]           Patient Information     Date Of Birth          1960        Visit Information        Provider Department      10/8/2018 2:30 PM Esau Ospina MD [facility]        Today's Diagnoses     Volvulus of intestine (H)    -  1    Obstructed internal hernia        Postlaminectomy syndrome, lumbar region        Benign essential hypertension        Small bowel volvulus (H)          Care Instructions    Frequent hot packs to abdomen          Follow-ups after your visit        Follow-up notes from your care team     Return in about 1 month (around 11/8/2018).      Your next 10 appointments already scheduled     Oct 09, 2018 10:45 AM CDT   [facility] with Suki Harrison MD   [facility]    [address], [address]  [facility] [place] [address] [phone]              Who to contact     If you have questions or need follow up information about today's clinic visit or your schedule please contact [facility] directly at [phone].  Normal or non-critical lab and imaging results will be communicated to you by MyChart, letter or phone within 4 business days after the clinic has received the results. If you do not hear from us within 7 days, please contact the clinic through MyChart or phone. If you have a critical or abnormal lab result, we will notify you by phone as soon as possible.  Submit refill requests through [employer] or call your pharmacy and they will forward the refill request to us. Please allow 3 business days for your refill to be completed.          Additional Information About Your Visit        Care EveryWhere ID     This is your Care EveryWhere ID. This could be used by other organizations to access your [facility] medical records  [id number]        Your Vitals Were     Pulse Temperature Respirations Pulse Oximetry  BMI (Body Mass Index)       104 97.8  F (36.6  C) (Tympanic) 16 98% 22.77 kg/m2        Blood Pressure from Last 3 Encounters:   10/08/18 120/66   10/02/18 136/61   08/30/18 112/76    Weight from Last 3 Encounters:   10/08/18 120 lb 8 oz (54.7 kg)   08/30/18 124 lb (56.2 kg)   01/26/18 118 lb 8 oz (53.8 kg)              Today, you had the following     No orders found for display         Where to get your medicines      Some of these will need a paper prescription and others can be bought over the counter.  Ask your nurse if you have questions.     Bring a paper prescription for each of these medications     oxyCODONE IR 5 MG tablet         Information about OPIOIDS     PRESCRIPTION OPIOIDS: WHAT YOU NEED TO KNOW   We gave you an opioid (narcotic) pain medicine. It is important to manage your pain, but opioids are not always the best choice. You should first try all the other options your care team gave you. Take this medicine for as short a time (and as few doses) as possible.    Some activities can increase your pain, such as bandage changes or therapy sessions. It may help to take your pain medicine 30 to 60 minutes before these activities. Reduce your stress by getting enough sleep, working on hobbies you enjoy and practicing relaxation or meditation. Talk to your care team about ways to manage your pain beyond prescription opioids.    These medicines have risks:    DO NOT drive when on new or higher doses of pain medicine. These medicines can affect your alertness and reaction times, and you could be arrested for driving under the influence (DUI). If you need to use opioids long-term, talk to your care team about driving.    DO NOT operate heavy machinery    DO NOT do any other dangerous activities while taking these medicines.    DO NOT drink any alcohol while taking these medicines.     If the opioid prescribed includes acetaminophen, DO NOT take with any other medicines that contain acetaminophen. Read all  labels carefully. Look for the word  acetaminophen  or  Tylenol.  Ask your pharmacist if you have questions or are unsure.    You can get addicted to pain medicines, especially if you have a history of addiction (chemical, alcohol or substance dependence). Talk to your care team about ways to reduce this risk.    All opioids tend to cause constipation. Drink plenty of water and eat foods that have a lot of fiber, such as fruits, vegetables, prune juice, apple juice and high-fiber cereal. Take a laxative (Miralax, milk of magnesia, Colace, Senna) if you don t move your bowels at least every other day. Other side effects include upset stomach, sleepiness, dizziness, throwing up, tolerance (needing more of the medicine to have the same effect), physical dependence and slowed breathing.    Store your pills in a secure place, locked if possible. We will not replace any lost or stolen medicine. If you don t finish your medicine, please throw away (dispose) as directed by your pharmacist. The [place] Pollution Control Agency has more information about safe disposal: https://www.pca.[facility].[place].us/living-green/managing-unwanted-medications         Primary Care Provider Office Phone # Fax #    Esau Ospina [phone]       [address] [facility] [address]        Equal Access to Services     JUAN CARLOS SCHMIDT AH: Hadkatie becerril hadasho Soomaali, waaxda luqadaha, qaybta kaalmada adeegyada, leigh ann novak. So [facility] [phone].    ATENCIÓN: Si habla español, tiene a lugo disposición servicios gratuitos de asistencia lingüística. Llame al [phone].    We comply with applicable federal civil rights laws and [place] laws. We do not discriminate on the basis of race, color, national origin, age, disability, sex, sexual orientation, or gender identity.            Thank you!     Thank you for choosing [facility]  for your care. Our goal is always to  provide you with excellent care. Hearing back from our patients is one way we can continue to improve our services. Please take a few minutes to complete the written survey that you may receive in the mail after your visit with us. Thank you!             Your Updated Medication List - Protect others around you: Learn how to safely use, store and throw away your medicines at www.disposemymeds.org.          This list is accurate as of 10/8/18  2:41 PM.  Always use your most recent med list.                   Brand Name Dispense Instructions for use Diagnosis    amLODIPine 10 MG tablet    NORVASC    30 tablet    Take 1 tablet (10 mg) by mouth daily    Benign essential hypertension       clopidogrel 75 MG tablet    PLAVIX    90 tablet    TAKE 1 TABLET(75 MG) BY MOUTH DAILY    Transient cerebral ischemia, unspecified type       FLUoxetine 20 MG capsule    PROzac    180 capsule    TAKE 2 CAPSULES BY MOUTH EVERY DAY    Major depressive disorder, single episode in full remission (H), Anxiety state       lisinopril 20 MG tablet    PRINIVIL/ZESTRIL    90 tablet    Take 1 tablet (20 mg) by mouth daily    Essential hypertension       Multi-vitamin Tabs tablet   Generic drug:  multivitamin, therapeutic with minerals      Take 1 tablet by mouth daily.        oxyCODONE IR 5 MG tablet    ROXICODONE    20 tablet    Take 1-2 tablets (5-10 mg) by mouth every 3 hours as needed for moderate to severe pain    Small bowel volvulus (H)       oxyCODONE-acetaminophen 5-325 MG per tablet    PERCOCET     Take 1 tablet by mouth daily as needed for severe pain        polyethylene glycol Packet    MIRALAX/GLYCOLAX    21 packet    Take 17 g by mouth 2 times daily    Small bowel volvulus (H)       * QUEtiapine 100 MG tablet    SEROquel    90 tablet    TAKE 1 TABLET(100 MG) BY MOUTH AT BEDTIME    Major depression in complete remission (H)       * QUEtiapine 25 MG tablet    SEROquel    90 tablet    TAKE 1/2 TABLET BY MOUTH TWICE DAILY AS DIRECTED     Major depression in complete remission (H)       * Notice:  This list has 2 medication(s) that are the same as other medications prescribed for you. Read the directions carefully, and ask your doctor or other care provider to review them with you.

## 2018-10-08 NOTE — PROGRESS NOTES
SUBJECTIVE:   Joann Gonzalez is a 58 year old female who presents to clinic today for the following health issues:    Hospital Follow-up Visit:    Hospital/Nursing Home/IP Rehab Facility: [facility]  Date of Admission: 9/29/2018  Date of Discharge: 10/2/2018  Reason(s) for Admission: obstructed internal hernia, small bowel volvulus            Problems taking medications regularly:  None       Medication changes since discharge: YES- Miralax and Percocet       Problems adhering to non-medication therapy:  None    Summary of hospitalization:  [facility] discharge summary reviewed  Diagnostic Tests/Treatments reviewed.  Follow up needed: here and with surgery  Other Healthcare Providers Involved in Patient s Care:         Surgical follow-up appointment - this week  Update since discharge: improved. Still painful across abdomen    Post Discharge Medication Reconciliation: discharge medications reconciled and changed, per note/orders (see AVS).  Plan of care communicated with patient     Coding guidelines for this visit:  Type of Medical   Decision Making Face-to-Face Visit       within 7 Days of discharge Face-to-Face Visit        within 14 days of discharge   Moderate Complexity [id number]   High Complexity 19760 [id number]            Abdominal Pain      Duration: since before surgery    Description (location/character/radiation): pain across abdomen       Associated flank pain: None    Intensity:  moderate    Accompanying signs and symptoms:        Fever/Chills: no        Gas/Bloating: YES       Nausea/vomitting: no        Diarrhea: no        Dysuria or Hematuria: no     History (previous similar pain/trauma/previous testing): previous abdominal surgery    Precipitating or alleviating factors:       Pain worse with eating/BM/urination: no       Pain relieved by BM: no     Therapies tried and outcome: pain medication. Careful movement    LMP:  not applicable      Problem list and histories  reviewed & adjusted, as indicated.  Additional history: as documented    Labs reviewed in EPIC    Reviewed and updated as needed this visit by clinical staff       Reviewed and updated as needed this visit by Provider         ROS:  CONSTITUTIONAL:NEGATIVE for fever, chills, change in weight  RESP:NEGATIVE for significant cough or SOB  CV: NEGATIVE for chest pain, palpitations or peripheral edema  GI: POSITIVE for abdominal pain periumbilical and gas or bloating and NEGATIVE for constipation, diarrhea, nausea and vomiting    OBJECTIVE:                                                    /66 (BP Location: Left arm, Patient Position: Sitting, Cuff Size: Adult Regular)  Pulse 104  Temp 97.8  F (36.6  C) (Tympanic)  Resp 16  Wt 120 lb 8 oz (54.7 kg)  SpO2 98%  BMI 22.77 kg/m2  Body mass index is 22.77 kg/(m^2).  GENERAL APPEARANCE: healthy, alert and no distress  RESP: lungs clear to auscultation - no rales, rhonchi or wheezes  CV: regular rates and rhythm, normal S1 S2, no S3 or S4 and no murmur, click or rub  ABDOMEN: bowel sounds normal, no hepatosplenomegaly or masses and tender mid abdomen, no rebound or referred pain  SKIN: ecchymoses - abdomen    Diagnostic test results:  none      ASSESSMENT/PLAN:                                                        ICD-10-CM    1. Volvulus of intestine (H) K56.2     reduced with surgery   2. Obstructed internal hernia K45.0     reduced with surgery   3. Postlaminectomy syndrome, lumbar region M96.1    4. Benign essential hypertension I10    5. Small bowel volvulus (H) K56.2 oxyCODONE IR (ROXICODONE) 5 MG tablet       Follow up with Provider - 1 mo   Refill limited Rx for Oxycodone  Keep appt with surgeon for follow up   Patient Instructions   Frequent hot packs to abdomen      Esau Ospina MD  [facility]

## 2018-10-09 ENCOUNTER — OFFICE VISIT (OUTPATIENT)
Dept: SURGERY | Facility: CLINIC | Age: 58
End: 2018-10-09
Payer: MEDICARE

## 2018-10-09 DIAGNOSIS — Z09 SURGERY FOLLOW-UP EXAMINATION: Primary | ICD-10-CM

## 2018-10-09 PROCEDURE — 99024 POSTOP FOLLOW-UP VISIT: CPT | Performed by: SURGERY

## 2018-10-09 NOTE — MR AVS SNAPSHOT
After Visit Summary   10/9/2018    Joann Gonzalez    MRN: [id number]           Patient Information     Date Of Birth          1960        Visit Information        Provider Department      10/9/2018 10:45 AM Suki Harrison MD [facility] [place] [facility] General Surgery      Today's Diagnoses     Surgery follow-up examination    -  1       Follow-ups after your visit        Your next 10 appointments already scheduled     Oct 09, 2018 10:45 AM CDT   Post Op with Suki Harrison MD   [facility] [place] [facility] [place]    [address]  [place] [address] [phone]              Who to contact     If you have questions or need follow up information about today's clinic visit or your schedule please contact [facility] directly at [phone].  Normal or non-critical lab and imaging results will be communicated to you by MyChart, letter or phone within 4 business days after the clinic has received the results. If you do not hear from us within 7 days, please contact the clinic through MyChart or phone. If you have a critical or abnormal lab result, we will notify you by phone as soon as possible.  Submit refill requests through "[employer] or call your pharmacy and they will forward the refill request to us. Please allow 3 business days for your refill to be completed.          Additional Information About Your Visit        Care EveryWhere ID     This is your Care EveryWhere ID. This could be used by other organizations to access your [place] medical records  [id number]         Blood Pressure from Last 3 Encounters:   10/08/18 120/66   10/02/18 136/61   08/30/18 112/76    Weight from Last 3 Encounters:   10/08/18 120 lb 8 oz (54.7 kg)   08/30/18 124 lb (56.2 kg)   01/26/18 118 lb 8 oz (53.8 kg)              Today, you had the following     No orders found for display       Primary Care Provider Office Phone #  Fax #    Esau Ospina [phone] [address]  [facility] [address]        Equal Access to Services     ZAMZAMBETH ROXANA : Ramu jose e becerril abilioo Soyrnali, waaxda luqadaha, qaybta kaalmada adestevenda, leigh ann erickson lajammiebina novak. So [facility] [phone].    ATENCIÓN: Si habla español, tiene a lugo disposición servicios gratuitos de asistencia lingüística. Llame al [phone].    We comply with applicable federal civil rights laws and [place] laws. We do not discriminate on the basis of race, color, national origin, age, disability, sex, sexual orientation, or gender identity.            Thank you!     Thank you for choosing [facility]  for your care. Our goal is always to provide you with excellent care. Hearing back from our patients is one way we can continue to improve our services. Please take a few minutes to complete the written survey that you may receive in the mail after your visit with us. Thank you!             Your Updated Medication List - Protect others around you: Learn how to safely use, store and throw away your medicines at www.disposemymeds.org.          This list is accurate as of 10/9/18 10:44 AM.  Always use your most recent med list.                   Brand Name Dispense Instructions for use Diagnosis    amLODIPine 10 MG tablet    NORVASC    30 tablet    Take 1 tablet (10 mg) by mouth daily    Benign essential hypertension       clopidogrel 75 MG tablet    PLAVIX    90 tablet    TAKE 1 TABLET(75 MG) BY MOUTH DAILY    Transient cerebral ischemia, unspecified type       FLUoxetine 20 MG capsule    PROzac    180 capsule    TAKE 2 CAPSULES BY MOUTH EVERY DAY    Major depressive disorder, single episode in full remission (H), Anxiety state       lisinopril 20 MG tablet    PRINIVIL/ZESTRIL    90 tablet    Take 1 tablet (20 mg) by mouth daily    Essential hypertension       Multi-vitamin Tabs tablet   Generic drug:  multivitamin, therapeutic with  minerals      Take 1 tablet by mouth daily.        oxyCODONE IR 5 MG tablet    ROXICODONE    20 tablet    Take 1-2 tablets (5-10 mg) by mouth every 3 hours as needed for moderate to severe pain    Small bowel volvulus (H)       oxyCODONE-acetaminophen 5-325 MG per tablet    PERCOCET     Take 1 tablet by mouth daily as needed for severe pain        polyethylene glycol Packet    MIRALAX/GLYCOLAX    21 packet    Take 17 g by mouth 2 times daily    Small bowel volvulus (H)       * QUEtiapine 100 MG tablet    SEROquel    90 tablet    TAKE 1 TABLET(100 MG) BY MOUTH AT BEDTIME    Major depression in complete remission (H)       * QUEtiapine 25 MG tablet    SEROquel    90 tablet    TAKE 1/2 TABLET BY MOUTH TWICE DAILY AS DIRECTED    Major depression in complete remission (H)       * Notice:  This list has 2 medication(s) that are the same as other medications prescribed for you. Read the directions carefully, and ask your doctor or other care provider to review them with you.

## 2018-10-09 NOTE — LETTER
2018    Re: Joann Gonzalez - 1960    S: Joann returns for follow up after laparoscopic repair of internal hernia. She is doing much better. Still has some abdominal pain, primarily at incisions. She does not have her usual appetite back yet and is feeling full pretty quickly after eating small amounts.      Abdomen: incisions healing well. No erythema or induration. 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 She is doing very well overall. Her abdominal pain is largely related to the ecchymosis on her left lower abdomen. I would expect her appetite to rebound over the next couple weeks. I encouraged her to stop smoking and we discussed marginal ulcers/perforation risk with smoking. She should follow up with Bariatric surgery (Dr. Newman) if she has persistent abdominal pain going forward.      Thank you for the opportunity to help in her care.     Suki Harrison  [facility]  [phone]

## 2018-10-09 NOTE — PROGRESS NOTES
Surgery Postoperative Note    S: Joann returns for follow up after laparoscopic repair of internal hernia. She is doing much better. Still has some abdominal pain, primarily at incisions. She does not have her usual appetite back yet and is feeling full pretty quickly after eating small amounts.     Abdomen: incisions healing well. No erythema or induration. 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 She is doing very well overall. Her abdominal pain is largely related to the ecchymosis on her left lower abdomen. I would expect her appetite to rebound over the next couple weeks. I encouraged her to stop smoking and we discussed marginal ulcers/perforation risk with smoking. She should follow up with Bariatric surgery (Dr. Newman) if she has persistent abdominal pain going forward.     Thank you for the opportunity to help in her care.    Suki Harrison  [facility]  [phone]    Please route or send letter to:  Primary Care Provider (PCP) and Referring Provider

## 2018-11-19 DIAGNOSIS — F32.5 MAJOR DEPRESSION IN COMPLETE REMISSION (H): ICD-10-CM

## 2018-11-19 NOTE — TELEPHONE ENCOUNTER
Requested Prescriptions   Pending Prescriptions Disp Refills     QUEtiapine (SEROQUEL) 100 MG tablet [Pharmacy Med Name: QUETIAPINE 100MG TABLETS]  Last Written Prescription Date:  02/22/2018  Last Fill Quantity: 90,  # refills: 2   Last office visit: 10/8/2018 with prescribing provider:  SANGITA   Future Office Visit:     90 tablet 0     Sig: TAKE 1 TABLET(100 MG) BY MOUTH AT BEDTIME    Antipsychotic Medications Passed    11/19/2018  3:21 PM       Passed - Blood pressure under 140/90 in past 12 months    BP Readings from Last 3 Encounters:   10/08/18 120/66   10/02/18 136/61   08/30/18 112/76                Passed - Patient is 12 years of age or older       Passed - Lipid panel on file within the past 12 months    Recent Labs   Lab Test  08/30/18   0945   12/04/13   1300   CHOL  186   < >  188   TRIG  111   < >  121   HDL  61   < >  73   LDL  103*   < >  91   NHDL  125   < >   --    VLDL   --    --   24   CHOLHDLRATIO   --    --   2.6    < > = values in this interval not displayed.              Passed - CBC on file in past 12 months    Recent Labs   Lab Test  10/01/18   0525   WBC  6.0   RBC  3.31*   HGB  10.1*   HCT  30.3*   PLT  174  Canceled, Test credited                Passed - Heart Rate on file within past 12 months    Pulse Readings from Last 3 Encounters:   10/08/18 104   10/02/18 61   08/30/18 80              Passed - A1c or Glucose on file in past 12 months    Recent Labs   Lab Test  10/01/18   0525   07/11/14   0410   GLC  109*   < >  105*   A1C   --    --   5.8    < > = values in this interval not displayed.       Please review patients last 3 weights. If a weight gain of >10 lbs exists, you may refill the prescription once after instructing the patient to schedule an appointment within the next 30 days.    Wt Readings from Last 3 Encounters:   10/08/18 120 lb 8 oz (54.7 kg)   08/30/18 124 lb (56.2 kg)   01/26/18 118 lb 8 oz (53.8 kg)            Passed - Patient is not pregnant       Passed - No positve  "pregnancy test on file in past 12 months       Passed - Recent (6 mo) or future (30 days) visit within the authorizing provider's specialty    Patient had office visit in the last 6 months or has a visit in the next 30 days with authorizing provider or within the authorizing provider's specialty.  See \"Patient Info\" tab in inbasket, or \"Choose Columns\" in Meds & Orders section of the refill encounter.              "

## 2018-11-20 RX ORDER — QUETIAPINE FUMARATE 100 MG/1
TABLET, FILM COATED ORAL
Qty: 90 TABLET | Refills: 0 | Status: SHIPPED | OUTPATIENT
Start: 2018-11-20 | End: 2019-02-15

## 2018-11-26 DIAGNOSIS — G45.9 TRANSIENT CEREBRAL ISCHEMIA, UNSPECIFIED TYPE: ICD-10-CM

## 2018-11-26 NOTE — TELEPHONE ENCOUNTER
"Requested Prescriptions   Pending Prescriptions Disp Refills     clopidogrel (PLAVIX) 75 MG tablet [Pharmacy Med Name: CLOPIDOGREL 75MG TABLETS]  Last Written Prescription Date:  03/13/2018  Last Fill Quantity: 90,  # refills: 2   Last office visit: 10/8/2018 with prescribing provider:  SANGITA   Future Office Visit:     90 tablet 0     Sig: TAKE 1 TABLET(75 MG) BY MOUTH DAILY    Plavix Failed    11/26/2018  3:57 AM       Failed - Normal HGB on file in past 12 months    Recent Labs   Lab Test  10/01/18   0525   HGB  10.1*              Passed - No active PPI on record unless is Protonix       Passed - Normal Platelets on file in past 12 months    Recent Labs   Lab Test  10/01/18   0525   PLT  174  Canceled, Test credited              Passed - Recent (12 mo) or future (30 days) visit within the authorizing provider's specialty    Patient had office visit in the last 12 months or has a visit in the next 30 days with authorizing provider or within the authorizing provider's specialty.  See \"Patient Info\" tab in inbasket, or \"Choose Columns\" in Meds & Orders section of the refill encounter.             Passed - Patient is age 18 or older       Passed - No active pregnancy on record       Passed - No positive pregnancy test in past 12 months          "

## 2018-11-27 RX ORDER — CLOPIDOGREL BISULFATE 75 MG/1
TABLET ORAL
Qty: 90 TABLET | Refills: 0 | Status: SHIPPED | OUTPATIENT
Start: 2018-11-27 | End: 2019-02-24

## 2019-01-06 ENCOUNTER — TELEPHONE (OUTPATIENT)
Dept: FAMILY MEDICINE | Facility: CLINIC | Age: 59
End: 2019-01-06

## 2019-01-06 NOTE — LETTER
[facility]  [address]  [place] [facility] [facility] [address]  [phone]                                                                                                           Joann Gonzalez  [address]   [place] [facility] [address]    January 6, 2019      Dear Joann,    Dr. Ospina wanted me to reach out to you to see how you're doing. Dr. Ospina asked me to     message you to request that you complete the Patient Health Questionnaire so he can    better understand if your treatment is helping with your moods.  I have attached the     questionnaire to this message. Please call anytime if you have any questions. Our    number is [phone].  We really care about you and want to help you in every way     possible to feel better.    Thanks and we look forward to hearing from you,      Princess PASTOR Solis CMA and Esau Ospina MD

## 2019-02-15 DIAGNOSIS — F32.5 MAJOR DEPRESSION IN COMPLETE REMISSION (H): ICD-10-CM

## 2019-02-15 DIAGNOSIS — I10 BENIGN ESSENTIAL HYPERTENSION: ICD-10-CM

## 2019-02-15 NOTE — TELEPHONE ENCOUNTER
QUETIAPINE 100MG TABLETS  Last Written Prescription Date:  11/30/18  Last Fill Quantity: 90,  # refills: 0   Last office visit: 10/8/2018 with prescribing provider:  10/08/18   Future Office Visit:    Requested Prescriptions   Pending Prescriptions Disp Refills     QUEtiapine (SEROQUEL) 100 MG tablet [Pharmacy Med Name: QUETIAPINE 100MG TABLETS] 90 tablet 0     Sig: TAKE 1 TABLET(100 MG) BY MOUTH AT BEDTIME    Antipsychotic Medications Passed - 2/15/2019  1:02 PM       Passed - Blood pressure under 140/90 in past 12 months    BP Readings from Last 3 Encounters:   10/08/18 120/66   10/02/18 136/61   08/30/18 112/76                Passed - Patient is 12 years of age or older       Passed - Lipid panel on file within the past 12 months    Recent Labs   Lab Test 08/30/18  0945  12/04/13  1300   CHOL 186   < > 188   TRIG 111   < > 121   HDL 61   < > 73   *   < > 91   NHDL 125   < >  --    VLDL  --   --  24   CHOLHDLRATIO  --   --  2.6    < > = values in this interval not displayed.              Passed - CBC on file in past 12 months    Recent Labs   Lab Test 10/01/18  0525   WBC 6.0   RBC 3.31*   HGB 10.1*   HCT 30.3*     Canceled, Test credited                Passed - Heart Rate on file within past 12 months    Pulse Readings from Last 3 Encounters:   10/08/18 104   10/02/18 61   08/30/18 80              Passed - A1c or Glucose on file in past 12 months    Recent Labs   Lab Test 10/01/18  0525  07/11/14  0410   *   < > 105*   A1C  --   --  5.8    < > = values in this interval not displayed.       Please review patients last 3 weights. If a weight gain of >10 lbs exists, you may refill the prescription once after instructing the patient to schedule an appointment within the next 30 days.    Wt Readings from Last 3 Encounters:   10/08/18 54.7 kg (120 lb 8 oz)   08/30/18 56.2 kg (124 lb)   01/26/18 53.8 kg (118 lb 8 oz)            Passed - Medication is active on med list       Passed - Patient is not  "pregnant       Passed - No positve pregnancy test on file in past 12 months       Passed - Recent (6 mo) or future (30 days) visit within the authorizing provider's specialty    Patient had office visit in the last 6 months or has a visit in the next 30 days with authorizing provider or within the authorizing provider's specialty.  See \"Patient Info\" tab in inbasket, or \"Choose Columns\" in Meds & Orders section of the refill encounter.            amLODIPine (NORVASC) 10 MG tablet [Pharmacy Med Name: AMLODIPINE BESYLATE 10MG TABLETS] 30 tablet 0     Sig: TAKE 1 TABLET(10 MG) BY MOUTH DAILY    Calcium Channel Blockers Protocol  Failed - 2/15/2019  1:02 PM       Failed - Normal serum creatinine on file in past 12 months    Recent Labs   Lab Test 10/01/18  0525  10/20/12  2031   CR 0.45*   < >  --    CREAT  --   --  0.6    < > = values in this interval not displayed.            Passed - Blood pressure under 140/90 in past 12 months    BP Readings from Last 3 Encounters:   10/08/18 120/66   10/02/18 136/61   08/30/18 112/76                Passed - Recent (12 mo) or future (30 days) visit within the authorizing provider's specialty    Patient had office visit in the last 12 months or has a visit in the next 30 days with authorizing provider or within the authorizing provider's specialty.  See \"Patient Info\" tab in inbasket, or \"Choose Columns\" in Meds & Orders section of the refill encounter.             Passed - Medication is active on med list       Passed - Patient is age 18 or older       Passed - No active pregnancy on record       Passed - No positive pregnancy test in past 12 months        AMLODIPINE BESYLATE 10MG TABLETS  Last Written Prescription Date:  01/22/19  Last Fill Quantity: 30,  # refills: 0   Last office visit: 10/8/2018 with prescribing provider:  10/08/18   Future Office Visit:    Requested Prescriptions   Pending Prescriptions Disp Refills     QUEtiapine (SEROQUEL) 100 MG tablet [Pharmacy Med Name: " QUETIAPINE 100MG TABLETS] 90 tablet 0     Sig: TAKE 1 TABLET(100 MG) BY MOUTH AT BEDTIME    Antipsychotic Medications Passed - 2/15/2019  1:02 PM       Passed - Blood pressure under 140/90 in past 12 months    BP Readings from Last 3 Encounters:   10/08/18 120/66   10/02/18 136/61   08/30/18 112/76                Passed - Patient is 12 years of age or older       Passed - Lipid panel on file within the past 12 months    Recent Labs   Lab Test 08/30/18  0945  12/04/13  1300   CHOL 186   < > 188   TRIG 111   < > 121   HDL 61   < > 73   *   < > 91   NHDL 125   < >  --    VLDL  --   --  24   CHOLHDLRATIO  --   --  2.6    < > = values in this interval not displayed.              Passed - CBC on file in past 12 months    Recent Labs   Lab Test 10/01/18  0525   WBC 6.0   RBC 3.31*   HGB 10.1*   HCT 30.3*     Canceled, Test credited                Passed - Heart Rate on file within past 12 months    Pulse Readings from Last 3 Encounters:   10/08/18 104   10/02/18 61   08/30/18 80              Passed - A1c or Glucose on file in past 12 months    Recent Labs   Lab Test 10/01/18  0525  07/11/14  0410   *   < > 105*   A1C  --   --  5.8    < > = values in this interval not displayed.       Please review patients last 3 weights. If a weight gain of >10 lbs exists, you may refill the prescription once after instructing the patient to schedule an appointment within the next 30 days.    Wt Readings from Last 3 Encounters:   10/08/18 54.7 kg (120 lb 8 oz)   08/30/18 56.2 kg (124 lb)   01/26/18 53.8 kg (118 lb 8 oz)            Passed - Medication is active on med list       Passed - Patient is not pregnant       Passed - No positve pregnancy test on file in past 12 months       Passed - Recent (6 mo) or future (30 days) visit within the authorizing provider's specialty    Patient had office visit in the last 6 months or has a visit in the next 30 days with authorizing provider or within the authorizing provider's  "specialty.  See \"Patient Info\" tab in inbasket, or \"Choose Columns\" in Meds & Orders section of the refill encounter.            amLODIPine (NORVASC) 10 MG tablet [Pharmacy Med Name: AMLODIPINE BESYLATE 10MG TABLETS] 30 tablet 0     Sig: TAKE 1 TABLET(10 MG) BY MOUTH DAILY    Calcium Channel Blockers Protocol  Failed - 2/15/2019  1:02 PM       Failed - Normal serum creatinine on file in past 12 months    Recent Labs   Lab Test 10/01/18  0525  10/20/12  2031   CR 0.45*   < >  --    CREAT  --   --  0.6    < > = values in this interval not displayed.            Passed - Blood pressure under 140/90 in past 12 months    BP Readings from Last 3 Encounters:   10/08/18 120/66   10/02/18 136/61   08/30/18 112/76                Passed - Recent (12 mo) or future (30 days) visit within the authorizing provider's specialty    Patient had office visit in the last 12 months or has a visit in the next 30 days with authorizing provider or within the authorizing provider's specialty.  See \"Patient Info\" tab in inbasket, or \"Choose Columns\" in Meds & Orders section of the refill encounter.             Passed - Medication is active on med list       Passed - Patient is age 18 or older       Passed - No active pregnancy on record       Passed - No positive pregnancy test in past 12 months            "

## 2019-02-16 ENCOUNTER — TELEPHONE (OUTPATIENT)
Dept: FAMILY MEDICINE | Facility: CLINIC | Age: 59
End: 2019-02-16

## 2019-02-16 NOTE — LETTER
February 16, 2019    Joann Lisa  [address]   [facility] [address]    Dear Ruperto David cares about your health and your health plan.  I have reviewed your medical conditions, medication list and lab results, and am making recommendations based on this review to better manage your health.    You are in particular need of attention regarding:  -Depression/Anxiety  -High Blood Pressure  -Breast Cancer Screening    I am recommending that you:     -schedule a FOLLOWUP OFFICE APPOINTMENT with me.       -schedule a MAMMOGRAM which is due.    1 in 8 women will develop invasive breast cancer during her lifetime and it is the most common non-skin cancer in American women.  EARLY detection, new treatments, and a better understanding of the disease have increased survival rates - the 5 year survival rate in the 1960s was 63% and today it is close to 90%.    If you are under/uninsured, we recommend you contact the [facility]. They offer mammograms at no charge or on a sliding fee charge. You can schedule with them at [phone]. Please have them send us the results.      Please disregard this reminder if you have had this exam elsewhere within the last year.  It would be helpful for us to have a copy of your mammogram report in your file so that we can best coordinate your care - please contact us with when your test was done so we can update your record.               Please call us at the [place] location:  [phone] or use [employer] to address the above recommendations.     Thank you for trusting [facility].  We appreciate the opportunity to serve you and look forward to supporting your healthcare in the future.    If you have (or plan to have) any of these tests done at a facility other than a [facility] or a [facility], please have the results sent to the [facility] location noted above.      Best Regards,    Esau Ospina MD

## 2019-02-17 NOTE — TELEPHONE ENCOUNTER
Panel Management Review      Patient has the following on her problem list:     Depression / Dysthymia review    Measure:  Needs PHQ-9 score of 4 or less during index window.  Administer PHQ-9 and if score is 5 or more, send encounter to provider for next steps.    5 - 7 month window range:     PHQ-9 SCORE 10/17/2017 7/30/2018 8/30/2018   PHQ-9 Total Score - - -   PHQ-9 Total Score - - -   PHQ-9 Total Score 6 11 11       If PHQ-9 recheck is 5 or more, route to provider for next steps.    Patient is due for:  PHQ9    Hypertension   Last three blood pressure readings:  BP Readings from Last 3 Encounters:   10/08/18 120/66   10/02/18 136/61   08/30/18 112/76     Blood pressure: Passed    HTN Guidelines:  Age 18-59 BP range:  Less than 140/90  Age 60-85 with Diabetes:  Less than 140/90  Age 60-85 without Diabetes:  less than 150/90      Composite cancer screening  Chart review shows that this patient is due/due soon for the following Mammogram  Summary:    Patient is due/failing the following:   FOLLOW UP, MAMMOGRAM and PHQ9    Action needed:   Patient needs office visit for med check.    Type of outreach:    Sent letter.    Questions for provider review:    None                                                                                                                                    Princess PASTOR Solis CMA       Chart routed to none .

## 2019-02-18 RX ORDER — QUETIAPINE FUMARATE 100 MG/1
TABLET, FILM COATED ORAL
Qty: 60 TABLET | Refills: 0 | Status: SHIPPED | OUTPATIENT
Start: 2019-02-18 | End: 2019-02-18

## 2019-02-18 RX ORDER — AMLODIPINE BESYLATE 10 MG/1
TABLET ORAL
Qty: 30 TABLET | Refills: 0 | Status: SHIPPED | OUTPATIENT
Start: 2019-02-18 | End: 2019-02-18

## 2019-02-18 NOTE — TELEPHONE ENCOUNTER
Prescription approved per [facility] Refill Protocol for 6 months of refills since last appointment, which was 10/8/2018. 2 Month Supply given for Seroquel, due for OV at that time.     Routing refill request to provider for review/approval because:  Labs out of range:  Mika (Norvasc)

## 2019-05-13 ENCOUNTER — APPOINTMENT (OUTPATIENT)
Dept: GENERAL RADIOLOGY | Facility: CLINIC | Age: 59
End: 2019-05-13
Attending: EMERGENCY MEDICINE
Payer: MEDICARE

## 2019-05-13 ENCOUNTER — HOSPITAL ENCOUNTER (EMERGENCY)
Facility: CLINIC | Age: 59
Discharge: HOME | End: 2019-05-13
Attending: EMERGENCY MEDICINE | Admitting: EMERGENCY MEDICINE
Payer: MEDICARE

## 2019-05-13 VITALS
BODY MASS INDEX: 22.19 KG/M2 | DIASTOLIC BLOOD PRESSURE: 64 MMHG | TEMPERATURE: 98.2 F | SYSTOLIC BLOOD PRESSURE: 113 MMHG | WEIGHT: 113 LBS | HEIGHT: 60 IN | HEART RATE: 64 BPM | OXYGEN SATURATION: 96 % | RESPIRATION RATE: 11 BRPM

## 2019-05-13 DIAGNOSIS — R07.9 CHEST PAIN, UNSPECIFIED TYPE: ICD-10-CM

## 2019-05-13 LAB
ALBUMIN SERPL-MCNC: 3.5 G/DL (ref 3.4–5)
ALP SERPL-CCNC: 137 U/L (ref 40–150)
ALT SERPL W P-5'-P-CCNC: 19 U/L (ref 0–50)
ANION GAP SERPL CALCULATED.3IONS-SCNC: 5 MMOL/L (ref 3–14)
AST SERPL W P-5'-P-CCNC: 21 U/L (ref 0–45)
BASOPHILS # BLD AUTO: 0 10E9/L (ref 0–0.2)
BASOPHILS NFR BLD AUTO: 0.5 %
BILIRUB SERPL-MCNC: 0.2 MG/DL (ref 0.2–1.3)
BUN SERPL-MCNC: 8 MG/DL (ref 7–30)
CALCIUM SERPL-MCNC: 8.8 MG/DL (ref 8.5–10.1)
CHLORIDE SERPL-SCNC: 107 MMOL/L (ref 94–109)
CO2 SERPL-SCNC: 29 MMOL/L (ref 20–32)
CREAT SERPL-MCNC: 0.51 MG/DL (ref 0.52–1.04)
DIFFERENTIAL METHOD BLD: NORMAL
EOSINOPHIL # BLD AUTO: 0.1 10E9/L (ref 0–0.7)
EOSINOPHIL NFR BLD AUTO: 0.9 %
ERYTHROCYTE [DISTWIDTH] IN BLOOD BY AUTOMATED COUNT: 12.5 % (ref 10–15)
GFR SERPL CREATININE-BSD FRML MDRD: >90 ML/MIN/{1.73_M2}
GLUCOSE SERPL-MCNC: 78 MG/DL (ref 70–99)
HCT VFR BLD AUTO: 38.7 % (ref 35–47)
HGB BLD-MCNC: 13.8 G/DL (ref 11.7–15.7)
IMM GRANULOCYTES # BLD: 0 10E9/L (ref 0–0.4)
IMM GRANULOCYTES NFR BLD: 0.2 %
INTERPRETATION ECG - MUSE: NORMAL
LYMPHOCYTES # BLD AUTO: 1.7 10E9/L (ref 0.8–5.3)
LYMPHOCYTES NFR BLD AUTO: 25.4 %
MCH RBC QN AUTO: 31.7 PG (ref 26.5–33)
MCHC RBC AUTO-ENTMCNC: 35.7 G/DL (ref 31.5–36.5)
MCV RBC AUTO: 89 FL (ref 78–100)
MONOCYTES # BLD AUTO: 0.3 10E9/L (ref 0–1.3)
MONOCYTES NFR BLD AUTO: 4.6 %
NEUTROPHILS # BLD AUTO: 4.5 10E9/L (ref 1.6–8.3)
NEUTROPHILS NFR BLD AUTO: 68.4 %
NRBC # BLD AUTO: 0 10*3/UL
NRBC BLD AUTO-RTO: 0 /100
PLATELET # BLD AUTO: 203 10E9/L (ref 150–450)
POTASSIUM SERPL-SCNC: 3.7 MMOL/L (ref 3.4–5.3)
PROT SERPL-MCNC: 7 G/DL (ref 6.8–8.8)
RBC # BLD AUTO: 4.36 10E12/L (ref 3.8–5.2)
SODIUM SERPL-SCNC: 141 MMOL/L (ref 133–144)
TROPONIN I SERPL-MCNC: <0.015 UG/L (ref 0–0.04)
TROPONIN I SERPL-MCNC: <0.015 UG/L (ref 0–0.04)
WBC # BLD AUTO: 6.6 10E9/L (ref 4–11)

## 2019-05-13 PROCEDURE — 80053 COMPREHEN METABOLIC PANEL: CPT | Performed by: EMERGENCY MEDICINE

## 2019-05-13 PROCEDURE — 99285 EMERGENCY DEPT VISIT HI MDM: CPT | Mod: 25

## 2019-05-13 PROCEDURE — 85025 COMPLETE CBC W/AUTO DIFF WBC: CPT | Performed by: EMERGENCY MEDICINE

## 2019-05-13 PROCEDURE — 71046 X-RAY EXAM CHEST 2 VIEWS: CPT

## 2019-05-13 PROCEDURE — 84484 ASSAY OF TROPONIN QUANT: CPT | Mod: 91 | Performed by: EMERGENCY MEDICINE

## 2019-05-13 PROCEDURE — 93005 ELECTROCARDIOGRAM TRACING: CPT

## 2019-05-13 ASSESSMENT — ENCOUNTER SYMPTOMS
NAUSEA: 0
ABDOMINAL PAIN: 0
SHORTNESS OF BREATH: 0
BLOOD IN STOOL: 0
FEVER: 0
VOMITING: 0
CHILLS: 0

## 2019-05-13 ASSESSMENT — MIFFLIN-ST. JEOR: SCORE: 1004.06

## 2019-05-13 NOTE — ED TRIAGE NOTES
Patient woke up this morning with pain in the center of her chest and traveled under her armpit and up her neck. Patient states that at its worst the pain was a 7 out of 10. Patient describes the pain as a burning hot sensation.

## 2019-05-13 NOTE — ED PROVIDER NOTES
History     Chief Complaint:  Chest Pain    HPI   Joann Gonzalez is a 58 year old female with a history of stroke on Plavix who presents with chest pain. Early this morning, the patient reports waking up from sleep with a burning sensation in the left side of her chest. She notes she was able to fall back asleep without difficulty, but then woke up again around 0530 with the same sensation, though this time involving radiation into her left axilla. As she had never experienced this before, the patient states she decided to come to the ED for further evaluation. Here, she reports she is now asymptomatic. The patient notes the episodes lasted approximately 30 seconds each. She denies any shortness of breath, abdominal pain, black or bloody stools, or other acute symptoms. She denies any history of reflux or heartburn, recent exertional dyspnea, recent increased stress or exercise, increased ibuprofen use, or other recent changes or relevant history.     CARDIAC RISK FACTORS:  Sex:    Female  Tobacco:   Former smoker, quit 2018  Hypertension:   Yes  Hyperlipidemia:  No  Diabetes:   No  Family History:  No    PE/DVT RISK FACTORS:  Sex:    Female  Hormones:   No  Tobacco:   Former smoker, quit 2018  Cancer:   No  Travel:   No  Surgery:   No  Other immobilization: No  Personal history:  No  Family history:  No    Allergies:  Advil  Aspirin  Nsaids  Plaquenil    Medications:    Amlodipine  Plavix  Prozac  Lisinopril  Miralax  Seroquel    Past Medical History:    Depression  Hypertension  Migraines  Cerebral artery occlusion  Gastroesophageal reflux disease  Chronic low back pain    Past Surgical History:    Hysterectomy with appendectomy  Back surgery x3  Cholecystectomy  Gastric bypass  Laparoscopic herniorrhaphy internal  Laparoscopic lysis adhesions    Family History:    Alzheimer disease  Hypertension  Endocrine disease    Social History:  Smoking status: Former smoker, quit 1/1/2018  Alcohol use: Previous use -  sober x3 years  Drug use: No  PCP: Esau Ospina  Marital Status:  [2]     Review of Systems   Constitutional: Negative for chills and fever.   Respiratory: Negative for shortness of breath.    Cardiovascular: Positive for chest pain.   Gastrointestinal: Negative for abdominal pain, blood in stool, nausea and vomiting.   All other systems reviewed and are negative.    Physical Exam     Patient Vitals for the past 24 hrs:   BP Temp Temp src Pulse Heart Rate Resp SpO2 Height Weight   05/13/19 0930 100/53 -- -- 66 64 12 -- -- --   05/13/19 0600 140/80 -- -- 81 -- -- -- -- --   05/13/19 0559 140/80 98.2  F (36.8  C) Oral -- 84 -- 96 % 1.524 m (5') 51.3 kg (113 lb)     Physical Exam  GENERAL: well developed, pleasant  HEAD: atraumatic  EYES: pupils reactive, extraocular muscles intact, conjunctivae normal  ENT:  mucus membranes moist  NECK:  trachea midline, normal range of motion  RESPIRATORY: no tachypnea, breath sounds clear to auscultation   CVS: normal S1/S2, no murmurs, intact distal pulses  ABDOMEN: soft, nontender, nondistention  MUSCULOSKELETAL: no deformities  SKIN: warm and dry, no acute rashes or ulceration  NEURO: GCS 15, cranial nerves intact, alert and oriented x3  PSYCH:  Mood/affect normal    Emergency Department Course   ECG (05:55:51):  Rate 76 bpm. SD interval 136. QRS duration 84. QT/QTc 408/459. P-R-T axes 63 19 24. Normal sinus rhythm. Normal ECG Interpreted at 0659 by Arvind Price MD.    Imaging:  Radiographic findings were communicated with the patient who voiced understanding of the findings.    XR Chest 2 views:  No acute cardiopulmonary abnormality.  As read by Radiology.    Laboratory:  CBC: WNL (WBC 6.6, HGB 13.8, )  CMP: Creatinine 0.51 (L), o/w WNL  Troponin (0637): <0.015  Troponin (0905): <0.015    Emergency Department Course:  Past medical records, nursing notes, and vitals reviewed.  0659: I performed an exam of the patient and obtained history, as documented  above.  ECG performed, results above.  IV inserted and blood drawn. The patient was placed on continuous cardiac monitoring and pulse oximetry.  The patient was sent for a chest x-ray while in the emergency department, findings above.    0809: I rechecked the patient. Findings explained to the patient. We will send a second troponin, findings above.    0958: I rechecked the patient. Findings and plan explained to the patient. Patient discharged home with instructions regarding supportive care, medications, and reasons to return. The importance of close follow-up was reviewed.     Impression & Plan    Medical Decision Making:  Joann Gonzalez is a 58 year old female who presents to the ED for evaluation of chest pain. She notes waking up with an epigastric burning sensation and then falling back asleep and then having another episode of burning that wrapped around to the left side. Symptoms lasted about 30 seconds and were not exertional. She admits that she does not exert herself very much, but has not had any chest pain or shortness of breath with walking or going up and down a flight of stairs. The patient's risk factors are mainly high blood pressure. Her HEART score is low and with a normal EKG and 2 normal troponins, she looks safe for discharge. We will get her set up for a stress test and have her follow-up with her primary care doctor. I did consider broad differential for her chest pain certainly GERD versus cardiac disease seem to be most likely. She does have a history of gastric bypass as well as prior stroke and is on Plavix for this.    Diagnosis:    ICD-10-CM    1. Chest pain, unspecified type R07.9 Echo Stress Echocardiogram     Disposition: Discharged to home    Discharge Medications: None    Desiree Araujo  5/13/2019    EMERGENCY DEPARTMENT    Desiree GILLIAM am serving as a scribe at 6:59 AM on 5/13/2019 to document services personally performed by Arvind Price MD based on my observations and  the provider's statements to me.      Arvind Price MD  05/17/19 1178

## 2019-05-13 NOTE — ED AVS SNAPSHOT
Emergency Department  [address] [place] [address]  Phone:  [phone]  Fax:  [phone]                                    Joann Gonzalez   MRN: [id number]    Department:   Emergency Department   Date of Visit:  5/13/2019           After Visit Summary Signature Page    I have received my discharge instructions, and my questions have been answered. I have discussed any challenges I see with this plan with the nurse or doctor.    ..........................................................................................................................................  Patient/Patient Representative Signature      ..........................................................................................................................................  Patient Representative Print Name and Relationship to Patient    ..................................................               ................................................  Date                                   Time    ..........................................................................................................................................  Reviewed by Signature/Title    ...................................................              ..............................................  Date                                               Time          22EPIC Rev 08/18

## 2019-05-20 DIAGNOSIS — F32.5 MAJOR DEPRESSION IN COMPLETE REMISSION (H): ICD-10-CM

## 2019-05-21 NOTE — TELEPHONE ENCOUNTER
"QUETIAPINE 100MG TABLETS  Last Written Prescription Date:  03/06/2019  Last Fill Quantity: 90,  # refills: 1   Last office visit: 10/8/2018 with prescribing provider:  10/08/2018   Future Office Visit:    Requested Prescriptions   Pending Prescriptions Disp Refills     QUEtiapine (SEROQUEL) 100 MG tablet [Pharmacy Med Name: QUETIAPINE 100MG TABLETS] 90 tablet 0     Sig: TAKE 1 TABLET BY MOUTH AT BEDTIME       Antipsychotic Medications Failed - 5/20/2019  6:31 PM        Failed - Recent (6 mo) or future (30 days) visit within the authorizing provider's specialty     Patient had office visit in the last 6 months or has a visit in the next 30 days with authorizing provider or within the authorizing provider's specialty.  See \"Patient Info\" tab in inbasket, or \"Choose Columns\" in Meds & Orders section of the refill encounter.            Passed - Blood pressure under 140/90 in past 12 months     BP Readings from Last 3 Encounters:   05/13/19 113/64   10/08/18 120/66   10/02/18 136/61                 Passed - Patient is 12 years of age or older        Passed - Lipid panel on file within the past 12 months     Recent Labs   Lab Test 08/30/18  0945  12/04/13  1300   CHOL 186   < > 188   TRIG 111   < > 121   HDL 61   < > 73   *   < > 91   NHDL 125   < >  --    VLDL  --   --  24   CHOLHDLRATIO  --   --  2.6    < > = values in this interval not displayed.               Passed - CBC on file in past 12 months     Recent Labs   Lab Test 05/13/19  0637   WBC 6.6   RBC 4.36   HGB 13.8   HCT 38.7                    Passed - Heart Rate on file within past 12 months     Pulse Readings from Last 3 Encounters:   05/13/19 64   10/08/18 104   10/02/18 61               Passed - A1c or Glucose on file in past 12 months     Recent Labs   Lab Test 05/13/19  0637  07/11/14  0410   GLC 78   < > 105*   A1C  --   --  5.8    < > = values in this interval not displayed.       Please review patients last 3 weights. If a weight gain of " >10 lbs exists, you may refill the prescription once after instructing the patient to schedule an appointment within the next 30 days.    Wt Readings from Last 3 Encounters:   05/13/19 51.3 kg (113 lb)   10/08/18 54.7 kg (120 lb 8 oz)   08/30/18 56.2 kg (124 lb)             Passed - Medication is active on med list        Passed - Patient is not pregnant        Passed - No positve pregnancy test on file in past 12 months

## 2019-05-23 DIAGNOSIS — F32.5 MAJOR DEPRESSION IN COMPLETE REMISSION (H): ICD-10-CM

## 2019-05-23 RX ORDER — QUETIAPINE FUMARATE 100 MG/1
TABLET, FILM COATED ORAL
Qty: 30 TABLET | Refills: 0 | Status: SHIPPED | OUTPATIENT
Start: 2019-05-23 | End: 2019-06-24

## 2019-05-23 NOTE — TELEPHONE ENCOUNTER
Medication is being filled for 1 time refill only due to:  Patient needs to be seen because Been over 6 months since last OV.

## 2019-05-28 RX ORDER — QUETIAPINE FUMARATE 100 MG/1
TABLET, FILM COATED ORAL
Qty: 90 TABLET | Refills: 0 | OUTPATIENT
Start: 2019-05-28

## 2019-05-28 NOTE — TELEPHONE ENCOUNTER
"QUETIAPINE 100MG TABLETS  Last Written Prescription Date:  05/23/2019  Last Fill Quantity: 30,  # refills: 0   Last office visit: 10/8/2018 with prescribing provider:  10/08/2018   Future Office Visit:    Requested Prescriptions   Pending Prescriptions Disp Refills     QUEtiapine (SEROQUEL) 100 MG tablet [Pharmacy Med Name: QUETIAPINE 100MG TABLETS] 90 tablet 0     Sig: TAKE 1 TABLET BY MOUTH EVERY NIGHT AT BEDTIME       Antipsychotic Medications Failed - 5/23/2019  9:24 AM        Failed - Recent (6 mo) or future (30 days) visit within the authorizing provider's specialty     Patient had office visit in the last 6 months or has a visit in the next 30 days with authorizing provider or within the authorizing provider's specialty.  See \"Patient Info\" tab in inbasket, or \"Choose Columns\" in Meds & Orders section of the refill encounter.            Passed - Blood pressure under 140/90 in past 12 months     BP Readings from Last 3 Encounters:   05/13/19 113/64   10/08/18 120/66   10/02/18 136/61                 Passed - Patient is 12 years of age or older        Passed - Lipid panel on file within the past 12 months     Recent Labs   Lab Test 08/30/18  0945  12/04/13  1300   CHOL 186   < > 188   TRIG 111   < > 121   HDL 61   < > 73   *   < > 91   NHDL 125   < >  --    VLDL  --   --  24   CHOLHDLRATIO  --   --  2.6    < > = values in this interval not displayed.               Passed - CBC on file in past 12 months     Recent Labs   Lab Test 05/13/19  0637   WBC 6.6   RBC 4.36   HGB 13.8   HCT 38.7                    Passed - Heart Rate on file within past 12 months     Pulse Readings from Last 3 Encounters:   05/13/19 64   10/08/18 104   10/02/18 61               Passed - A1c or Glucose on file in past 12 months     Recent Labs   Lab Test 05/13/19  0637  07/11/14  0410   GLC 78   < > 105*   A1C  --   --  5.8    < > = values in this interval not displayed.       Please review patients last 3 weights. If a " weight gain of >10 lbs exists, you may refill the prescription once after instructing the patient to schedule an appointment within the next 30 days.    Wt Readings from Last 3 Encounters:   05/13/19 51.3 kg (113 lb)   10/08/18 54.7 kg (120 lb 8 oz)   08/30/18 56.2 kg (124 lb)             Passed - Medication is active on med list        Passed - Patient is not pregnant        Passed - No positve pregnancy test on file in past 12 months

## 2019-05-30 DIAGNOSIS — G45.9 TRANSIENT CEREBRAL ISCHEMIA, UNSPECIFIED TYPE: ICD-10-CM

## 2019-05-30 NOTE — TELEPHONE ENCOUNTER
"Requested Prescriptions   Pending Prescriptions Disp Refills     clopidogrel (PLAVIX) 75 MG tablet [Pharmacy Med Name: CLOPIDOGREL 75MG TABLETS]  Last Written Prescription Date:  2/25/2019  Last Fill Quantity: 90 tablet,  # refills: 0   Last office visit: 10/8/2018 with prescribing provider:  Anai   Future Office Visit:     90 tablet 0     Sig: TAKE 1 TABLET(75 MG) BY MOUTH DAILY       Plavix Passed - 5/30/2019  1:47 PM        Passed - No active PPI on record unless is Protonix        Passed - Normal HGB on file in past 12 months     Recent Labs   Lab Test 05/13/19  0637   HGB 13.8               Passed - Normal Platelets on file in past 12 months     Recent Labs   Lab Test 05/13/19  0637                  Passed - Recent (12 mo) or future (30 days) visit within the authorizing provider's specialty     Patient had office visit in the last 12 months or has a visit in the next 30 days with authorizing provider or within the authorizing provider's specialty.  See \"Patient Info\" tab in inbasket, or \"Choose Columns\" in Meds & Orders section of the refill encounter.              Passed - Medication is active on med list        Passed - Patient is age 18 or older        Passed - No active pregnancy on record        Passed - No positive pregnancy test in past 12 months           "

## 2019-05-31 RX ORDER — CLOPIDOGREL BISULFATE 75 MG/1
TABLET ORAL
Qty: 90 TABLET | Refills: 0 | Status: SHIPPED | OUTPATIENT
Start: 2019-05-31 | End: 2019-08-28

## 2019-06-24 DIAGNOSIS — F32.5 MAJOR DEPRESSION IN COMPLETE REMISSION (H): ICD-10-CM

## 2019-06-24 NOTE — TELEPHONE ENCOUNTER
"Requested Prescriptions   Pending Prescriptions Disp Refills     QUEtiapine (SEROQUEL) 100 MG tablet [Pharmacy Med Name: QUETIAPINE 100MG TABLETS] 30 tablet 0     Sig: TAKE 1 TABLET BY MOUTH EVERY NIGHT AT BEDTIME   Last Written Prescription Date:  3/6/19  Last Fill Quantity: 90,  # refills: 1   Last office visit: 10/8/2018 with prescribing provider:     Future Office Visit:        Antipsychotic Medications Failed - 6/24/2019  1:12 PM        Failed - Recent (6 mo) or future (30 days) visit within the authorizing provider's specialty     Patient had office visit in the last 6 months or has a visit in the next 30 days with authorizing provider or within the authorizing provider's specialty.  See \"Patient Info\" tab in inbasket, or \"Choose Columns\" in Meds & Orders section of the refill encounter.            Passed - Blood pressure under 140/90 in past 12 months     BP Readings from Last 3 Encounters:   05/13/19 113/64   10/08/18 120/66   10/02/18 136/61                 Passed - Patient is 12 years of age or older        Passed - Lipid panel on file within the past 12 months     Recent Labs   Lab Test 08/30/18  0945  12/04/13  1300   CHOL 186   < > 188   TRIG 111   < > 121   HDL 61   < > 73   *   < > 91   NHDL 125   < >  --    VLDL  --   --  24   CHOLHDLRATIO  --   --  2.6    < > = values in this interval not displayed.               Passed - CBC on file in past 12 months     Recent Labs   Lab Test 05/13/19  0637   WBC 6.6   RBC 4.36   HGB 13.8   HCT 38.7                    Passed - Heart Rate on file within past 12 months     Pulse Readings from Last 3 Encounters:   05/13/19 64   10/08/18 104   10/02/18 61               Passed - A1c or Glucose on file in past 12 months     Recent Labs   Lab Test 05/13/19  0637  07/11/14  0410   GLC 78   < > 105*   A1C  --   --  5.8    < > = values in this interval not displayed.       Please review patients last 3 weights. If a weight gain of >10 lbs exists, you may " refill the prescription once after instructing the patient to schedule an appointment within the next 30 days.    Wt Readings from Last 3 Encounters:   05/13/19 51.3 kg (113 lb)   10/08/18 54.7 kg (120 lb 8 oz)   08/30/18 56.2 kg (124 lb)             Passed - Medication is active on med list        Passed - Patient is not pregnant        Passed - No positve pregnancy test on file in past 12 months

## 2019-06-27 NOTE — TELEPHONE ENCOUNTER
Called patient and family member took a message for patient to schedule an appointment.       Qian x 1 already given. Needs OV for further refills.

## 2019-06-28 RX ORDER — QUETIAPINE FUMARATE 100 MG/1
TABLET, FILM COATED ORAL
Qty: 30 TABLET | Refills: 0 | Status: SHIPPED | OUTPATIENT
Start: 2019-06-28 | End: 2019-07-23

## 2019-06-28 NOTE — TELEPHONE ENCOUNTER
I reached Joann and Dr Jordan does not have openings today so I scheduled for Monday.      Joann said she will run out of [employer] on Saturday.  She only has enough for today.    Dr Jordan,    Would you like to fill this for her? Or work her in today?    Adrianne Irving RN- Triage [employer]

## 2019-07-01 ENCOUNTER — OFFICE VISIT (OUTPATIENT)
Dept: FAMILY MEDICINE | Facility: CLINIC | Age: 59
End: 2019-07-01
Payer: MEDICARE

## 2019-07-01 VITALS
SYSTOLIC BLOOD PRESSURE: 100 MMHG | HEIGHT: 60 IN | BODY MASS INDEX: 22.19 KG/M2 | TEMPERATURE: 98.5 F | DIASTOLIC BLOOD PRESSURE: 62 MMHG | WEIGHT: 113 LBS | RESPIRATION RATE: 12 BRPM | OXYGEN SATURATION: 98 % | HEART RATE: 84 BPM

## 2019-07-01 DIAGNOSIS — F41.9 ANXIETY: ICD-10-CM

## 2019-07-01 DIAGNOSIS — M96.1 POSTLAMINECTOMY SYNDROME, LUMBAR REGION: Primary | ICD-10-CM

## 2019-07-01 DIAGNOSIS — I10 BENIGN ESSENTIAL HYPERTENSION: ICD-10-CM

## 2019-07-01 DIAGNOSIS — Z12.31 ENCOUNTER FOR SCREENING MAMMOGRAM FOR BREAST CANCER: ICD-10-CM

## 2019-07-01 DIAGNOSIS — F32.5 MAJOR DEPRESSION IN COMPLETE REMISSION (H): ICD-10-CM

## 2019-07-01 PROCEDURE — 99214 OFFICE O/P EST MOD 30 MIN: CPT | Performed by: FAMILY MEDICINE

## 2019-07-01 RX ORDER — QUETIAPINE FUMARATE 25 MG/1
25 TABLET, FILM COATED ORAL 2 TIMES DAILY
Qty: 60 TABLET | Refills: 3 | Status: SHIPPED | OUTPATIENT
Start: 2019-07-01 | End: 2019-07-01

## 2019-07-01 ASSESSMENT — ANXIETY QUESTIONNAIRES
1. FEELING NERVOUS, ANXIOUS, OR ON EDGE: MORE THAN HALF THE DAYS
3. WORRYING TOO MUCH ABOUT DIFFERENT THINGS: MORE THAN HALF THE DAYS
6. BECOMING EASILY ANNOYED OR IRRITABLE: MORE THAN HALF THE DAYS
2. NOT BEING ABLE TO STOP OR CONTROL WORRYING: MORE THAN HALF THE DAYS
GAD7 TOTAL SCORE: 14
5. BEING SO RESTLESS THAT IT IS HARD TO SIT STILL: MORE THAN HALF THE DAYS
IF YOU CHECKED OFF ANY PROBLEMS ON THIS QUESTIONNAIRE, HOW DIFFICULT HAVE THESE PROBLEMS MADE IT FOR YOU TO DO YOUR WORK, TAKE CARE OF THINGS AT HOME, OR GET ALONG WITH OTHER PEOPLE: SOMEWHAT DIFFICULT
7. FEELING AFRAID AS IF SOMETHING AWFUL MIGHT HAPPEN: MORE THAN HALF THE DAYS

## 2019-07-01 ASSESSMENT — PATIENT HEALTH QUESTIONNAIRE - PHQ9
5. POOR APPETITE OR OVEREATING: MORE THAN HALF THE DAYS
SUM OF ALL RESPONSES TO PHQ QUESTIONS 1-9: 8

## 2019-07-01 ASSESSMENT — MIFFLIN-ST. JEOR: SCORE: 1004.06

## 2019-07-01 NOTE — PATIENT INSTRUCTIONS
Increase the Quetiapine 25 mg to full tablet in AM and at night  Continue the Quetiapine 100 mg at night

## 2019-07-01 NOTE — PROGRESS NOTES
Subjective     Joann Gonzalez is a 58 year old female who presents to clinic today for the following health issues:    HPI     Hypertension Follow-up      Do you check your blood pressure regularly outside of the clinic? No     Are you following a low salt diet? No    Are your blood pressures ever more than 140 on the top number (systolic) OR more   than 90 on the bottom number (diastolic), for example 140/90? NA     Cerebrovascular Follow-up      Patient history: ischemic cerebrovascular incident    Residual symptoms: None    Worsened or new symptoms since last visit: No    Daily aspirin use: No    Hypertension controlled: Yes    Depression and Anxiety Follow-Up    How are you doing with your depression since your last visit? Stable    How are you doing with your anxiety since your last visit?  Worsened    Are you having other symptoms that might be associated with depression or anxiety? Yes:  anxiety comes out of nowhere.    Have you had a significant life event? No     Do you have any concerns with your use of alcohol or other drugs? 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     Drug use: No     PHQ 7/30/2018 8/30/2018 7/1/2019   PHQ-9 Total Score 11 11 8   Q9: Thoughts of better off dead/self-harm past 2 weeks Not at all Not at all Not at all     PAU-7 SCORE 7/31/2017 8/30/2018 7/1/2019   Total Score - - -   Total Score 5 5 14   Total Score - - -     No flowsheet data found.  No flowsheet data found.  In the past two weeks have you had thoughts of suicide or self-harm?  No.    Do you have concerns about your personal safety or the safety of others?   No    Suicide Assessment Five-step Evaluation and Treatment (SAFE-T)        BP Readings from Last 3 Encounters:    07/01/19 100/62   05/13/19 113/64   10/08/18 120/66    Wt Readings from Last 3 Encounters:   07/01/19 51.3 kg (113 lb)   05/13/19 51.3 kg (113 lb)   10/08/18 54.7 kg (120 lb 8 oz)                      Reviewed and updated as needed this visit by Provider  Tobacco  Allergies  Meds  Problems  Med Hx  Surg Hx  Fam Hx         Review of Systems   ROS COMP: CONSTITUTIONAL: NEGATIVE for fever, chills, change in weight  ENT/MOUTH: NEGATIVE for ear, mouth and throat problems  RESP: NEGATIVE for significant cough or SOB  CV: NEGATIVE for chest pain, palpitations or peripheral edema  PSYCHIATRIC: POSITIVE foranxiety, Hx anxiety and Hx depression      Objective    /62 (BP Location: Right arm, Patient Position: Sitting, Cuff Size: Adult Regular)   Pulse 84   Temp 98.5  F (36.9  C) (Tympanic)   Resp 12   Ht 1.524 m (5')   Wt 51.3 kg (113 lb)   SpO2 98%   BMI 22.07 kg/m    Body mass index is 22.07 kg/m .  Physical Exam   GENERAL: healthy, alert and no distress  NECK: no adenopathy, no asymmetry, masses, or scars and thyroid normal to palpation  RESP: lungs clear to auscultation - no rales, rhonchi or wheezes  CV: regular rate and rhythm, normal S1 S2, no S3 or S4, no murmur, click or rub, no peripheral edema and peripheral pulses strong  ABDOMEN: soft, nontender, no hepatosplenomegaly, no masses and bowel sounds normal  MS: no gross musculoskeletal defects noted, no edema  PSYCH: mentation appears normal, affect flat and anxious    Diagnostic Test Results:  Labs reviewed in Epic  none         Assessment & Plan     Joann was seen today for recheck medication, hypertension and mood changes.    Diagnoses and all orders for this visit:    Postlaminectomy syndrome, lumbar region    Anxiety    Major depression in complete remission (HCC) on meds   -     QUEtiapine (SEROQUEL) 25 MG tablet; Take 1 tablet (25 mg) by mouth 2 times daily    Encounter for screening mammogram for breast cancer  -     MA Screening  Digital Bilateral; Future    Benign essential hypertension           FUTURE APPOINTMENTS:       - Follow-up visit in 2 mo  Patient Instructions   Increase the Quetiapine 25 mg to full tablet in AM and at night  Continue the Quetiapine 100 mg at night      Return in about 2 months (around 9/1/2019) for Anxiety, Depression, Hypertension.    Esau Ospina MD  [facility]

## 2019-07-02 ASSESSMENT — ANXIETY QUESTIONNAIRES: GAD7 TOTAL SCORE: 14

## 2019-07-12 ENCOUNTER — OFFICE VISIT (OUTPATIENT)
Dept: FAMILY MEDICINE | Facility: CLINIC | Age: 59
End: 2019-07-12
Payer: MEDICARE

## 2019-07-12 VITALS
WEIGHT: 112 LBS | HEIGHT: 60 IN | RESPIRATION RATE: 12 BRPM | DIASTOLIC BLOOD PRESSURE: 60 MMHG | HEART RATE: 72 BPM | OXYGEN SATURATION: 98 % | TEMPERATURE: 97.3 F | BODY MASS INDEX: 21.99 KG/M2 | SYSTOLIC BLOOD PRESSURE: 120 MMHG

## 2019-07-12 DIAGNOSIS — R30.0 DYSURIA: Primary | ICD-10-CM

## 2019-07-12 DIAGNOSIS — R31.9 HEMATURIA, UNSPECIFIED TYPE: ICD-10-CM

## 2019-07-12 LAB
ALBUMIN UR-MCNC: NEGATIVE MG/DL
APPEARANCE UR: CLEAR
BILIRUB UR QL STRIP: NEGATIVE
COLOR UR AUTO: YELLOW
GLUCOSE UR STRIP-MCNC: NEGATIVE MG/DL
HGB UR QL STRIP: ABNORMAL
KETONES UR STRIP-MCNC: NEGATIVE MG/DL
LEUKOCYTE ESTERASE UR QL STRIP: ABNORMAL
NITRATE UR QL: NEGATIVE
PH UR STRIP: 5.5 PH (ref 5–7)
RBC #/AREA URNS AUTO: ABNORMAL /HPF
SOURCE: ABNORMAL
SP GR UR STRIP: <=1.005 (ref 1–1.03)
UROBILINOGEN UR STRIP-ACNC: 0.2 EU/DL (ref 0.2–1)
WBC #/AREA URNS AUTO: ABNORMAL /HPF

## 2019-07-12 PROCEDURE — 87186 SC STD MICRODIL/AGAR DIL: CPT | Performed by: FAMILY MEDICINE

## 2019-07-12 PROCEDURE — 81001 URINALYSIS AUTO W/SCOPE: CPT | Performed by: FAMILY MEDICINE

## 2019-07-12 PROCEDURE — 99214 OFFICE O/P EST MOD 30 MIN: CPT | Performed by: FAMILY MEDICINE

## 2019-07-12 PROCEDURE — 87088 URINE BACTERIA CULTURE: CPT | Performed by: FAMILY MEDICINE

## 2019-07-12 PROCEDURE — 87086 URINE CULTURE/COLONY COUNT: CPT | Performed by: FAMILY MEDICINE

## 2019-07-12 RX ORDER — CIPROFLOXACIN 500 MG/1
500 TABLET, FILM COATED ORAL 2 TIMES DAILY
Qty: 10 TABLET | Refills: 0 | Status: SHIPPED | OUTPATIENT
Start: 2019-07-12 | End: 2019-07-17

## 2019-07-12 ASSESSMENT — ANXIETY QUESTIONNAIRES
1. FEELING NERVOUS, ANXIOUS, OR ON EDGE: SEVERAL DAYS
2. NOT BEING ABLE TO STOP OR CONTROL WORRYING: SEVERAL DAYS
5. BEING SO RESTLESS THAT IT IS HARD TO SIT STILL: SEVERAL DAYS
IF YOU CHECKED OFF ANY PROBLEMS ON THIS QUESTIONNAIRE, HOW DIFFICULT HAVE THESE PROBLEMS MADE IT FOR YOU TO DO YOUR WORK, TAKE CARE OF THINGS AT HOME, OR GET ALONG WITH OTHER PEOPLE: SOMEWHAT DIFFICULT
7. FEELING AFRAID AS IF SOMETHING AWFUL MIGHT HAPPEN: MORE THAN HALF THE DAYS
GAD7 TOTAL SCORE: 11
3. WORRYING TOO MUCH ABOUT DIFFERENT THINGS: MORE THAN HALF THE DAYS
6. BECOMING EASILY ANNOYED OR IRRITABLE: MORE THAN HALF THE DAYS

## 2019-07-12 ASSESSMENT — MIFFLIN-ST. JEOR: SCORE: 999.53

## 2019-07-12 ASSESSMENT — PATIENT HEALTH QUESTIONNAIRE - PHQ9
5. POOR APPETITE OR OVEREATING: MORE THAN HALF THE DAYS
SUM OF ALL RESPONSES TO PHQ QUESTIONS 1-9: 6

## 2019-07-12 NOTE — LETTER
July 15, 2019      Joann Gonzalez  [address]   [facility] [address]        Dear ,    We are writing to inform you of your test results.    Your urine culture confirms that you have a bladder infection and that you are on the correct antibiotic to cover this type of bacteria (E-Coli).  Please finish that antibiotic and drink plenty of water to stay hydrated.    Resulted Orders   UA with Microscopic reflex to Culture   Result Value Ref Range    Color Urine Yellow     Appearance Urine Clear     Glucose Urine Negative NEG^Negative mg/dL    Bilirubin Urine Negative NEG^Negative    Ketones Urine Negative NEG^Negative mg/dL    Specific Gravity Urine <=1.005 1.003 - 1.035    pH Urine 5.5 5.0 - 7.0 pH    Protein Albumin Urine Negative NEG^Negative mg/dL    Urobilinogen Urine 0.2 0.2 - 1.0 EU/dL    Nitrite Urine Negative NEG^Negative    Blood Urine Moderate (A) NEG^Negative    Leukocyte Esterase Urine Small (A) NEG^Negative    Source Midstream Urine     WBC Urine 0 - 5 OTO5^0 - 5 /HPF    RBC Urine O - 2 OTO2^O - 2 /HPF   Urine Culture Aerobic Bacterial   Result Value Ref Range    Specimen Description Midstream Urine     Culture Micro 10,000 to 50,000 colonies/mL  Escherichia coli   (A)        If you have any questions or concerns, please call the clinic at the number listed above.       Sincerely,        Isha Mak, DO

## 2019-07-12 NOTE — PATIENT INSTRUCTIONS
"  Patient Education     Bladder Infection, Female (Adult)    Urine is normally doesn't have any bacteria in it. But bacteria can get into the urinary tract from the skin around the rectum. Or they can travel in the blood from elsewhere in the body. Once they are in your urinary tract, they can cause infection in the urethra (urethritis), the bladder (cystitis), or the kidneys (pyelonephritis).  The most common place for an infection is in the bladder. This is called a bladder infection. This is one of the most common infections in women. Most bladder infections are easily treated. They are not serious unless the infection spreads to the kidney.  The phrases \"bladder infection,\" \"UTI,\" and \"cystitis\" are often used to describe the same thing. But they are not always the same. Cystitis is an inflammation of the bladder. The most common cause of cystitis is an infection.  Symptoms  The infection causes inflammation in the urethra and bladder. This causes many of the symptoms. The most common symptoms of a bladder infection are:    Pain or burning when urinating    Having to urinate more often than usual    Urgent need to urinate    Only a small amount of urine comes out    Blood in urine    Abdominal discomfort. This is usually in the lower abdomen above the pubic bone.    Cloudy urine    Strong- or bad-smelling urine    Unable to urinate (urinary retention)    Unable to hold urine in (urinary incontinence)    Fever    Loss of appetite    Confusion (in older adults)  Causes  Bladder infections are not contagious. You can't get one from someone else, from a toilet seat, or from sharing a bath.  The most common cause of bladder infections is bacteria from the bowels. The bacteria get onto the skin around the opening of the urethra. From there, they can get into the urine and travel up to the bladder, causing inflammation and infection. This usually happens because of:    Wiping improperly after urinating. Always wipe " from front to back.    Bowel incontinence    Pregnancy    Procedures such as having a catheter inserted    Older age    Not emptying your bladder. This can allow bacteria a chance to grow in your urine.    Dehydration    Constipation    Sex    Use of a diaphragm for birth control   Treatment  Bladder infections are diagnosed by a urine test. They are treated with antibiotics and usually clear up quickly without complications. Treatment helps prevent a more serious kidney infection.  Medicines  Medicines can help in the treatment of a bladder infection:    Take antibiotics until they are used up, even if you feel better. It is important to finish them to make sure the infection has cleared.    You can use acetaminophen or ibuprofen for pain, fever, or discomfort, unless another medicine was prescribed. If you have chronic liver or kidney disease, talk with your healthcare provider before using these medicines. Also talk with your provider if you've ever had a stomach ulcer or gastrointestinal bleeding, or are taking blood-thinner medicines.    If you are given phenazopydridine to reduce burning with urination, it will cause your urine to become a bright orange color. This can stain clothing.  Care and prevention  These self-care steps can help prevent future infections:    Drink plenty of fluids to prevent dehydration and flush out your bladder. Do this unless you must restrict fluids for other health reasons, or your doctor told you not to.    Proper cleaning after going to the bathroom is important. Wipe from front to back after using the toilet to prevent the spread of bacteria.    Urinate more often. Don't try to hold urine in for a long time.    Wear loose-fitting clothes and cotton underwear. Avoid tight-fitting pants.    Improve your diet and prevent constipation. Eat more fresh fruit and vegetables, and fiber, and less junk and fatty foods.    Avoid sex until your symptoms are gone.    Avoid caffeine,  alcohol, and spicy foods. These can irritate your bladder.    Urinate right after intercourse to flush out your bladder.    If you use birth control pills and have frequent bladder infections, discuss it with your doctor.  Follow-up care  Call your healthcare provider if all symptoms are not gone after 3 days of treatment. This is especially important if you have repeat infections.  If a culture was done, you will be told if your treatment needs to be changed. If directed, you can call to find out the results.  If X-rays were done, you will be told if the results will affect your treatment.  Call 911  Call 911 if any of the following occur:    Trouble breathing    Hard to wake up or confusion    Fainting or loss of consciousness    Rapid heart rate  When to seek medical advice  Call your healthcare provider right away if any of these occur:    Fever of 100.4 F (38.0 C) or higher, or as directed by your healthcare provider    Symptoms are not better by the third day of treatment    Back or belly (abdominal) pain that gets worse    Repeated vomiting, or unable to keep medicine down    Weakness or dizziness    Vaginal discharge    Pain, redness, or swelling in the outer vaginal area (labia)  Date Last Reviewed: 10/1/2016    4185-8262 [employer]. [address] [place] [address], [place] [address]. All rights reserved. This information is not intended as a substitute for professional medical care. Always follow your healthcare professional's instructions.

## 2019-07-12 NOTE — PROGRESS NOTES
Subjective     Joann Gonzalez is a 58 year old female who presents to clinic today for the following health issues:    HPI     URINARY TRACT SYMPTOMS      Duration: 07/11/2019    Description  Burning, dysuria and hematuria    Intensity:  Moderate    Accompanying signs and symptoms:  Fever/chills: no   Flank pain no   Nausea and vomiting: no   Vaginal symptoms: discharge  Abdominal/Pelvic Pain: no     History  History of frequent UTI's: YES  History of kidney stones: no   Sexually Active: no   Possibility of pregnancy: No    Precipitating or alleviating factors: None    Therapies tried and outcome: none   Outcome: NA    Reviewed and updated as needed this visit by Provider  Tobacco  Allergies  Meds  Problems  Med Hx  Surg Hx  Fam Hx         Review of Systems   ROS COMP: CONSTITUTIONAL: NEGATIVE for fever, chills, change in weight  INTEGUMENTARY/SKIN: NEGATIVE for worrisome rashes, moles or lesions  EYES: NEGATIVE for vision changes or irritation  ENT/MOUTH: NEGATIVE for ear, mouth and throat problems  RESP: NEGATIVE for significant cough or SOB  CV: NEGATIVE for chest pain, palpitations or peripheral edema  GI: NEGATIVE for nausea, abdominal pain, heartburn, or change in bowel habits  MUSCULOSKELETAL: NEGATIVE for significant arthralgias or myalgia  NEURO: NEGATIVE for weakness, dizziness or paresthesias  HEME: NEGATIVE for bleeding problems      Objective    /60   Pulse 72   Temp 97.3  F (36.3  C) (Tympanic)   Resp 12   Ht 1.524 m (5')   Wt 50.8 kg (112 lb)   LMP  (LMP Unknown)   SpO2 98%   Breastfeeding? No   BMI 21.87 kg/m    Body mass index is 21.87 kg/m .  Physical Exam   GENERAL: Alert and no distress  EYES: Eyes grossly normal to inspection, PERRL and conjunctivae and sclerae normal  HENT: ear canals and TM's normal, nose and mouth without ulcers or lesions  NECK: no adenopathy, no asymmetry, masses, or scars and thyroid normal to palpation  RESP: lungs clear to auscultation - no rales,  rhonchi or wheezes  CV: regular rate and rhythm, normal S1 S2, no S3 or S4, no murmur, click or rub, no peripheral edema and peripheral pulses strong  ABDOMEN: soft, nontender, no hepatosplenomegaly, no masses and bowel sounds normal  MS: no gross musculoskeletal defects noted, no edema  SKIN: no suspicious lesions or rashes  NEURO: Normal strength and tone, mentation intact and speech normal  PSYCH: mentation appears normal, affect normal/bright    Diagnostic Test Results:  Labs reviewed in Epic        Assessment & Plan   Problem List Items Addressed This Visit     None      Visit Diagnoses     Dysuria    -  Primary    Relevant Medications    ciprofloxacin (CIPRO) 500 MG tablet    Other Relevant Orders    UA with Microscopic reflex to Culture (Completed)    Urine Culture Aerobic Bacterial    Hematuria, unspecified type        Relevant Orders    UA with Microscopic reflex to Culture         Urinalysis does not appear to be positive for a UTI.  Urine culture pending.  But due to concerning UTI symptoms, will Rx Cipro.  Recommended to keep up fluids and may use Tylenol and OTC Azo PRN bladder/dysuria pain as well.  Recommended to repeat U/A in a few weeks to make sure hematuria resolves.  Another U/A ordered.        See Patient Instructions  Return in about 1 week (around 7/19/2019) for If needed, If Not Getting Any Better.    Isha Mak, DO  [facility]

## 2019-07-12 NOTE — NURSING NOTE
Chief Complaint   Patient presents with     UTI     /60   Pulse 72   Temp 97.3  F (36.3  C) (Tympanic)   Resp 12   Ht 1.524 m (5')   Wt 50.8 kg (112 lb)   LMP  (LMP Unknown)   SpO2 98%   Breastfeeding? No   BMI 21.87 kg/m   Estimated body mass index is 21.87 kg/m  as calculated from the following:    Height as of this encounter: 1.524 m (5').    Weight as of this encounter: 50.8 kg (112 lb).  BP completed using cuff size: zee Pozo CMA    Health Maintenance Due   Topic Date Due     HIV SCREENING  [birth year]/12/1973     MAMMO SCREENING  03/01/2019     Health Maintenance reviewed at today's visit patient asked to schedule/complete:   Breast Cancer:  Patient agrees to schedule

## 2019-07-13 ASSESSMENT — ANXIETY QUESTIONNAIRES: GAD7 TOTAL SCORE: 11

## 2019-07-14 LAB
BACTERIA SPEC CULT: ABNORMAL
SPECIMEN SOURCE: ABNORMAL

## 2019-08-28 DIAGNOSIS — G45.9 TRANSIENT CEREBRAL ISCHEMIA, UNSPECIFIED TYPE: ICD-10-CM

## 2019-08-28 NOTE — TELEPHONE ENCOUNTER
"Requested Prescriptions   Pending Prescriptions Disp Refills     clopidogrel (PLAVIX) 75 MG tablet [Pharmacy Med Name: CLOPIDOGREL 75MG TABLETS]    Last Written Prescription Date:  05/31/2019  Last Fill Quantity: 90 tablets,  # refills: 0   Last office visit: 7/12/2019 with prescribing provider:  Obdulio   Future Office Visit:     90 tablet 0     Sig: TAKE 1 TABLET(75 MG) BY MOUTH DAILY       Plavix Passed - 8/28/2019  2:02 PM        Passed - No active PPI on record unless is Protonix        Passed - Normal HGB on file in past 12 months     Recent Labs   Lab Test 05/13/19  0637   HGB 13.8               Passed - Normal Platelets on file in past 12 months     Recent Labs   Lab Test 05/13/19  0637                  Passed - Recent (12 mo) or future (30 days) visit within the authorizing provider's specialty     Patient had office visit in the last 12 months or has a visit in the next 30 days with authorizing provider or within the authorizing provider's specialty.  See \"Patient Info\" tab in inbasket, or \"Choose Columns\" in Meds & Orders section of the refill encounter.              Passed - Medication is active on med list        Passed - Patient is age 18 or older        Passed - No active pregnancy on record        Passed - No positive pregnancy test in past 12 months         "

## 2019-08-29 RX ORDER — CLOPIDOGREL BISULFATE 75 MG/1
TABLET ORAL
Qty: 90 TABLET | Refills: 3 | Status: SHIPPED | OUTPATIENT
Start: 2019-08-29 | End: 2020-08-25

## 2019-10-17 ENCOUNTER — HOSPITAL ENCOUNTER (EMERGENCY)
Facility: CLINIC | Age: 59
Discharge: HOME | End: 2019-10-17
Attending: EMERGENCY MEDICINE | Admitting: EMERGENCY MEDICINE
Payer: MEDICARE

## 2019-10-17 VITALS
TEMPERATURE: 97.6 F | DIASTOLIC BLOOD PRESSURE: 63 MMHG | BODY MASS INDEX: 23.75 KG/M2 | OXYGEN SATURATION: 98 % | RESPIRATION RATE: 16 BRPM | WEIGHT: 121 LBS | SYSTOLIC BLOOD PRESSURE: 133 MMHG | HEIGHT: 60 IN

## 2019-10-17 DIAGNOSIS — R10.13 EPIGASTRIC PAIN: ICD-10-CM

## 2019-10-17 LAB
ALBUMIN SERPL-MCNC: 3.5 G/DL (ref 3.4–5)
ALBUMIN UR-MCNC: NEGATIVE MG/DL
ALP SERPL-CCNC: 145 U/L (ref 40–150)
ALT SERPL W P-5'-P-CCNC: 22 U/L (ref 0–50)
ANION GAP SERPL CALCULATED.3IONS-SCNC: 1 MMOL/L (ref 3–14)
APPEARANCE UR: CLEAR
AST SERPL W P-5'-P-CCNC: 25 U/L (ref 0–45)
BASOPHILS # BLD AUTO: 0 10E9/L (ref 0–0.2)
BASOPHILS NFR BLD AUTO: 0.4 %
BILIRUB SERPL-MCNC: 0.2 MG/DL (ref 0.2–1.3)
BILIRUB UR QL STRIP: NEGATIVE
BUN SERPL-MCNC: 9 MG/DL (ref 7–30)
CALCIUM SERPL-MCNC: 8.7 MG/DL (ref 8.5–10.1)
CHLORIDE SERPL-SCNC: 107 MMOL/L (ref 94–109)
CO2 SERPL-SCNC: 31 MMOL/L (ref 20–32)
COLOR UR AUTO: ABNORMAL
CREAT SERPL-MCNC: 0.55 MG/DL (ref 0.52–1.04)
DIFFERENTIAL METHOD BLD: NORMAL
EOSINOPHIL # BLD AUTO: 0.1 10E9/L (ref 0–0.7)
EOSINOPHIL NFR BLD AUTO: 1.2 %
ERYTHROCYTE [DISTWIDTH] IN BLOOD BY AUTOMATED COUNT: 13.4 % (ref 10–15)
GFR SERPL CREATININE-BSD FRML MDRD: >90 ML/MIN/{1.73_M2}
GLUCOSE SERPL-MCNC: 79 MG/DL (ref 70–99)
GLUCOSE UR STRIP-MCNC: NEGATIVE MG/DL
HCT VFR BLD AUTO: 41.3 % (ref 35–47)
HGB BLD-MCNC: 13.8 G/DL (ref 11.7–15.7)
HGB UR QL STRIP: NEGATIVE
IMM GRANULOCYTES # BLD: 0 10E9/L (ref 0–0.4)
IMM GRANULOCYTES NFR BLD: 0.2 %
KETONES UR STRIP-MCNC: NEGATIVE MG/DL
LEUKOCYTE ESTERASE UR QL STRIP: NEGATIVE
LIPASE SERPL-CCNC: 108 U/L (ref 73–393)
LYMPHOCYTES # BLD AUTO: 1.3 10E9/L (ref 0.8–5.3)
LYMPHOCYTES NFR BLD AUTO: 22.2 %
MCH RBC QN AUTO: 30.5 PG (ref 26.5–33)
MCHC RBC AUTO-ENTMCNC: 33.4 G/DL (ref 31.5–36.5)
MCV RBC AUTO: 91 FL (ref 78–100)
MONOCYTES # BLD AUTO: 0.4 10E9/L (ref 0–1.3)
MONOCYTES NFR BLD AUTO: 7 %
NEUTROPHILS # BLD AUTO: 3.9 10E9/L (ref 1.6–8.3)
NEUTROPHILS NFR BLD AUTO: 69 %
NITRATE UR QL: NEGATIVE
NRBC # BLD AUTO: 0 10*3/UL
NRBC BLD AUTO-RTO: 0 /100
PH UR STRIP: 5.5 PH (ref 5–7)
PLATELET # BLD AUTO: 225 10E9/L (ref 150–450)
POTASSIUM SERPL-SCNC: 4.4 MMOL/L (ref 3.4–5.3)
PROT SERPL-MCNC: 7.5 G/DL (ref 6.8–8.8)
RBC # BLD AUTO: 4.52 10E12/L (ref 3.8–5.2)
SODIUM SERPL-SCNC: 139 MMOL/L (ref 133–144)
SOURCE: ABNORMAL
SP GR UR STRIP: 1 (ref 1–1.03)
UROBILINOGEN UR STRIP-MCNC: NORMAL MG/DL (ref 0–2)
WBC # BLD AUTO: 5.7 10E9/L (ref 4–11)

## 2019-10-17 PROCEDURE — 96374 THER/PROPH/DIAG INJ IV PUSH: CPT

## 2019-10-17 PROCEDURE — 99285 EMERGENCY DEPT VISIT HI MDM: CPT | Mod: 25

## 2019-10-17 PROCEDURE — 96361 HYDRATE IV INFUSION ADD-ON: CPT

## 2019-10-17 PROCEDURE — 81003 URINALYSIS AUTO W/O SCOPE: CPT | Performed by: EMERGENCY MEDICINE

## 2019-10-17 PROCEDURE — 96375 TX/PRO/DX INJ NEW DRUG ADDON: CPT

## 2019-10-17 PROCEDURE — 85025 COMPLETE CBC W/AUTO DIFF WBC: CPT | Performed by: EMERGENCY MEDICINE

## 2019-10-17 PROCEDURE — 25000125 ZZHC RX 250: Performed by: EMERGENCY MEDICINE

## 2019-10-17 PROCEDURE — 83690 ASSAY OF LIPASE: CPT | Performed by: EMERGENCY MEDICINE

## 2019-10-17 PROCEDURE — 25000128 H RX IP 250 OP 636: Performed by: EMERGENCY MEDICINE

## 2019-10-17 PROCEDURE — 25800030 ZZH RX IP 258 OP 636: Performed by: EMERGENCY MEDICINE

## 2019-10-17 PROCEDURE — 80053 COMPREHEN METABOLIC PANEL: CPT | Performed by: EMERGENCY MEDICINE

## 2019-10-17 PROCEDURE — 25000132 ZZH RX MED GY IP 250 OP 250 PS 637: Mod: GY | Performed by: EMERGENCY MEDICINE

## 2019-10-17 RX ORDER — HYDROMORPHONE HYDROCHLORIDE 1 MG/ML
0.5 INJECTION, SOLUTION INTRAMUSCULAR; INTRAVENOUS; SUBCUTANEOUS
Status: COMPLETED | OUTPATIENT
Start: 2019-10-17 | End: 2019-10-17

## 2019-10-17 RX ORDER — ONDANSETRON 2 MG/ML
4 INJECTION INTRAMUSCULAR; INTRAVENOUS EVERY 30 MIN PRN
Status: DISCONTINUED | OUTPATIENT
Start: 2019-10-17 | End: 2019-10-17 | Stop reason: HOSPADM

## 2019-10-17 RX ORDER — SODIUM CHLORIDE 9 MG/ML
INJECTION, SOLUTION INTRAVENOUS CONTINUOUS
Status: DISCONTINUED | OUTPATIENT
Start: 2019-10-17 | End: 2019-10-17 | Stop reason: HOSPADM

## 2019-10-17 RX ADMIN — ONDANSETRON 4 MG: 2 INJECTION INTRAMUSCULAR; INTRAVENOUS at 17:32

## 2019-10-17 RX ADMIN — SODIUM CHLORIDE: 9 INJECTION, SOLUTION INTRAVENOUS at 17:32

## 2019-10-17 RX ADMIN — LIDOCAINE HYDROCHLORIDE 30 ML: 20 SOLUTION ORAL; TOPICAL at 18:07

## 2019-10-17 RX ADMIN — HYDROMORPHONE HYDROCHLORIDE 0.5 MG: 1 INJECTION, SOLUTION INTRAMUSCULAR; INTRAVENOUS; SUBCUTANEOUS at 17:35

## 2019-10-17 ASSESSMENT — ENCOUNTER SYMPTOMS
DIARRHEA: 0
FEVER: 0
NAUSEA: 1
ABDOMINAL PAIN: 1
VOMITING: 0

## 2019-10-17 ASSESSMENT — MIFFLIN-ST. JEOR: SCORE: 1035.35

## 2019-10-17 NOTE — ED AVS SNAPSHOT
Emergency Department  [address] [place] [address]  Phone:  [phone]  Fax:  [phone]                                    Joann Gonzalez   MRN: [id number]    Department:   Emergency Department   Date of Visit:  10/17/2019           After Visit Summary Signature Page    I have received my discharge instructions, and my questions have been answered. I have discussed any challenges I see with this plan with the nurse or doctor.    ..........................................................................................................................................  Patient/Patient Representative Signature      ..........................................................................................................................................  Patient Representative Print Name and Relationship to Patient    ..................................................               ................................................  Date                                   Time    ..........................................................................................................................................  Reviewed by Signature/Title    ...................................................              ..............................................  Date                                               Time          22EPIC Rev 08/18

## 2019-10-17 NOTE — ED PROVIDER NOTES
History     Chief Complaint:  Abdominal Pain    HPI   Joann Gonzalez is a 59 year old female with a history of hernia, gastric bypass who presents to the emergency department for evaluation of abdominal pain. The patient reports she has experienced sharp epigastric pain since around 1400 today that worsens with palpation and has persisted since onset, prompting her arrival to the ED. She remarks she had an episode of similar pain in September 2018, for which she presented to the ED and was diagnosed with small bowel volvulus; she had surgery performed with postoperative diagnosis of internal hernia, hernia repair performed by Dr. Harrison. The patient indicates she presents out of concern for the similar pain. She notes she has some nausea as well. She denies any fever, vomiting, or diarrhea, as well as any history of gastritis or heartburn.    Allergies:  Advil Pm [Ibuprofen-Diphenhydramine Cit]  Aspirin  Nsaids  Plaquenil [Hydroxychloroquine]     Medications:    Norvasc  Plavix  Prozac  Lisinopril  Miralax  Seroquel     Past Medical History:    Depression  HTN  Migraine  Overdose   Stroke  Encephalopathy   Hernia    Past Surgical History:    Abdomen surgery  Appendectomy with hysterectomy  Back surgery  Cholecystectomy  EGD combined  Gastric bypass  GI surgery  Herniorrhaphy internal  Lysis adhesions    Family History:    Alzheimer disease  HTN  Endocrine disease    Social History:  Presents alone.  Former smoker, 15 pack years, quit 2018.  Negative for alcohol use.  Marital Status:   [2]     Review of Systems   Constitutional: Negative for fever.   Gastrointestinal: Positive for abdominal pain and nausea. Negative for diarrhea and vomiting.   All other systems reviewed and are negative.      Physical Exam     Patient Vitals for the past 24 hrs:   BP Temp Temp src Heart Rate Resp SpO2 Height Weight   10/17/19 1949 133/63 -- -- 62 16 98 % -- --   10/17/19 1538 (!) 143/44 97.6  F (36.4  C) Oral 74 16 98 %  1.524 m (5') 54.9 kg (121 lb)     Physical Exam  Nursing note and vitals reviewed.    Constitutional:  Appears well-developed and well-nourished, uncomfortable.    HENT:    Nose normal.  No discharge.      Oropharynx is clear and moist.  Eyes:    Conjunctivae are normal without injection. No lid droop.     No scleral icterus.  Lymph:  No enlarged or tender cervical or submandibular lymph nodes.   Cardiovascular:  Normal rate, regular rhythm with normal S1 and S2.      Normal heart sounds and peripheral pulses 2+ and equal.       No ghazala.     Grade 1 murmur.  Pulmonary:  Effort normal and breath sounds clear to auscultation bilaterally. No respiratory distress.  No stridor.     No rales.       Few wheezes and expiratory squeaks   GI:    Soft. No distension and no mass.      Epigastric tenderness with guarding, nondistended      No rebound. No flank pain.  No HSM.  Musculoskeletal:  Normal range of motion. No extremity deformity.     No edema and no tenderness.    Neurological:   Alert and oriented. No cranial nerve deficit, no facial droop.     Exhibits good muscle tone. Coordination normal.      GCS eye subscore is 4. GCS verbal subscore is 5.      GCS motor subscore is 6.   Skin:    Skin is warm and dry. No rash noted. No diaphoresis.      No erythema. No pallor.  No lesions.  Psychiatric:   Behavior is normal. Appropriate mood and affect.     Judgment and thought content normal.     Emergency Department Course     Laboratory:  CBC: WBC: 5.7, HGB: 13.8, PLT: 225  CMP: all WNL (Creatinine: 225)  Lipase: 108    UA with micro: Specific Gravity 1.002 (L), o/w negative    Interventions:  1732 NS 1L IV infusion   Zofran 4 mg IV  1735 Dilaudid 0.5 mg IV  1807 GI Cocktail 30 mL PO    Emergency Department Course:  Nursing notes and vitals reviewed. 1625 I performed an exam of the patient as documented above.     IV inserted. Medicine administered as documented above. Blood drawn. This was sent to the lab for further  testing, results above.    The patient provided a urine sample here in the emergency department. This was sent for laboratory testing, findings above.     1756 I rechecked the patient and discussed the results of her workup thus far.     Findings and plan explained to the Patient. Patient discharged home with instructions regarding supportive care, medications, and reasons to return. The importance of close follow-up was reviewed. The patient was prescribed omeprazole.    I personally reviewed the laboratory results with the Patient and answered all related questions prior to discharge.    Impression & Plan      Medical Decision Making:  Patient comes in with epigastric pain.  No chest pain or shortness of breath.  Her comprehensive metabolic panel and lipase are normal.  CBC is normal with a white count of 5.7.  She was given 1 dose of Dilaudid when she came in because the level of pain and some Zofran.  She was given a liter of normal saline.  Still had pain in her epigastrium so she was given a GI cocktail which completely relieved her pain.  I believe this is probably gastritis.  She does not have a fever, her abdominal pain is resolved.  I do not suspect any type of perforation.  She will be discharged home on Prilosec with follow-up in the clinic.    Diagnosis:    ICD-10-CM    1. Epigastric pain R10.13        Disposition:  discharged to home  Start Prilosec 2 times a day, liquid Maalox during the day as needed.  Stop smoking.  Follow-up in the clinic next week for a recheck.  If you develop significant worsening of pain, nausea and vomiting, and fevers, return to the emergency room.    Discharge Medications:  Discharge Medication List as of 10/17/2019  7:46 PM      START taking these medications    Details   omeprazole (PRILOSEC) 20 MG DR capsule Take 1 capsule (20 mg) by mouth 2 times daily, Disp-20 capsule, R-0, E-Prescribe           José GILLIAM am serving as a scribe on 10/17/2019 at 4:22 PM to  personally document services performed by Jelly Minor MD based on my observations and the provider's statements to me.     I, Sang Tavo,  am serving as a scribe on 10/17/2019 at 5:58 PM to personally document services performed by Jelly Minor MD based on my observations and the provider's statements to me.     José Aleman  10/17/2019    EMERGENCY DEPARTMENT       Jelly Minor MD  10/17/19 2019

## 2019-10-18 NOTE — DISCHARGE INSTRUCTIONS
Start Prilosec 2 times a day, liquid Maalox during the day as needed.  Stop smoking.  Follow-up in the clinic next week for a recheck.  If you develop significant worsening of pain, nausea and vomiting, and fevers, return to the emergency room.

## 2019-10-19 DIAGNOSIS — Z86.73 HISTORY OF CVA (CEREBROVASCULAR ACCIDENT): Primary | ICD-10-CM

## 2019-10-19 DIAGNOSIS — I10 BENIGN ESSENTIAL HYPERTENSION: ICD-10-CM

## 2019-10-19 DIAGNOSIS — F32.5 MAJOR DEPRESSION IN COMPLETE REMISSION (H): ICD-10-CM

## 2019-10-19 DIAGNOSIS — Z51.81 ENCOUNTER FOR MEDICATION MONITORING: ICD-10-CM

## 2019-10-21 NOTE — TELEPHONE ENCOUNTER
Requested Prescriptions   Pending Prescriptions Disp Refills     QUEtiapine (SEROQUEL) 25 MG tablet [Pharmacy Med Name: QUETIAPINE 25MG TABLETS] 60 tablet 0     Sig: TAKE 1 TABLET(25 MG) BY MOUTH TWICE DAILY     Last Written Prescription Date:  7/2/19  Last Fill Quantity: 180,  # refills: 0   Last office visit: 7/12/2019 with prescribing provider:     Future Office Visit:        Antipsychotic Medications Failed - 10/19/2019  4:58 PM        Failed - Lipid panel on file within the past 12 months     Recent Labs   Lab Test 08/30/18  0945  12/04/13  1300   CHOL 186   < > 188   TRIG 111   < > 121   HDL 61   < > 73   *   < > 91   NHDL 125   < >  --    VLDL  --   --  24   CHOLHDLRATIO  --   --  2.6    < > = values in this interval not displayed.               Passed - Blood pressure under 140/90 in past 12 months     BP Readings from Last 3 Encounters:   10/17/19 133/63   07/12/19 120/60   07/01/19 100/62                 Passed - Patient is 12 years of age or older        Passed - CBC on file in past 12 months     Recent Labs   Lab Test 10/17/19  1648   WBC 5.7   RBC 4.52   HGB 13.8   HCT 41.3                    Passed - Heart Rate on file within past 12 months     Pulse Readings from Last 3 Encounters:   07/12/19 72   07/01/19 84   05/13/19 64               Passed - A1c or Glucose on file in past 12 months     Recent Labs   Lab Test 10/17/19  1648  07/11/14  0410   GLC 79   < > 105*   A1C  --   --  5.8    < > = values in this interval not displayed.       Please review patients last 3 weights. If a weight gain of >10 lbs exists, you may refill the prescription once after instructing the patient to schedule an appointment within the next 30 days.    Wt Readings from Last 3 Encounters:   10/17/19 54.9 kg (121 lb)   07/12/19 50.8 kg (112 lb)   07/01/19 51.3 kg (113 lb)             Passed - Medication is active on med list        Passed - Patient is not pregnant        Passed - No positve pregnancy test on file  "in past 12 months        Passed - Recent (6 mo) or future (30 days) visit within the authorizing provider's specialty     Patient had office visit in the last 6 months or has a visit in the next 30 days with authorizing provider or within the authorizing provider's specialty.  See \"Patient Info\" tab in inbasket, or \"Choose Columns\" in Meds & Orders section of the refill encounter.              "

## 2019-10-22 DIAGNOSIS — F32.5 MAJOR DEPRESSION IN COMPLETE REMISSION (H): ICD-10-CM

## 2019-10-22 RX ORDER — QUETIAPINE FUMARATE 25 MG/1
TABLET, FILM COATED ORAL
Qty: 60 TABLET | Refills: 0 | Status: SHIPPED | OUTPATIENT
Start: 2019-10-22 | End: 2019-10-22

## 2019-10-22 NOTE — TELEPHONE ENCOUNTER
Requested Prescriptions   Pending Prescriptions Disp Refills     QUEtiapine (SEROQUEL) 25 MG tablet [Pharmacy Med Name: QUETIAPINE 25MG TABLETS]  Last Written Prescription Date:  10/22/2019  Last Fill Quantity: 60 tablet,  # refills: 0   Last office visit: 7/12/2019 with prescribing provider:  Obdulio   Future Office Visit:     180 tablet 0     Sig: TAKE 1 TABLET BY MOUTH TWICE DAILY       Antipsychotic Medications Failed - 10/22/2019 11:46 AM        Failed - Lipid panel on file within the past 12 months     Recent Labs   Lab Test 08/30/18  0945  12/04/13  1300   CHOL 186   < > 188   TRIG 111   < > 121   HDL 61   < > 73   *   < > 91   NHDL 125   < >  --    VLDL  --   --  24   CHOLHDLRATIO  --   --  2.6    < > = values in this interval not displayed.               Passed - Blood pressure under 140/90 in past 12 months     BP Readings from Last 3 Encounters:   10/17/19 133/63   07/12/19 120/60   07/01/19 100/62                 Passed - Patient is 12 years of age or older        Passed - CBC on file in past 12 months     Recent Labs   Lab Test 10/17/19  1648   WBC 5.7   RBC 4.52   HGB 13.8   HCT 41.3                    Passed - Heart Rate on file within past 12 months     Pulse Readings from Last 3 Encounters:   07/12/19 72   07/01/19 84   05/13/19 64               Passed - A1c or Glucose on file in past 12 months     Recent Labs   Lab Test 10/17/19  1648  07/11/14  0410   GLC 79   < > 105*   A1C  --   --  5.8    < > = values in this interval not displayed.       Please review patients last 3 weights. If a weight gain of >10 lbs exists, you may refill the prescription once after instructing the patient to schedule an appointment within the next 30 days.    Wt Readings from Last 3 Encounters:   10/17/19 54.9 kg (121 lb)   07/12/19 50.8 kg (112 lb)   07/01/19 51.3 kg (113 lb)             Passed - Medication is active on med list        Passed - Patient is not pregnant        Passed - No positve pregnancy  "test on file in past 12 months        Passed - Recent (6 mo) or future (30 days) visit within the authorizing provider's specialty     Patient had office visit in the last 6 months or has a visit in the next 30 days with authorizing provider or within the authorizing provider's specialty.  See \"Patient Info\" tab in inbasket, or \"Choose Columns\" in Meds & Orders section of the refill encounter.               "

## 2019-10-22 NOTE — TELEPHONE ENCOUNTER
Medication is being filled for 1 time refill only due to:  Also noted PCP wanted to see patient back for follow up at med change 9/2019. Call to schedule OV ASAP.     PCP please order LDL as needed for this medication. Current assoc. Dx not accepted.

## 2019-10-24 RX ORDER — QUETIAPINE FUMARATE 25 MG/1
TABLET, FILM COATED ORAL
Qty: 180 TABLET | Refills: 0 | Status: SHIPPED | OUTPATIENT
Start: 2019-10-24 | End: 2020-02-03

## 2019-10-24 NOTE — TELEPHONE ENCOUNTER
"Called patient back, someone answered and when I asked for the patient said \"she is sleeping\" and hung up.     "

## 2019-10-25 NOTE — TELEPHONE ENCOUNTER
Outreach to patient again this AM.     Busy signal when tried x3.     No further follow up needed at this time.

## 2019-11-22 DIAGNOSIS — I10 ESSENTIAL HYPERTENSION: ICD-10-CM

## 2019-11-23 NOTE — TELEPHONE ENCOUNTER
"Requested Prescriptions   Pending Prescriptions Disp Refills     lisinopril (PRINIVIL/ZESTRIL) 20 MG tablet [Pharmacy Med Name: LISINOPRIL 20MG TABLETS]    Last Written Prescription Date:  08/26/2019  Last Fill Quantity: 90 tablet,  # refills: 0   Last office visit: 7/12/2019 with prescribing provider:  Obdulio   Future Office Visit:     90 tablet 0     Sig: TAKE 1 TABLET(20 MG) BY MOUTH DAILY       ACE Inhibitors (Including Combos) Protocol Passed - 11/22/2019  6:37 PM        Passed - Blood pressure under 140/90 in past 12 months     BP Readings from Last 3 Encounters:   10/17/19 133/63   07/12/19 120/60   07/01/19 100/62                 Passed - Recent (12 mo) or future (30 days) visit within the authorizing provider's specialty     Patient has had an office visit with the authorizing provider or a provider within the authorizing providers department within the previous 12 mos or has a future within next 30 days. See \"Patient Info\" tab in inbasket, or \"Choose Columns\" in Meds & Orders section of the refill encounter.              Passed - Medication is active on med list        Passed - Patient is age 18 or older        Passed - No active pregnancy on record        Passed - Normal serum creatinine on file in past 12 months     Recent Labs   Lab Test 10/17/19  1648  10/20/12  2031   CR 0.55   < >  --    CREAT  --   --  0.6    < > = values in this interval not displayed.             Passed - Normal serum potassium on file in past 12 months     Recent Labs   Lab Test 10/17/19  1648   POTASSIUM 4.4             Passed - No positive pregnancy test within past 12 months           "

## 2019-11-25 RX ORDER — LISINOPRIL 20 MG/1
TABLET ORAL
Qty: 90 TABLET | Refills: 2 | Status: SHIPPED | OUTPATIENT
Start: 2019-11-25 | End: 2020-08-25

## 2020-01-21 DIAGNOSIS — F32.5 MAJOR DEPRESSION IN COMPLETE REMISSION (H): ICD-10-CM

## 2020-01-21 NOTE — TELEPHONE ENCOUNTER
"Requested Prescriptions   Pending Prescriptions Disp Refills     QUEtiapine (SEROQUEL) 100 MG tablet  Last Written Prescription Date:  7/26/2019  Last Fill Quantity: 90 tablet,  # refills: 1   Last office visit: 7/12/2019 with prescribing provider:  Obdulio   Future Office Visit:     90 tablet 1     Sig: Take 1 tablet (100 mg) by mouth At Bedtime       Antipsychotic Medications Failed - 1/21/2020  3:27 PM        Failed - Lipid panel on file within the past 12 months     Recent Labs   Lab Test 08/30/18  0945  12/04/13  1300   CHOL 186   < > 188   TRIG 111   < > 121   HDL 61   < > 73   *   < > 91   NHDL 125   < >  --    VLDL  --   --  24   CHOLHDLRATIO  --   --  2.6    < > = values in this interval not displayed.               Failed - Recent (6 mo) or future (30 days) visit within the authorizing provider's specialty     Patient had office visit in the last 6 months or has a visit in the next 30 days with authorizing provider or within the authorizing provider's specialty.  See \"Patient Info\" tab in inbasket, or \"Choose Columns\" in Meds & Orders section of the refill encounter.            Passed - Blood pressure under 140/90 in past 12 months     BP Readings from Last 3 Encounters:   10/17/19 133/63   07/12/19 120/60   07/01/19 100/62                 Passed - Patient is 12 years of age or older        Passed - CBC on file in past 12 months     Recent Labs   Lab Test 10/17/19  1648   WBC 5.7   RBC 4.52   HGB 13.8   HCT 41.3                    Passed - Heart Rate on file within past 12 months     Pulse Readings from Last 3 Encounters:   07/12/19 72   07/01/19 84   05/13/19 64               Passed - A1c or Glucose on file in past 12 months     Recent Labs   Lab Test 10/17/19  1648  07/11/14  0410   GLC 79   < > 105*   A1C  --   --  5.8    < > = values in this interval not displayed.       Please review patients last 3 weights. If a weight gain of >10 lbs exists, you may refill the prescription once after " "instructing the patient to schedule an appointment within the next 30 days.    Wt Readings from Last 3 Encounters:   10/17/19 54.9 kg (121 lb)   07/12/19 50.8 kg (112 lb)   07/01/19 51.3 kg (113 lb)             Passed - Medication is active on med list        Passed - Patient is not pregnant        Passed - No positve pregnancy test on file in past 12 months        QUEtiapine (SEROQUEL) 25 MG tablet  Last Written Prescription Date:  10/24/2019  Last Fill Quantity: 180 tablet,  # refills: 0   Last office visit: 7/12/2019 with prescribing provider:  Obdulio   Future Office Visit:     180 tablet 0       Antipsychotic Medications Failed - 1/21/2020  3:27 PM        Failed - Lipid panel on file within the past 12 months     Recent Labs   Lab Test 08/30/18  0945  12/04/13  1300   CHOL 186   < > 188   TRIG 111   < > 121   HDL 61   < > 73   *   < > 91   NHDL 125   < >  --    VLDL  --   --  24   CHOLHDLRATIO  --   --  2.6    < > = values in this interval not displayed.               Failed - Recent (6 mo) or future (30 days) visit within the authorizing provider's specialty     Patient had office visit in the last 6 months or has a visit in the next 30 days with authorizing provider or within the authorizing provider's specialty.  See \"Patient Info\" tab in inbasket, or \"Choose Columns\" in Meds & Orders section of the refill encounter.            Passed - Blood pressure under 140/90 in past 12 months     BP Readings from Last 3 Encounters:   10/17/19 133/63   07/12/19 120/60   07/01/19 100/62                 Passed - Patient is 12 years of age or older        Passed - CBC on file in past 12 months     Recent Labs   Lab Test 10/17/19  1648   WBC 5.7   RBC 4.52   HGB 13.8   HCT 41.3                    Passed - Heart Rate on file within past 12 months     Pulse Readings from Last 3 Encounters:   07/12/19 72   07/01/19 84   05/13/19 64               Passed - A1c or Glucose on file in past 12 months     Recent Labs "   Lab Test 10/17/19  1648  07/11/14  0410   GLC 79   < > 105*   A1C  --   --  5.8    < > = values in this interval not displayed.       Please review patients last 3 weights. If a weight gain of >10 lbs exists, you may refill the prescription once after instructing the patient to schedule an appointment within the next 30 days.    Wt Readings from Last 3 Encounters:   10/17/19 54.9 kg (121 lb)   07/12/19 50.8 kg (112 lb)   07/01/19 51.3 kg (113 lb)             Passed - Medication is active on med list        Passed - Patient is not pregnant        Passed - No positve pregnancy test on file in past 12 months

## 2020-01-22 RX ORDER — QUETIAPINE FUMARATE 25 MG/1
TABLET, FILM COATED ORAL
Qty: 60 TABLET | Refills: 0 | Status: SHIPPED | OUTPATIENT
Start: 2020-01-22 | End: 2020-02-03

## 2020-01-22 RX ORDER — QUETIAPINE FUMARATE 100 MG/1
100 TABLET, FILM COATED ORAL AT BEDTIME
Qty: 30 TABLET | Refills: 0 | Status: SHIPPED | OUTPATIENT
Start: 2020-01-22 | End: 2020-02-03

## 2020-01-22 NOTE — TELEPHONE ENCOUNTER
Routing refill request to provider for review/approval because:  Labs not current:  Lipid panel not on file    Future Office Visit:   Next 5 appointments (look out 90 days)    Feb 03, 2020  2:00 PM CST  SHORT with Esau Ospina MD  [facility] ([facility]) [address] [address]  [phone]         Pended for 30 days to get through to next appt

## 2020-01-27 DIAGNOSIS — F32.5 MAJOR DEPRESSIVE DISORDER, SINGLE EPISODE IN FULL REMISSION (H): ICD-10-CM

## 2020-01-27 DIAGNOSIS — F41.1 ANXIETY STATE: ICD-10-CM

## 2020-01-27 NOTE — TELEPHONE ENCOUNTER
"Requested Prescriptions   Pending Prescriptions Disp Refills     FLUoxetine (PROZAC) 20 MG capsule  Last Written Prescription Date:  10/31/2019  Last Fill Quantity: 180 capsule,  # refills: 0   Last office visit: 7/12/2019 with prescribing provider:  Obdulio   Future Office Visit:   Next 5 appointments (look out 90 days)    Feb 03, 2020  2:00 PM CST  SHORT with Esau Ospina MD  [facility] [address] [place] [id number]  [phone]          180 capsule 0       SSRIs Protocol Failed - 1/27/2020  9:27 AM        Failed - PHQ-9 score less than 5 in past 6 months     Please review last PHQ-9 score.   PHQ-9 SCORE 8/30/2018 7/1/2019 7/12/2019   PHQ-9 Total Score - - -   PHQ-9 Total Score - - -   PHQ-9 Total Score 11 8 6     PAU-7 SCORE 8/30/2018 7/1/2019 7/12/2019   Total Score - - -   Total Score 5 14 11   Total Score - - -             Passed - Medication is active on med list        Passed - Patient is age 18 or older        Passed - No active pregnancy on record        Passed - No positive pregnancy test in last 12 months        Passed - Recent (6 mo) or future (30 days) visit within the authorizing provider's specialty     Patient had office visit in the last 6 months or has a visit in the next 30 days with authorizing provider or within the authorizing provider's specialty.  See \"Patient Info\" tab in inbasket, or \"Choose Columns\" in Meds & Orders section of the refill encounter.               "

## 2020-01-28 NOTE — TELEPHONE ENCOUNTER
Medication is being filled for 1 time refill only due to:  Patient needs to be seen because due for 6 month follow up office visit, visit scheduled.

## 2020-02-03 ENCOUNTER — OFFICE VISIT (OUTPATIENT)
Dept: FAMILY MEDICINE | Facility: CLINIC | Age: 60
End: 2020-02-03
Payer: MEDICARE

## 2020-02-03 VITALS
HEIGHT: 60 IN | SYSTOLIC BLOOD PRESSURE: 130 MMHG | BODY MASS INDEX: 23.75 KG/M2 | WEIGHT: 121 LBS | OXYGEN SATURATION: 97 % | RESPIRATION RATE: 14 BRPM | TEMPERATURE: 97.6 F | HEART RATE: 66 BPM | DIASTOLIC BLOOD PRESSURE: 60 MMHG

## 2020-02-03 DIAGNOSIS — I10 BENIGN ESSENTIAL HYPERTENSION: Primary | ICD-10-CM

## 2020-02-03 DIAGNOSIS — F32.5 MAJOR DEPRESSION IN COMPLETE REMISSION (H): ICD-10-CM

## 2020-02-03 DIAGNOSIS — F41.1 ANXIETY STATE: ICD-10-CM

## 2020-02-03 DIAGNOSIS — F33.41 RECURRENT MAJOR DEPRESSION IN PARTIAL REMISSION (H): ICD-10-CM

## 2020-02-03 DIAGNOSIS — Z23 ENCOUNTER FOR IMMUNIZATION: ICD-10-CM

## 2020-02-03 DIAGNOSIS — G45.9 TRANSIENT CEREBRAL ISCHEMIA, UNSPECIFIED TYPE: ICD-10-CM

## 2020-02-03 PROCEDURE — 90682 RIV4 VACC RECOMBINANT DNA IM: CPT | Performed by: FAMILY MEDICINE

## 2020-02-03 PROCEDURE — 80061 LIPID PANEL: CPT | Performed by: FAMILY MEDICINE

## 2020-02-03 PROCEDURE — 36415 COLL VENOUS BLD VENIPUNCTURE: CPT | Performed by: FAMILY MEDICINE

## 2020-02-03 PROCEDURE — 99214 OFFICE O/P EST MOD 30 MIN: CPT | Mod: 25 | Performed by: FAMILY MEDICINE

## 2020-02-03 PROCEDURE — G0008 ADMIN INFLUENZA VIRUS VAC: HCPCS | Performed by: FAMILY MEDICINE

## 2020-02-03 PROCEDURE — 80053 COMPREHEN METABOLIC PANEL: CPT | Performed by: FAMILY MEDICINE

## 2020-02-03 RX ORDER — QUETIAPINE FUMARATE 25 MG/1
TABLET, FILM COATED ORAL
Qty: 180 TABLET | Refills: 1 | Status: SHIPPED | OUTPATIENT
Start: 2020-02-03 | End: 2020-08-05

## 2020-02-03 RX ORDER — QUETIAPINE FUMARATE 100 MG/1
100 TABLET, FILM COATED ORAL AT BEDTIME
Qty: 90 TABLET | Refills: 1 | Status: SHIPPED | OUTPATIENT
Start: 2020-02-03 | End: 2020-08-05

## 2020-02-03 ASSESSMENT — PATIENT HEALTH QUESTIONNAIRE - PHQ9
5. POOR APPETITE OR OVEREATING: SEVERAL DAYS
SUM OF ALL RESPONSES TO PHQ QUESTIONS 1-9: 2

## 2020-02-03 ASSESSMENT — ANXIETY QUESTIONNAIRES
5. BEING SO RESTLESS THAT IT IS HARD TO SIT STILL: NOT AT ALL
6. BECOMING EASILY ANNOYED OR IRRITABLE: SEVERAL DAYS
IF YOU CHECKED OFF ANY PROBLEMS ON THIS QUESTIONNAIRE, HOW DIFFICULT HAVE THESE PROBLEMS MADE IT FOR YOU TO DO YOUR WORK, TAKE CARE OF THINGS AT HOME, OR GET ALONG WITH OTHER PEOPLE: NOT DIFFICULT AT ALL
3. WORRYING TOO MUCH ABOUT DIFFERENT THINGS: NOT AT ALL
7. FEELING AFRAID AS IF SOMETHING AWFUL MIGHT HAPPEN: SEVERAL DAYS
GAD7 TOTAL SCORE: 4
2. NOT BEING ABLE TO STOP OR CONTROL WORRYING: NOT AT ALL
1. FEELING NERVOUS, ANXIOUS, OR ON EDGE: SEVERAL DAYS

## 2020-02-03 ASSESSMENT — MIFFLIN-ST. JEOR: SCORE: 1035.35

## 2020-02-03 NOTE — LETTER
February 5, 2020      Joann Gonzalez  [address]   [facility] [address]        Dear ,    We are writing to inform you of your test results.    Metabolic panel is normal  Cholesterol panel shows LDL below goal of 130 at 114.    Resulted Orders   Lipid panel reflex to direct LDL Non-fasting   Result Value Ref Range    Cholesterol 205 (H) <200 mg/dL      Comment:      Desirable:       <200 mg/dl    Triglycerides 95 <150 mg/dL      Comment:      Fasting specimen    HDL Cholesterol 72 >49 mg/dL    LDL Cholesterol Calculated 114 (H) <100 mg/dL      Comment:      Above desirable:  100-129 mg/dl  Borderline High:  130-159 mg/dL  High:             160-189 mg/dL  Very high:       >189 mg/dl      Non HDL Cholesterol 133 (H) <130 mg/dL      Comment:      Above Desirable:  130-159 mg/dl  Borderline high:  160-189 mg/dl  High:             190-219 mg/dl  Very high:       >219 mg/dl     Comprehensive metabolic panel   Result Value Ref Range    Sodium 139 133 - 144 mmol/L    Potassium 3.7 3.4 - 5.3 mmol/L    Chloride 107 94 - 109 mmol/L    Carbon Dioxide 25 20 - 32 mmol/L    Anion Gap 7 3 - 14 mmol/L    Glucose 80 70 - 99 mg/dL      Comment:      Fasting specimen    Urea Nitrogen 8 7 - 30 mg/dL    Creatinine 0.58 0.52 - 1.04 mg/dL    GFR Estimate >90 >60 mL/min/[1.73_m2]      Comment:      Non  GFR Calc  Starting 12/18/2018, serum creatinine based estimated GFR (eGFR) will be   calculated using the Chronic Kidney Disease Epidemiology Collaboration   (CKD-EPI) equation.      GFR Estimate If Black >90 >60 mL/min/[1.73_m2]      Comment:       GFR Calc  Starting 12/18/2018, serum creatinine based estimated GFR (eGFR) will be   calculated using the Chronic Kidney Disease Epidemiology Collaboration   (CKD-EPI) equation.      Calcium 8.5 8.5 - 10.1 mg/dL    Bilirubin Total 0.3 0.2 - 1.3 mg/dL    Albumin 3.4 3.4 - 5.0 g/dL    Protein Total 7.2 6.8 - 8.8 g/dL    Alkaline Phosphatase 137  40 - 150 U/L    ALT 17 0 - 50 U/L    AST 22 0 - 45 U/L       If you have any questions or concerns, please call the clinic at the number listed above.       Sincerely,        Esau Ospina MD

## 2020-02-03 NOTE — PROGRESS NOTES
Subjective     Joann Gonzalez is a 59 year old female who presents to clinic today for the following health issues:    HPI     Hypertension Follow-Up      Do you check your blood pressure regularly outside of the clinic? No     Are you following a low salt diet? No    Are your blood pressures ever more than 140 on the top number (systolic) OR more   than 90 on the bottom number (diastolic), for example 140/90? NA     Cerebrovascular Follow-Up      Patient history: ischemic cerebrovascular incident    Residual symptoms: None    Worsened or new symptoms since last visit: No    Daily aspirin use: No    Hypertension controlled: Yes    Depression and Anxiety Follow-Up      How are you doing with your depression since your last visit? Stable    How are you doing with your anxiety since your last visit?  Worsened    Are you having other symptoms that might be associated with depression or anxiety? Yes:  anxiety comes out of nowhere.    Have you had a significant life event? No     Do you have any concerns with your use of alcohol or other drugs? 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     Drug use: No     PHQ 7/30/2018 8/30/2018 7/1/2019   PHQ-9 Total Score 11 11 8   Q9: Thoughts of better off dead/self-harm past 2 weeks Not at all Not at all Not at all     PAU-7 SCORE 7/31/2017 8/30/2018 7/1/2019   Total Score - - -   Total Score 5 5 14   Total Score - - -     No flowsheet data found.  No flowsheet data found.  In the past two weeks have you had thoughts of suicide or self-harm?  No.    Do you have concerns about your personal safety or the safety of others?   No    Suicide Assessment Five-step Evaluation and Treatment (SAFE-T)      How many servings of fruits and  vegetables do you eat daily?  2-3    On average, how many sweetened beverages do you drink each day (Examples: soda, juice, sweet tea, etc.  Do NOT count diet or artificially sweetened beverages)?   0    How many days per week do you exercise enough to make your heart beat faster? 4    How many minutes a day do you exercise enough to make your heart beat faster? 9 or less    How many days per week do you miss taking your medication? 0     Pt takes Fluoxetine 20 mg 2 tabs daily  She takes the Seroquel 25 mg twice daily and 100 mg at night        BP Readings from Last 3 Encounters:   02/03/20 130/60   10/17/19 133/63   07/12/19 120/60    Wt Readings from Last 3 Encounters:   02/03/20 54.9 kg (121 lb)   10/17/19 54.9 kg (121 lb)   07/12/19 50.8 kg (112 lb)                      Reviewed and updated as needed this visit by Provider         Review of Systems   ROS COMP: CONSTITUTIONAL: NEGATIVE for fever, chills, change in weight  ENT/MOUTH: NEGATIVE for ear, mouth and throat problems  RESP: NEGATIVE for significant cough or SOB  CV: NEGATIVE for chest pain, palpitations or peripheral edema  PSYCHIATRIC: POSITIVE forHx anxiety and Hx depression      Objective    /60   Pulse 66   Temp 97.6  F (36.4  C) (Tympanic)   Resp 14   Ht 1.524 m (5')   Wt 54.9 kg (121 lb)   LMP  (LMP Unknown)   SpO2 97%   Breastfeeding No   BMI 23.63 kg/m    Body mass index is 23.63 kg/m .  Physical Exam   GENERAL: healthy, alert and no distress  NECK: no adenopathy, no asymmetry, masses, or scars and thyroid normal to palpation  RESP: lungs clear to auscultation - no rales, rhonchi or wheezes  CV: regular rate and rhythm, normal S1 S2, no S3 or S4, no murmur, click or rub, no peripheral edema and peripheral pulses strong  ABDOMEN: soft, nontender, no hepatosplenomegaly, no masses and bowel sounds normal  PSYCH: mentation appears normal and affect flat    Diagnostic Test Results:  Labs reviewed in [facility]  Lab: see below, results  pending        Assessment & Plan     Joann was seen today for recheck medication.    Diagnoses and all orders for this visit:    Benign essential hypertension  -     Lipid panel reflex to direct LDL Non-fasting  -     Comprehensive metabolic panel    Recurrent major depression in partial remission (H)  -     FLUoxetine (PROZAC) 20 MG capsule; TAKE 2 CAPSULES BY MOUTH EVERY DAY    Transient cerebral ischemia, unspecified type    Anxiety state  -     FLUoxetine (PROZAC) 20 MG capsule; TAKE 2 CAPSULES BY MOUTH EVERY DAY    Major depression in complete remission (HCC) on meds   -     QUEtiapine (SEROQUEL) 100 MG tablet; Take 1 tablet (100 mg) by mouth At Bedtime  -     QUEtiapine (SEROQUEL) 25 MG tablet; TAKE 1 TABLET(25 MG) BY MOUTH TWICE DAILY    Encounter for immunization  -     FLU VAC, QUADRIVALENT (RIV4) RECOMBINANT DNA, IM  -     VACCINE ADMINISTRATION, INITIAL           FUTURE APPOINTMENTS:       - Follow-up visit in 6 mo   Patient Instructions   Continue with current medications      Return in about 6 months (around 8/3/2020) for Hypertension, Depression, Anxiety.    Esau Ospina MD  [facility]

## 2020-02-04 LAB
ALBUMIN SERPL-MCNC: 3.4 G/DL (ref 3.4–5)
ALP SERPL-CCNC: 137 U/L (ref 40–150)
ALT SERPL W P-5'-P-CCNC: 17 U/L (ref 0–50)
ANION GAP SERPL CALCULATED.3IONS-SCNC: 7 MMOL/L (ref 3–14)
AST SERPL W P-5'-P-CCNC: 22 U/L (ref 0–45)
BILIRUB SERPL-MCNC: 0.3 MG/DL (ref 0.2–1.3)
BUN SERPL-MCNC: 8 MG/DL (ref 7–30)
CALCIUM SERPL-MCNC: 8.5 MG/DL (ref 8.5–10.1)
CHLORIDE SERPL-SCNC: 107 MMOL/L (ref 94–109)
CHOLEST SERPL-MCNC: 205 MG/DL
CO2 SERPL-SCNC: 25 MMOL/L (ref 20–32)
CREAT SERPL-MCNC: 0.58 MG/DL (ref 0.52–1.04)
GFR SERPL CREATININE-BSD FRML MDRD: >90 ML/MIN/{1.73_M2}
GLUCOSE SERPL-MCNC: 80 MG/DL (ref 70–99)
HDLC SERPL-MCNC: 72 MG/DL
LDLC SERPL CALC-MCNC: 114 MG/DL
NONHDLC SERPL-MCNC: 133 MG/DL
POTASSIUM SERPL-SCNC: 3.7 MMOL/L (ref 3.4–5.3)
PROT SERPL-MCNC: 7.2 G/DL (ref 6.8–8.8)
SODIUM SERPL-SCNC: 139 MMOL/L (ref 133–144)
TRIGL SERPL-MCNC: 95 MG/DL

## 2020-02-04 ASSESSMENT — ANXIETY QUESTIONNAIRES: GAD7 TOTAL SCORE: 4

## 2020-06-26 DIAGNOSIS — I10 BENIGN ESSENTIAL HYPERTENSION: ICD-10-CM

## 2020-06-26 RX ORDER — AMLODIPINE BESYLATE 10 MG/1
10 TABLET ORAL DAILY
Qty: 90 TABLET | Refills: 2 | Status: SHIPPED | OUTPATIENT
Start: 2020-06-26 | End: 2021-03-25

## 2020-07-13 ENCOUNTER — VIRTUAL VISIT (OUTPATIENT)
Dept: FAMILY MEDICINE | Facility: CLINIC | Age: 60
End: 2020-07-13
Payer: MEDICARE

## 2020-07-13 DIAGNOSIS — R14.0 ABDOMINAL BLOATING: ICD-10-CM

## 2020-07-13 DIAGNOSIS — K21.9 GASTROESOPHAGEAL REFLUX DISEASE WITHOUT ESOPHAGITIS: Primary | ICD-10-CM

## 2020-07-13 PROCEDURE — 99441 ZZC PHYSICIAN TELEPHONE EVALUATION 5-10 MIN: CPT | Performed by: FAMILY MEDICINE

## 2020-07-13 ASSESSMENT — PATIENT HEALTH QUESTIONNAIRE - PHQ9: SUM OF ALL RESPONSES TO PHQ QUESTIONS 1-9: 3

## 2020-07-13 NOTE — PATIENT INSTRUCTIONS
Avoid all dairy products for 2 weeks  Take Miralax 1 capful daily    How Severe Are Your Bumps?: mild Have Your Bumps Been Treated?: not been treated Is This A New Presentation, Or A Follow-Up?: Bump

## 2020-07-13 NOTE — PROGRESS NOTES
"Joann Gonzalez is a 59 year old female who is being evaluated via a billable telephone visit.      The patient has been notified of following:     \"This telephone visit will be conducted via a call between you and your physician/provider. We have found that certain health care needs can be provided without the need for a physical exam.  This service lets us provide the care you need with a short phone conversation.  If a prescription is necessary we can send it directly to your pharmacy.  If lab work is needed we can place an order for that and you can then stop by our lab to have the test done at a later time.    Telephone visits are billed at different rates depending on your insurance coverage. During this emergency period, for some insurers they may be billed the same as an in-person visit.  Please reach out to your insurance provider with any questions.    If during the course of the call the physician/provider feels a telephone visit is not appropriate, you will not be charged for this service.\"    Patient has given verbal consent for Telephone visit?  Yes    What phone number would you like to be contacted at?  [phone]    How would you like to obtain your AVS? Mail a copy    Subjective     Joann Gonzalez is a 59 year old female who presents via phone visit today for the following health issues:    HPI  Gastrointestinal symptoms      Duration: 2 weeks    Description:  Bloating and gassy       Intensity:  moderate    Accompanying signs and symptoms:  loose stools and constipation    History  Previous {similar problem: history of gastric bypass   Previous evaluation:  none    Aggravating factors: anything she eats    Alleviating factors:  miralax    Other Therapies tried: None    Pt took heaping capful of Miralax twice daily for about a week, she got loose stools.  Symptoms improved but now have started back again    She has held off on the Miralax for 2-3 days             Allergies   Allergen Reactions     " Advil Pm [Ibuprofen-Diphenhydramine Cit] Other (See Comments)     Or any ibuprophen  Reports red face     Aspirin Unknown     Nsaids      Plaquenil [Hydroxychloroquine] Rash       Reviewed and updated as needed this visit by Provider         Review of Systems   CONSTITUTIONAL: NEGATIVE for fever, chills, change in weight  ENT/MOUTH: NEGATIVE for ear, mouth and throat problems  RESP: NEGATIVE for significant cough or SOB  CV: NEGATIVE for chest pain, palpitations or peripheral edema  GI: POSITIVE for gas or bloating and heartburn or reflux and NEGATIVE for diarrhea, nausea and vomiting       Objective   Reported vitals:  LMP  (LMP Unknown)    healthy, alert and no distress  PSYCH: Alert and oriented times 3; coherent speech, normal   rate and volume, able to articulate logical thoughts, able   to abstract reason, no tangential thoughts, no hallucinations   or delusions  Her affect is normal and pleasant  RESP: No cough, no audible wheezing, able to talk in full sentences  Remainder of exam unable to be completed due to telephone visits    Diagnostic Test Results:  Labs reviewed in Epic  none         Assessment/Plan:  1. Gastroesophageal reflux disease without esophagitis  stable    2. Abdominal bloating  moderate      Return in about 1 month (around 8/13/2020) for abdominal pain, bloating.      Phone call duration:  10 minutes    Esau Ospina MD

## 2020-07-20 ENCOUNTER — TRANSFERRED RECORDS (OUTPATIENT)
Dept: HEALTH INFORMATION MANAGEMENT | Facility: CLINIC | Age: 60
End: 2020-07-20

## 2020-08-05 DIAGNOSIS — F32.5 MAJOR DEPRESSION IN COMPLETE REMISSION (H): ICD-10-CM

## 2020-08-05 RX ORDER — QUETIAPINE FUMARATE 25 MG/1
TABLET, FILM COATED ORAL
Qty: 180 TABLET | Refills: 0 | Status: SHIPPED | OUTPATIENT
Start: 2020-08-05 | End: 2020-11-06

## 2020-08-05 RX ORDER — QUETIAPINE FUMARATE 100 MG/1
TABLET, FILM COATED ORAL
Qty: 90 TABLET | Refills: 0 | Status: SHIPPED | OUTPATIENT
Start: 2020-08-05 | End: 2020-11-03

## 2020-08-19 ENCOUNTER — TRANSFERRED RECORDS (OUTPATIENT)
Dept: HEALTH INFORMATION MANAGEMENT | Facility: CLINIC | Age: 60
End: 2020-08-19

## 2020-08-24 DIAGNOSIS — F33.41 RECURRENT MAJOR DEPRESSION IN PARTIAL REMISSION (H): ICD-10-CM

## 2020-08-24 DIAGNOSIS — F41.1 ANXIETY STATE: ICD-10-CM

## 2020-08-25 DIAGNOSIS — G45.9 TRANSIENT CEREBRAL ISCHEMIA, UNSPECIFIED TYPE: ICD-10-CM

## 2020-08-25 DIAGNOSIS — I10 ESSENTIAL HYPERTENSION: ICD-10-CM

## 2020-08-25 RX ORDER — LISINOPRIL 20 MG/1
20 TABLET ORAL DAILY
Qty: 90 TABLET | Refills: 2 | Status: SHIPPED | OUTPATIENT
Start: 2020-08-25 | End: 2021-04-12

## 2020-08-25 RX ORDER — CLOPIDOGREL BISULFATE 75 MG/1
TABLET ORAL
Qty: 90 TABLET | Refills: 3 | Status: SHIPPED | OUTPATIENT
Start: 2020-08-25 | End: 2021-08-27

## 2020-09-14 ENCOUNTER — TRANSFERRED RECORDS (OUTPATIENT)
Dept: HEALTH INFORMATION MANAGEMENT | Facility: CLINIC | Age: 60
End: 2020-09-14

## 2020-09-24 NOTE — NURSING NOTE
Chief Complaint   Patient presents with     Foot Problems     sores on both feet x3 months        Initial /70  Ht 5' (1.524 m)  Wt 106 lb (48.1 kg)  BMI 20.7 kg/m2 Estimated body mass index is 20.7 kg/(m^2) as calculated from the following:    Height as of this encounter: 5' (1.524 m).    Weight as of this encounter: 106 lb (48.1 kg).  Medication Reconciliation: complete   Hemalatha Kovacs MA       I left a message for the patient to call me. I was calling the patient to see if I could schedule her an appointment to see Dr. Prince for a cataract evaluation.

## 2020-10-26 ENCOUNTER — TELEPHONE (OUTPATIENT)
Dept: FAMILY MEDICINE | Facility: CLINIC | Age: 60
End: 2020-10-26

## 2020-10-26 NOTE — TELEPHONE ENCOUNTER
Reason for Call:  Other call back    Detailed comments: Patient reports her handicap card expires 10/31.  Patient needs a new form completed for renewal.  Patient is requesting the form and she will come to the clinic to pick it up when completed.      Phone Number Patient can be reached at: Cell number on file:    Telephone Information:   Mobile [phone]       Best Time: any time    Can we leave a detailed message on this number? YES    Call taken on 10/26/2020 at 1:03 PM by Aziza Turner

## 2020-10-28 NOTE — TELEPHONE ENCOUNTER
Patient Contact    Attempt # 1    Was call answered?  No.  Left message on voicemail with information to call me back.    On call back:    -Please clarify if pt has had this done before, and who did it.      This is not found in media tab.   FYI- Dr. Ospina is OOO so different provider will have to complete this

## 2020-10-29 NOTE — TELEPHONE ENCOUNTER
Patient Contact     Attempt # 2     Was call answered?  No.  Left message on voicemail with information to call me back.     On call back:     -Please clarify if pt has had this done before, and who did it.        This is not found in media tab.   FYI- Dr. Ospina is OOO so different provider will have to complete this

## 2020-10-30 ENCOUNTER — VIRTUAL VISIT (OUTPATIENT)
Dept: FAMILY MEDICINE | Facility: CLINIC | Age: 60
End: 2020-10-30
Payer: MEDICARE

## 2020-10-30 DIAGNOSIS — Z86.73 HISTORY OF CVA (CEREBROVASCULAR ACCIDENT): ICD-10-CM

## 2020-10-30 DIAGNOSIS — G89.29 CHRONIC MIDLINE LOW BACK PAIN WITHOUT SCIATICA: Primary | ICD-10-CM

## 2020-10-30 DIAGNOSIS — M96.1 POSTLAMINECTOMY SYNDROME, LUMBAR REGION: ICD-10-CM

## 2020-10-30 DIAGNOSIS — R26.89 BALANCE PROBLEMS: ICD-10-CM

## 2020-10-30 DIAGNOSIS — M54.50 CHRONIC MIDLINE LOW BACK PAIN WITHOUT SCIATICA: Primary | ICD-10-CM

## 2020-10-30 PROCEDURE — 99441 PR PHYSICIAN TELEPHONE EVALUATION 5-10 MIN: CPT | Mod: 95 | Performed by: FAMILY MEDICINE

## 2020-10-30 NOTE — PROGRESS NOTES
"Joann Gonzalez is a 60 year old female who is being evaluated via a billable telephone visit.      The patient has been notified of following:     \"This telephone visit will be conducted via a call between you and your physician/provider. We have found that certain health care needs can be provided without the need for a physical exam.  This service lets us provide the care you need with a short phone conversation.  If a prescription is necessary we can send it directly to your pharmacy.  If lab work is needed we can place an order for that and you can then stop by our lab to have the test done at a later time.    Telephone visits are billed at different rates depending on your insurance coverage. During this emergency period, for some insurers they may be billed the same as an in-person visit.  Please reach out to your insurance provider with any questions.    If during the course of the call the physician/provider feels a telephone visit is not appropriate, you will not be charged for this service.\"    Patient has given verbal consent for Telephone visit?  Yes    What phone number would you like to be contacted at? [phone]    How would you like to obtain your AVS? Mail a copy    Subjective     Joann Gonzalez is a 60 year old female who presents via phone visit today for the following health issues:    HPI     Concern - form    Description: pt states that she needs new handicap parking pass as hers is expiring      Has chronic low back pain and balance issues making it very difficult for her to walk a long distance.  She is on opioids and sees the pain clinic ([facility]).  Has had a handicap parking permit for a very long time; it has been very helpful.  She can still drive and has not been in any car accidents.    Review of Systems   CONSTITUTIONAL: NEGATIVE for fever, chills, change in weight  INTEGUMENTARY/SKIN: NEGATIVE for worrisome rashes, moles or lesions  EYES: NEGATIVE for vision changes or " irritation  ENT/MOUTH: NEGATIVE for ear, mouth and throat problems  RESP: NEGATIVE for significant cough or SOB  HEME: NEGATIVE for bleeding problems       Objective      Vitals:  No vitals were obtained today due to virtual visit.  GEN: healthy, alert and no distress  PSYCH: Alert and oriented times 3; coherent speech, normal   rate and volume, able to articulate logical thoughts, able   to abstract reason, no tangential thoughts, no hallucinations   or delusions  Her affect is normal  RESP: No cough, no audible wheezing, able to talk in full sentences  Remainder of exam unable to be completed due to telephone visits    No results found for this or any previous visit (from the past 24 hour(s)).        Assessment/Plan:    Assessment & Plan   Problem List Items Addressed This Visit     Chronic low back pain - Primary    Postlaminectomy syndrome, lumbar region    History of CVA (cerebrovascular accident) 10-12 w residual Rtdominant  sided weakness    Balance problems         Handicap parking permit form filled out and signed.  Pt will pick this up at the  today.    A physical exam with vitals was not possible today due to the COVID19 pandemic crisis for which we are trying to keep all patients safe and at home.  Clinical decision making was based on history as presented by the patient and answers to follow up questions as noted above.  Any lab orders that are needed will be put in as future orders so that the patient can come in for a lab only visit to minimize the amount of time spent in the clinic.      See Patient Instructions  Return in about 3 months (around 1/30/2021) for Routine Visit.    Isha Mak [facility]    Phone call duration:  8 minutes

## 2020-10-30 NOTE — TELEPHONE ENCOUNTER
RN called back to pt.    This was last done by Dr. Ospina about 4 years ago.  She needs a renewal for this placecard.     VV scheduled with covering provider to fill out disability parking.

## 2020-11-03 DIAGNOSIS — F32.5 MAJOR DEPRESSION IN COMPLETE REMISSION (H): ICD-10-CM

## 2020-11-04 RX ORDER — QUETIAPINE FUMARATE 100 MG/1
TABLET, FILM COATED ORAL
Qty: 90 TABLET | Refills: 0 | Status: SHIPPED | OUTPATIENT
Start: 2020-11-04 | End: 2021-02-04

## 2020-11-06 DIAGNOSIS — F32.5 MAJOR DEPRESSION IN COMPLETE REMISSION (H): ICD-10-CM

## 2020-11-06 NOTE — TELEPHONE ENCOUNTER
Patient needs refill on her Seroquel 25mg sent to her  Pharmacy(# [place] She is out and needs today. Her 100mg Seroquel was refilled 11/4/20, but not the 25mg.  Wendy Salazar LPN

## 2020-11-09 RX ORDER — QUETIAPINE FUMARATE 25 MG/1
25 TABLET, FILM COATED ORAL 2 TIMES DAILY
Qty: 180 TABLET | Refills: 0 | Status: SHIPPED | OUTPATIENT
Start: 2020-11-09 | End: 2021-02-04

## 2020-11-09 NOTE — TELEPHONE ENCOUNTER
Routing refill request to provider for review/approval because:  Labs not current:  CBC  Rx for 100 MG filled on 11/4 but not 25 MG

## 2021-01-31 DIAGNOSIS — F32.5 MAJOR DEPRESSION IN COMPLETE REMISSION (H): ICD-10-CM

## 2021-02-01 DIAGNOSIS — F32.5 MAJOR DEPRESSION IN COMPLETE REMISSION (H): ICD-10-CM

## 2021-02-02 RX ORDER — QUETIAPINE FUMARATE 25 MG/1
TABLET, FILM COATED ORAL
Qty: 180 TABLET | Refills: 0 | OUTPATIENT
Start: 2021-02-02

## 2021-02-04 DIAGNOSIS — F32.5 MAJOR DEPRESSION IN COMPLETE REMISSION (H): ICD-10-CM

## 2021-02-04 RX ORDER — QUETIAPINE FUMARATE 25 MG/1
TABLET, FILM COATED ORAL
Qty: 180 TABLET | Refills: 0 | Status: SHIPPED | OUTPATIENT
Start: 2021-02-04 | End: 2021-04-12

## 2021-02-04 RX ORDER — QUETIAPINE FUMARATE 100 MG/1
TABLET, FILM COATED ORAL
Qty: 90 TABLET | Refills: 0 | Status: SHIPPED | OUTPATIENT
Start: 2021-02-04 | End: 2021-04-12

## 2021-02-13 DIAGNOSIS — F41.1 ANXIETY STATE: ICD-10-CM

## 2021-02-13 DIAGNOSIS — F33.41 RECURRENT MAJOR DEPRESSION IN PARTIAL REMISSION (H): ICD-10-CM

## 2021-02-13 NOTE — LETTER
[facility]  [address] [address] [phone]      2/18/2021       Joann Gonzalez  [address] [address]        Dear Joann,  You are overdue for an Annual Eaxm before your prescriptions can be approved for refills. Please call to schedule at the number listed above.   Due to you Primary Physician (Dr Esau Ospina) retiring, you will need to schedule with one of his Fellow Partners either in person, by telephone or by video.       Sincerely,      [facility]   Internal Medicine

## 2021-02-18 NOTE — TELEPHONE ENCOUNTER
Routing refill request to provider for review/approval because:  Last PHQ-9 out of range for protocol

## 2021-03-25 DIAGNOSIS — I10 BENIGN ESSENTIAL HYPERTENSION: ICD-10-CM

## 2021-03-25 RX ORDER — AMLODIPINE BESYLATE 10 MG/1
TABLET ORAL
Qty: 90 TABLET | Refills: 0 | Status: SHIPPED | OUTPATIENT
Start: 2021-03-25 | End: 2021-06-17

## 2021-03-25 NOTE — LETTER
[facility]  [address] [id number] [phone]      3/26/2021       Joann Gonzalez  [address] [address]        Dear Joann,  You are overdue for an Annual Eaxm before your prescriptions can be approved for refills. Please call to schedule at the number listed above.   Due to you Primary Physician (Dr Esau Ospina) retiring, you will need to schedule with one of his Fellow Partners either in person, by telephone or by video.       Sincerely,      [facility]   Internal Medicine

## 2021-03-25 NOTE — TELEPHONE ENCOUNTER
This patient needs to establish care with a new provider.  Please help this patient set up an office visit with a new provider.         I refilled the Rx times one.

## 2021-03-25 NOTE — TELEPHONE ENCOUNTER
Routing refill request to provider for review/approval because:  No BP readings > 1 year.     Last not current;    Creatinine   Date Value Ref Range Status   02/03/2020 0.58 0.52 - 1.04 mg/dL Final

## 2021-04-10 PROBLEM — I25.10 CORONARY ARTERY CALCIFICATION SEEN ON CAT SCAN: Status: ACTIVE | Noted: 2021-04-10

## 2021-04-10 PROBLEM — Z98.84 HISTORY OF GASTRIC BYPASS: Status: ACTIVE | Noted: 2021-04-10

## 2021-04-10 PROBLEM — K91.2 POSTSURGICAL MALABSORPTION: Status: ACTIVE | Noted: 2021-04-10

## 2021-04-10 PROBLEM — R73.01 IFG (IMPAIRED FASTING GLUCOSE): Status: ACTIVE | Noted: 2021-04-10

## 2021-04-11 NOTE — PATIENT INSTRUCTIONS
Call [phone] to set up an appointment with [facility].     In the meantime, start taking pantoprazole, 15 minutes before breakfast, to decrease stomach acid.               I will let you know your lab results.         I will probably order a cholesterol lowering medication.               You are due for a mammogram.               Call [phone] to set up a COVID -19 vaccine.                                Please note that I am planning to retire on December 31, 2021.              You are welcome to continue your care through this clinic.              For a variety of reasons I will not refer you to a specific provider.

## 2021-04-12 ENCOUNTER — OFFICE VISIT (OUTPATIENT)
Dept: INTERNAL MEDICINE | Facility: CLINIC | Age: 61
End: 2021-04-12
Payer: MEDICARE

## 2021-04-12 VITALS
WEIGHT: 132 LBS | SYSTOLIC BLOOD PRESSURE: 112 MMHG | OXYGEN SATURATION: 97 % | DIASTOLIC BLOOD PRESSURE: 62 MMHG | HEART RATE: 72 BPM | BODY MASS INDEX: 25.78 KG/M2

## 2021-04-12 DIAGNOSIS — R10.13 ABDOMINAL PAIN, EPIGASTRIC: ICD-10-CM

## 2021-04-12 DIAGNOSIS — Z98.84 HISTORY OF GASTRIC BYPASS: ICD-10-CM

## 2021-04-12 DIAGNOSIS — F41.1 ANXIETY STATE: ICD-10-CM

## 2021-04-12 DIAGNOSIS — F32.5 MAJOR DEPRESSION IN COMPLETE REMISSION (H): ICD-10-CM

## 2021-04-12 DIAGNOSIS — Z86.73 HISTORY OF CVA (CEREBROVASCULAR ACCIDENT): ICD-10-CM

## 2021-04-12 DIAGNOSIS — K21.9 GASTROESOPHAGEAL REFLUX DISEASE WITHOUT ESOPHAGITIS: ICD-10-CM

## 2021-04-12 DIAGNOSIS — F33.41 RECURRENT MAJOR DEPRESSION IN PARTIAL REMISSION (H): ICD-10-CM

## 2021-04-12 DIAGNOSIS — I10 BENIGN ESSENTIAL HYPERTENSION: Primary | ICD-10-CM

## 2021-04-12 DIAGNOSIS — I25.10 CORONARY ARTERY CALCIFICATION SEEN ON CAT SCAN: ICD-10-CM

## 2021-04-12 DIAGNOSIS — K91.2 POSTSURGICAL MALABSORPTION: ICD-10-CM

## 2021-04-12 DIAGNOSIS — E78.5 HYPERLIPIDEMIA LDL GOAL <100: ICD-10-CM

## 2021-04-12 LAB
ALBUMIN SERPL-MCNC: 3.8 G/DL (ref 3.4–5)
ALP SERPL-CCNC: 147 U/L (ref 40–150)
ALT SERPL W P-5'-P-CCNC: 16 U/L (ref 0–50)
ANION GAP SERPL CALCULATED.3IONS-SCNC: <1 MMOL/L (ref 3–14)
AST SERPL W P-5'-P-CCNC: 19 U/L (ref 0–45)
BASOPHILS # BLD AUTO: 0 10E9/L (ref 0–0.2)
BASOPHILS NFR BLD AUTO: 0.5 %
BILIRUB SERPL-MCNC: 0.2 MG/DL (ref 0.2–1.3)
BUN SERPL-MCNC: 8 MG/DL (ref 7–30)
CALCIUM SERPL-MCNC: 9.3 MG/DL (ref 8.5–10.1)
CHLORIDE SERPL-SCNC: 103 MMOL/L (ref 94–109)
CHOLEST SERPL-MCNC: 222 MG/DL
CO2 SERPL-SCNC: 34 MMOL/L (ref 20–32)
CREAT SERPL-MCNC: 0.66 MG/DL (ref 0.52–1.04)
DIFFERENTIAL METHOD BLD: NORMAL
EOSINOPHIL # BLD AUTO: 0.1 10E9/L (ref 0–0.7)
EOSINOPHIL NFR BLD AUTO: 1 %
ERYTHROCYTE [DISTWIDTH] IN BLOOD BY AUTOMATED COUNT: 13.1 % (ref 10–15)
GFR SERPL CREATININE-BSD FRML MDRD: >90 ML/MIN/{1.73_M2}
GLUCOSE SERPL-MCNC: 112 MG/DL (ref 70–99)
HCT VFR BLD AUTO: 43.3 % (ref 35–47)
HDLC SERPL-MCNC: 80 MG/DL
HGB BLD-MCNC: 14.6 G/DL (ref 11.7–15.7)
LDLC SERPL CALC-MCNC: 127 MG/DL
LYMPHOCYTES # BLD AUTO: 1.1 10E9/L (ref 0.8–5.3)
LYMPHOCYTES NFR BLD AUTO: 18.2 %
MCH RBC QN AUTO: 30.4 PG (ref 26.5–33)
MCHC RBC AUTO-ENTMCNC: 33.7 G/DL (ref 31.5–36.5)
MCV RBC AUTO: 90 FL (ref 78–100)
MONOCYTES # BLD AUTO: 0.3 10E9/L (ref 0–1.3)
MONOCYTES NFR BLD AUTO: 5.5 %
NEUTROPHILS # BLD AUTO: 4.6 10E9/L (ref 1.6–8.3)
NEUTROPHILS NFR BLD AUTO: 74.8 %
NONHDLC SERPL-MCNC: 142 MG/DL
PLATELET # BLD AUTO: 261 10E9/L (ref 150–450)
POTASSIUM SERPL-SCNC: 4.5 MMOL/L (ref 3.4–5.3)
PROT SERPL-MCNC: 7.6 G/DL (ref 6.8–8.8)
RBC # BLD AUTO: 4.81 10E12/L (ref 3.8–5.2)
SODIUM SERPL-SCNC: 136 MMOL/L (ref 133–144)
TRIGL SERPL-MCNC: 76 MG/DL
VIT B12 SERPL-MCNC: 588 PG/ML (ref 193–986)
WBC # BLD AUTO: 6.2 10E9/L (ref 4–11)

## 2021-04-12 PROCEDURE — 80053 COMPREHEN METABOLIC PANEL: CPT | Performed by: INTERNAL MEDICINE

## 2021-04-12 PROCEDURE — 99214 OFFICE O/P EST MOD 30 MIN: CPT | Performed by: INTERNAL MEDICINE

## 2021-04-12 PROCEDURE — 85025 COMPLETE CBC W/AUTO DIFF WBC: CPT | Performed by: INTERNAL MEDICINE

## 2021-04-12 PROCEDURE — 80061 LIPID PANEL: CPT | Performed by: INTERNAL MEDICINE

## 2021-04-12 PROCEDURE — 82607 VITAMIN B-12: CPT | Performed by: INTERNAL MEDICINE

## 2021-04-12 PROCEDURE — 36415 COLL VENOUS BLD VENIPUNCTURE: CPT | Performed by: INTERNAL MEDICINE

## 2021-04-12 PROCEDURE — 82306 VITAMIN D 25 HYDROXY: CPT | Performed by: INTERNAL MEDICINE

## 2021-04-12 RX ORDER — QUETIAPINE FUMARATE 100 MG/1
TABLET, FILM COATED ORAL
Qty: 90 TABLET | Refills: 3 | Status: SHIPPED | OUTPATIENT
Start: 2021-04-12 | End: 2022-04-21

## 2021-04-12 RX ORDER — QUETIAPINE FUMARATE 25 MG/1
TABLET, FILM COATED ORAL
Qty: 180 TABLET | Refills: 3 | Status: SHIPPED | OUTPATIENT
Start: 2021-04-12 | End: 2021-05-02

## 2021-04-12 RX ORDER — LISINOPRIL 20 MG/1
20 TABLET ORAL DAILY
Qty: 90 TABLET | Refills: 3 | Status: SHIPPED | OUTPATIENT
Start: 2021-04-12 | End: 2022-04-21

## 2021-04-12 RX ORDER — PANTOPRAZOLE SODIUM 40 MG/1
40 TABLET, DELAYED RELEASE ORAL DAILY
Qty: 90 TABLET | Refills: 3 | Status: SHIPPED | OUTPATIENT
Start: 2021-04-12 | End: 2021-12-23

## 2021-04-12 NOTE — LETTER
"April 13, 2021      Joann Gonzalez  [address]   [facility] [address]        Dear ,    We are writing to inform you of your test results.            Your LDL or \"bad\" cholesterol should be less than 100.          I have sent a prescription(s) to your pharmacy for rosuvastatin 10 mg per day.                Your blood sugar (glucose) is slightly elevated at 112.    Please work on restricting carbohydrates ( starches, sweets, etc).       Your other lab results are normal,including the liver,kidney,vitamins D and B12, and the bone marrow function.       Results for orders placed or performed in visit on 04/12/21   Comprehensive metabolic panel (BMP + Alb, Alk Phos, ALT, AST, Total. Bili, TP)     Status: Abnormal   Result Value Ref Range    Sodium 136 133 - 144 mmol/L    Potassium 4.5 3.4 - 5.3 mmol/L    Chloride 103 94 - 109 mmol/L    Carbon Dioxide 34 (H) 20 - 32 mmol/L    Anion Gap <1 (L) 3 - 14 mmol/L    Glucose 112 (H) 70 - 99 mg/dL    Urea Nitrogen 8 7 - 30 mg/dL    Creatinine 0.66 0.52 - 1.04 mg/dL    GFR Estimate >90 >60 mL/min/[1.73_m2]    GFR Estimate If Black >90 >60 mL/min/[1.73_m2]    Calcium 9.3 8.5 - 10.1 mg/dL    Bilirubin Total 0.2 0.2 - 1.3 mg/dL    Albumin 3.8 3.4 - 5.0 g/dL    Protein Total 7.6 6.8 - 8.8 g/dL    Alkaline Phosphatase 147 40 - 150 U/L    ALT 16 0 - 50 U/L    AST 19 0 - 45 U/L   Vitamin D Deficiency     Status: None   Result Value Ref Range    Vitamin D Deficiency screening 33 20 - 75 ug/L   Vitamin B12     Status: None   Result Value Ref Range    Vitamin B12 588 193 - 986 pg/mL   CBC with platelets and differential     Status: None   Result Value Ref Range    WBC 6.2 4.0 - 11.0 10e9/L    RBC Count 4.81 3.8 - 5.2 10e12/L    Hemoglobin 14.6 11.7 - 15.7 g/dL    Hematocrit 43.3 35.0 - 47.0 %    MCV 90 78 - 100 fl    MCH 30.4 26.5 - 33.0 pg    MCHC 33.7 31.5 - 36.5 g/dL    RDW 13.1 10.0 - 15.0 %    Platelet Count 261 150 - 450 10e9/L    % Neutrophils 74.8 %    % " Lymphocytes 18.2 %    % Monocytes 5.5 %    % Eosinophils 1.0 %    % Basophils 0.5 %    Absolute Neutrophil 4.6 1.6 - 8.3 10e9/L    Absolute Lymphocytes 1.1 0.8 - 5.3 10e9/L    Absolute Monocytes 0.3 0.0 - 1.3 10e9/L    Absolute Eosinophils 0.1 0.0 - 0.7 10e9/L    Absolute Basophils 0.0 0.0 - 0.2 10e9/L    Diff Method Automated Method    Lipid Profile (Chol, Trig, HDL, LDL calc)     Status: Abnormal   Result Value Ref Range    Cholesterol 222 (H) <200 mg/dL    Triglycerides 76 <150 mg/dL    HDL Cholesterol 80 >49 mg/dL    LDL Cholesterol Calculated 127 (H) <100 mg/dL    Non HDL Cholesterol 142 (H) <130 mg/dL         If you have any questions or concerns, please call the clinic at the number listed above.       Sincerely,      Floyd Sal MD

## 2021-04-12 NOTE — PROGRESS NOTES
Assessment & Plan     Benign essential hypertension  Good control  - Comprehensive metabolic panel (BMP + Alb, Alk Phos, ALT, AST, Total. Bili, TP)  - Lipid Profile (Chol, Trig, HDL, LDL calc)    Abdominal pain, epigastric  Etiology uncertain.         Check labs.  Add PPI.  GI consult.  - pantoprazole (PROTONIX) 40 MG EC tablet  Dispense: 90 tablet; Refill: 3  - GASTROENTEROLOGY ADULT REF CONSULT ONLY    Gastroesophageal reflux disease without esophagitis    - Vitamin B12  - CBC with platelets and differential  - pantoprazole (PROTONIX) 40 MG EC tablet  Dispense: 90 tablet; Refill: 3  - GASTROENTEROLOGY ADULT REF CONSULT ONLY    Postsurgical malabsorption    - Vitamin D Deficiency  - GASTROENTEROLOGY ADULT REF CONSULT ONLY    History of CVA (cerebrovascular accident) 10-12 w residual Rtdominant  sided weakness  See the problem list.    Consider adding a statin    History of gastric bypass    - GASTROENTEROLOGY ADULT REF CONSULT ONLY    Recurrent major depression in partial remission (H)  Meds refilled  - FLUoxetine (PROZAC) 20 MG capsule  Dispense: 180 capsule; Refill: 3    Coronary artery calcification seen on CAT scan  Consider adding a statin  - Lipid Profile (Chol, Trig, HDL, LDL calc)    Major depression in complete remission (HCC) on meds   Meds refilled  - QUEtiapine (SEROQUEL) 100 MG tablet  Dispense: 90 tablet; Refill: 3  - QUEtiapine (SEROQUEL) 25 MG tablet  Dispense: 180 tablet; Refill: 3    Anxiety state  Meds refilled symptoms are stable  - FLUoxetine (PROZAC) 20 MG capsule  Dispense: 180 capsule; Refill: 3               Patient Instructions                  Call [phone] to set up an appointment with [facility].     In the meantime, start taking pantoprazole, 15 minutes before breakfast, to decrease stomach acid.               I will let you know your lab results.         I will probably order a cholesterol lowering medication.               You are due for a mammogram.               Call  [phone] to set up a COVID -19 vaccine.                                Please note that I am planning to retire on December 31, 2021.              You are welcome to continue your care through this clinic.              For a variety of reasons I will not refer you to a specific provider.                              Return in about 3 months (around 7/12/2021) for follow up of several issues.    Floyd Sal MD  [facility] Center[facility]ARYA David is a 60 year old who presents for the following health issues     HPI     Hypertension Follow-up      Do you check your blood pressure regularly outside of the clinic? No     Are you following a low salt diet? Yes    Are your blood pressures ever more than 140 on the top number (systolic) OR more   than 90 on the bottom number (diastolic), for example 140/90? No    Depression and Anxiety Follow-Up    How are you doing with your depression since your last visit? No change    How are you doing with your anxiety since your last visit?  Sometimes has more anxiety    Are you having other symptoms that might be associated with depression or anxiety? No    Have you had a significant life event? OTHER: brother has cancer     Do you have any concerns with your use of alcohol or other drugs? 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     Drug use: No     PHQ 7/12/2019 2/3/2020 7/13/2020   PHQ-9 Total Score 6 2 3   Q9: Thoughts of better off dead/self-harm past 2 weeks Not at all Not at all Not at all     PAU-7 SCORE 7/1/2019 7/12/2019 2/3/2020   Total Score - - -   Total Score 14 11 4   Total Score - - -     Last PHQ-9 7/13/2020   1.  Little interest or pleasure in doing things 0   2.  Feeling  down, depressed, or hopeless 2   3.  Trouble falling or staying asleep, or sleeping too much 0   4.  Feeling tired or having little energy 1   5.  Poor appetite or overeating 0   6.  Feeling bad about yourself 0   7.  Trouble concentrating 0   8.  Moving slowly or restless 0   Q9: Thoughts of better off dead/self-harm past 2 weeks 0   PHQ-9 Total Score 3   Difficulty at work, home, or with people Not difficult at all     PAU-7  2/3/2020   1. Feeling nervous, anxious, or on edge 1   2. Not being able to stop or control worrying 0   3. Worrying too much about different things 0   4. Trouble relaxing 1   5. Being so restless that it is hard to sit still 0   6. Becoming easily annoyed or irritable 1   7. Feeling afraid, as if something awful might happen 1   PAU-7 Total Score 4   If you checked any problems, how difficult have they made it for you to do your work, take care of things at home, or get along with other people? Not difficult at all       Suicide Assessment Five-step Evaluation and Treatment (SAFE-T)      How many servings of fruits and vegetables do you eat daily?  0-1    On average, how many sweetened beverages do you drink each day (Examples: soda, juice, sweet tea, etc.  Do NOT count diet or artificially sweetened beverages)?   0    How many days per week do you exercise enough to make your heart beat faster? 3 or less    How many minutes a day do you exercise enough to make your heart beat faster? 9 or less    How many days per week do you miss taking your medication? 0    Has been having more gas x 2 weeks.         This patient is transferring from Dr. Ospina's care.                                C/o abdominal bloating.          ? Some sx of bile reflux.       Her med list includes omeprazole from 2019, but she does not recall taking it.         She had an EGD 5 years ago that was negative.        S/p gastric bypass; lost 117 lbs.            Takes a MVI; no other vits.                   Goes to [facility]  has an intrathecal pump (morphine, Fentanyl, and another med).        She takes 1 oxycodone tablet per day.                                         Hx of a small CVA in the past.             Hx of coronary artery Ca++ seen on a CT scan.       Hx of HTN.     Ex smoker.                                                                                Review of Systems   RESP:NEGATIVE for significant cough or SOB  CV: NEGATIVE for chest pain, palpitations or peripheral edema  GI: NEGATIVE for hematochezia and melena      Current Outpatient Medications   Medication Sig Dispense Refill     amLODIPine (NORVASC) 10 MG tablet TAKE 1 TABLET(10 MG) BY MOUTH DAILY 90 tablet 0     clopidogrel (PLAVIX) 75 MG tablet TAKE 1 TABLET(75 MG) BY MOUTH DAILY 90 tablet 3     FLUoxetine (PROZAC) 20 MG capsule TAKE 2 CAPSULES BY MOUTH EVERY  capsule 0     lisinopril (ZESTRIL) 20 MG tablet Take 1 tablet (20 mg) by mouth daily 90 tablet 2     Multiple Vitamin (MULTI-VITAMIN) per tablet Take 1 tablet by mouth daily.       omeprazole (PRILOSEC) 20 MG DR capsule Take 1 capsule (20 mg) by mouth 2 times daily 20 capsule 0     oxyCODONE-acetaminophen (PERCOCET) 5-325 MG per tablet Take 1 tablet by mouth daily as needed for severe pain       polyethylene glycol (MIRALAX/GLYCOLAX) Packet Take 17 g by mouth 2 times daily 21 packet 1     QUEtiapine (SEROQUEL) 100 MG tablet TAKE 1 TABLET(100 MG) BY MOUTH AT BEDTIME 90 tablet 0     QUEtiapine (SEROQUEL) 25 MG tablet TAKE 1 TABLET(25 MG) BY MOUTH TWICE DAILY 180 tablet 0     Patient Active Problem List    Diagnosis Date Noted     Coronary artery calcification seen on CAT scan 04/10/2021     Priority: High     see CT 9/18       Benign essential hypertension 04/05/2016     Priority: High     History of CVA (cerebrovascular accident) 10-12 w residual Rtdominant  sided weakness 04/05/2016     Priority: High             DISCHARGE DIAGNOSES:   1.  Small right pontine stroke.   2.  Intracranial  atherosclerosis at M2.   3.  Uncontrolled hypertension.   4.  Hyperglycemia with an A1c of 6.1%.         SECONDARY DIAGNOSES:   1.  Chronic low back pain, status post spine surgery and requiring chronic Percocet.   2.  Depression.   3.  History of alcohol dependency in the past, in remission for last 3 years.   4.  Gastric bypass surgery.   5.  Insomnia.      CONSULTS OBTAINED IN THE HOSPITAL:   1.  Neurology.   2.  Cardiology.      LABORATORY ON DISCHARGE:  At time of discharge with hemoglobin is 12.8, white count of 4, platelets is 259.  Lipid panel shows cholesterol 153, LDL is 82, HDL 52.      IMAGING STUDIES DONE IN THE HOSPITAL:   1.  SHARON showed no clear source of embolism.  There is a fixed atheromatous plaque at the aortic root, ascending aorta is arched but no mobile elements or ulcers identified, no PFO shot, no CHELSEY thrombus.   2.  Echocardiogram:  LV is normal in size.  EF is 60-65%, grade I left ventricular diastolic dysfunction is noted.  No regional wall motion abnormalities, no significant valvular heart disease.   3.  MRI of the brain with and without contrast shows small lesion in the right side of inder.  This lesion does demonstrate contrast enhancement this is seen on the isotropic, this could be due to a subacute infarct.  Also, additional T2 hyperintensities in the white matter of both hemispheres and the left side of inder, more than 10 hyperintensities identified, which are nonspecific.   4.  Bilateral ultrasound carotids no significant stenosis seen at either carotid bifurcation.   5.  CT angiogram of head neck with and without contrast showed mild to moderate intracranial stenosis especially left M2 segment, no intracranial branch occlusion, aneurysm or dissection.  There is a mild stenosis at the origin of the right internal carotid artery.     6.  CT scan of the head with contrast shows normal CT perfusion scan.   7.  X-ray of the chest, one view was negative.   8.  CT scan of the head  without contrast is normal.      ADMITTING HISTORY:  Ms. Madhavi Gonzalez is a 52-year-old female with a history of hypertension and smoking history who presented with right-sided numbness and weakness.      COURSE IN THE HOSPITAL:   1.  Right-sided numbness and weakness.  She did have an allergy to aspirin and therefore she was started on Plavix.  By the time she returned from CT scan in the emergency room all her symptoms had completely resolved so she did not require TPA.  She had imaging studies as done above and MRI which showed a small pontine stroke.   2.  Hypertension.  She has been noted to be hypertensive, is compliant to her medications; however, she did state that her blood pressure has not been well controlled even prior to admission and therefore, new medication of amlodipine was added to this.   3.  Tobacco dependency.  She did not require a patch while in the hospital and thinks that she can quit successfully upon discharge.   4.  Hyperglycemia.  She had some elevated sugars, the highest of which was 226 on admission.  An A1c was done and this was 6.1%.                Depression, major, severe recurrence (H) 01/04/2015     Priority: High     Depression with suicidal ideation 01/03/2015     Priority: High     Chronic narcotic use / MAPPS since 9-14 12/30/2014     Priority: High     Encephalopathy acute 06/09/2013     Priority: High     IFG (impaired fasting glucose) 04/10/2021     Priority: Medium     History of gastric bypass 04/10/2021     Priority: Medium     Postsurgical malabsorption 04/10/2021     Priority: Medium     Obstructed internal hernia 09/29/2018     Priority: Medium     Ventral hernia without obstruction or gangrene 09/21/2017     Priority: Medium     Balance problems 09/21/2017     Priority: Medium     Recurrent major depression in partial remission (H) 04/05/2016     Priority: Medium     Overweight (BMI 25.0-29.9) 04/05/2016     Priority: Medium     Epigastric pain 07/11/2015      Priority: Medium     Back pain since 2005  -unknown etiol 12/30/2014     Priority: Medium     Overdose 07/10/2014     Priority: Medium     GERD (gastroesophageal reflux disease) 04/07/2014     Priority: Medium     EGD neg in 7/15       Migraine 01/31/2014     Priority: Medium     Problem list name updated by automated process. Provider to review       Anxiety 05/14/2013     Priority: Medium     Transient cerebral ischemia 11/01/2012     Priority: Medium     Diagnosis updated by automated process. Provider to review and confirm.       Postlaminectomy syndrome, lumbar region 10/06/2012     Priority: Medium     Insomnia 10/06/2012     Priority: Medium     Problem list name updated by automated process. Provider to review       Chronic low back pain 05/11/2011     Priority: Medium     History of colonic polyps 07/31/2009     Priority: Medium     1 tiny adenoma in 9/17; recheck in 5 years       Health Care Home 09/12/2013     Priority: Low     State Tier Level:    Status:  Declined  Care Coordinator:  Chetna Kaufman RN [facility]  [phone]  See Letters for [facility] Care Plan         Past Surgical History:   Procedure Laterality Date     ABDOMEN SURGERY  2008 - Gallbladder     APPENDECTOMY  1997    done with hysterectomy     BACK SURGERY  2010 x 3 - Fusion     BACK SURGERY       CHOLECYSTECTOMY       ESOPHAGOSCOPY, GASTROSCOPY, DUODENOSCOPY (EGD), COMBINED N/A 7/11/2015    Procedure: COMBINED ESOPHAGOSCOPY, GASTROSCOPY, DUODENOSCOPY (EGD);  Surgeon: Sree Scanlon MD;  Location:  GI     GASTRIC BYPASS  2003     GASTRIC BYPASS       GI SURGERY       HYSTERECTOMY  1997     HYSTERECTOMY, PAP NO LONGER INDICATED  1997    hysterectomy     LAPAROSCOPIC HERNIORRHAPHY INTERNAL  9/29/2018    Procedure: LAPAROSCOPIC HERNIORRHAPHY INTERNAL;;  Surgeon: Suki Harrison MD;  Location:  OR     LAPAROSCOPIC LYSIS ADHESIONS N/A 9/29/2018    Procedure: LAPAROSCOPIC LYSIS ADHESIONS;;  Surgeon: Suki Harrison MD;  Location:  OR      LAPAROSCOPY DIAGNOSTIC (GENERAL) N/A 9/29/2018    Procedure: LAPAROSCOPY DIAGNOSTIC (GENERAL);  Diagnostic Laparoscopy, Laparoscopic Lysis of Adhesion, Laparoscopic Internal Hernia Repair;  Surgeon: Suki Harrison MD;  Location: [facility] OR     Past Medical History:   Diagnosis Date     Depression      Hypertension      Migraine 12/2013     Overdose      Stroke (H)      Unspecified cerebral artery occlusion with cerebral infarction 10/20/2012     Social History     Socioeconomic History     Marital status:      Spouse name: Not on file     Number of children: Not on file     Years of education: Not on file     Highest education level: Not on file   Occupational History     Not on file   Social Needs     Financial resource strain: Not on file     Food insecurity     Worry: Not on file     Inability: Not on file     Transportation needs     Medical: Not on file     Non-medical: Not on file   Tobacco Use     Smoking status: Former Smoker     Packs/day: 1.00     Years: 15.00     Pack years: 15.00     Start date: 1/1/2018     Smokeless tobacco: Never Used   Substance and Sexual Activity     Alcohol use: No     Comment: 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     Drug use: No     Sexual activity: Not Currently     Partners: Male   Lifestyle     Physical activity     Days per week: Not on file     Minutes per session: Not on file     Stress: Not on file   Relationships     Social connections     Talks on phone: Not on file     Gets together: Not on file     Attends Ten[employer]ism service: Not on file     Active member of club or organization: Not on file     Attends meetings of clubs or organizations: Not on file     Relationship status: Not on file     Intimate partner violence     Fear of current or ex partner: Not on file     Emotionally abused: Not on file     Physically abused: Not on file     Forced sexual activity: Not on file   Other Topics Concern     Parent/sibling w/ CABG, MI or angioplasty before 65F  55M? No   Social History Narrative    ** Merged History Encounter **          Family History   Problem Relation Age of Onset     Alzheimer Disease Mother      Hypertension Mother      Endocrine Disease Father      Immunization History   Administered Date(s) Administered     Influenza (IIV3) PF 10/10/2012, 09/26/2013, 09/09/2014     Influenza Quad, Recombinant, p-free (RIV4) 02/03/2020     Influenza Vaccine IM > 6 months Valent IIV4 11/03/2016, 10/19/2017, 08/30/2018     Pneumo Conj 13-V (2010&after) 09/14/2015     TDAP Vaccine (Adacel) 06/21/2013, 04/05/2015     Tdap (Adacel,Boostrix) 03/10/2003       Objective    /62   Pulse 72   Wt 59.9 kg (132 lb)   LMP  (LMP Unknown)   SpO2 97%   BMI 25.78 kg/m    Body mass index is 25.78 kg/m .  Physical Exam   GENERAL APPEARANCE: alert and no distress  RESP: no rales or rhonchi  CV: regular rates and rhythm, normal S1 S2, no S3 or S4 and no murmur, click or rub  ABDOMEN: soft, nontender, without hepatosplenomegaly or masses and multiple surgical scars    Results for orders placed or performed in visit on 04/12/21   Comprehensive metabolic panel (BMP + Alb, Alk Phos, ALT, AST, Total. Bili, TP)     Status: Abnormal   Result Value Ref Range    Sodium 136 133 - 144 mmol/L    Potassium 4.5 3.4 - 5.3 mmol/L    Chloride 103 94 - 109 mmol/L    Carbon Dioxide 34 (H) 20 - 32 mmol/L    Anion Gap <1 (L) 3 - 14 mmol/L    Glucose 112 (H) 70 - 99 mg/dL    Urea Nitrogen 8 7 - 30 mg/dL    Creatinine 0.66 0.52 - 1.04 mg/dL    GFR Estimate >90 >60 mL/min/[1.73_m2]    GFR Estimate If Black >90 >60 mL/min/[1.73_m2]    Calcium 9.3 8.5 - 10.1 mg/dL    Bilirubin Total 0.2 0.2 - 1.3 mg/dL    Albumin 3.8 3.4 - 5.0 g/dL    Protein Total 7.6 6.8 - 8.8 g/dL    Alkaline Phosphatase 147 40 - 150 U/L    ALT 16 0 - 50 U/L    AST 19 0 - 45 U/L   Vitamin D Deficiency     Status: None   Result Value Ref Range    Vitamin D Deficiency screening 33 20 - 75 ug/L   Vitamin B12     Status: None   Result  "Value Ref Range    Vitamin B12 588 193 - 986 pg/mL   CBC with platelets and differential     Status: None   Result Value Ref Range    WBC 6.2 4.0 - 11.0 10e9/L    RBC Count 4.81 3.8 - 5.2 10e12/L    Hemoglobin 14.6 11.7 - 15.7 g/dL    Hematocrit 43.3 35.0 - 47.0 %    MCV 90 78 - 100 fl    MCH 30.4 26.5 - 33.0 pg    MCHC 33.7 31.5 - 36.5 g/dL    RDW 13.1 10.0 - 15.0 %    Platelet Count 261 150 - 450 10e9/L    % Neutrophils 74.8 %    % Lymphocytes 18.2 %    % Monocytes 5.5 %    % Eosinophils 1.0 %    % Basophils 0.5 %    Absolute Neutrophil 4.6 1.6 - 8.3 10e9/L    Absolute Lymphocytes 1.1 0.8 - 5.3 10e9/L    Absolute Monocytes 0.3 0.0 - 1.3 10e9/L    Absolute Eosinophils 0.1 0.0 - 0.7 10e9/L    Absolute Basophils 0.0 0.0 - 0.2 10e9/L    Diff Method Automated Method    Lipid Profile (Chol, Trig, HDL, LDL calc)     Status: Abnormal   Result Value Ref Range    Cholesterol 222 (H) <200 mg/dL    Triglycerides 76 <150 mg/dL    HDL Cholesterol 80 >49 mg/dL    LDL Cholesterol Calculated 127 (H) <100 mg/dL    Non HDL Cholesterol 142 (H) <130 mg/dL     Letter sent.                        Your LDL or \"bad\" cholesterol should be less than 100.          I have sent a prescription(s) to your pharmacy for rosuvastatin 10 mg per day.                Your blood sugar (glucose) is slightly elevated at 112.    Please work on restricting carbohydrates ( starches, sweets, etc).       Your other lab results are normal,including the liver,kidney,vitamins D and B12, and the bone marrow function.                       "

## 2021-04-13 PROBLEM — E78.5 HYPERLIPIDEMIA LDL GOAL <100: Status: ACTIVE | Noted: 2021-04-13

## 2021-04-13 LAB — DEPRECATED CALCIDIOL+CALCIFEROL SERPL-MC: 33 UG/L (ref 20–75)

## 2021-04-13 RX ORDER — ROSUVASTATIN CALCIUM 10 MG/1
10 TABLET, COATED ORAL DAILY
Qty: 90 TABLET | Refills: 1 | Status: SHIPPED | OUTPATIENT
Start: 2021-04-13 | End: 2022-04-21

## 2021-04-30 ENCOUNTER — IMMUNIZATION (OUTPATIENT)
Dept: NURSING | Facility: CLINIC | Age: 61
End: 2021-04-30
Payer: MEDICARE

## 2021-04-30 PROCEDURE — 0001A PR COVID VAC PFIZER DIL RECON 30 MCG/0.3 ML IM: CPT

## 2021-04-30 PROCEDURE — 91300 PR COVID VAC PFIZER DIL RECON 30 MCG/0.3 ML IM: CPT

## 2021-05-02 DIAGNOSIS — F32.5 MAJOR DEPRESSION IN COMPLETE REMISSION (H): ICD-10-CM

## 2021-05-02 RX ORDER — QUETIAPINE FUMARATE 25 MG/1
TABLET, FILM COATED ORAL
Qty: 180 TABLET | Refills: 3 | Status: SHIPPED | OUTPATIENT
Start: 2021-05-02 | End: 2022-04-21

## 2021-05-21 ENCOUNTER — IMMUNIZATION (OUTPATIENT)
Dept: NURSING | Facility: CLINIC | Age: 61
End: 2021-05-21
Attending: INTERNAL MEDICINE
Payer: MEDICARE

## 2021-05-21 PROCEDURE — 91300 PR COVID VAC PFIZER DIL RECON 30 MCG/0.3 ML IM: CPT

## 2021-05-21 PROCEDURE — 0002A PR COVID VAC PFIZER DIL RECON 30 MCG/0.3 ML IM: CPT

## 2021-06-17 DIAGNOSIS — I10 BENIGN ESSENTIAL HYPERTENSION: ICD-10-CM

## 2021-06-17 RX ORDER — AMLODIPINE BESYLATE 10 MG/1
TABLET ORAL
Qty: 90 TABLET | Refills: 2 | Status: SHIPPED | OUTPATIENT
Start: 2021-06-17 | End: 2021-12-17

## 2021-08-13 ENCOUNTER — TRANSFERRED RECORDS (OUTPATIENT)
Dept: HEALTH INFORMATION MANAGEMENT | Facility: CLINIC | Age: 61
End: 2021-08-13

## 2021-08-13 DIAGNOSIS — G45.9 TRANSIENT CEREBRAL ISCHEMIA, UNSPECIFIED TYPE: ICD-10-CM

## 2021-08-13 RX ORDER — CLOPIDOGREL BISULFATE 75 MG/1
TABLET ORAL
Qty: 90 TABLET | Refills: 3 | OUTPATIENT
Start: 2021-08-13

## 2021-08-27 DIAGNOSIS — G45.9 TRANSIENT CEREBRAL ISCHEMIA, UNSPECIFIED TYPE: ICD-10-CM

## 2021-08-27 RX ORDER — CLOPIDOGREL BISULFATE 75 MG/1
TABLET ORAL
Qty: 90 TABLET | Refills: 3 | Status: SHIPPED | OUTPATIENT
Start: 2021-08-27 | End: 2022-05-24

## 2021-10-19 PROBLEM — F32.9 MAJOR DEPRESSION: Status: ACTIVE | Noted: 2021-04-12

## 2021-12-17 DIAGNOSIS — I10 BENIGN ESSENTIAL HYPERTENSION: ICD-10-CM

## 2021-12-17 RX ORDER — AMLODIPINE BESYLATE 10 MG/1
TABLET ORAL
Qty: 90 TABLET | Refills: 2 | Status: SHIPPED | OUTPATIENT
Start: 2021-12-17 | End: 2022-12-14

## 2021-12-21 ENCOUNTER — VIRTUAL VISIT (OUTPATIENT)
Dept: FAMILY MEDICINE | Facility: CLINIC | Age: 61
End: 2021-12-21
Payer: MEDICARE

## 2021-12-21 DIAGNOSIS — R50.9 FEVER, UNSPECIFIED FEVER CAUSE: Primary | ICD-10-CM

## 2021-12-21 PROCEDURE — 99213 OFFICE O/P EST LOW 20 MIN: CPT | Mod: 95 | Performed by: NURSE PRACTITIONER

## 2021-12-21 NOTE — PROGRESS NOTES
Joann is a 61 year old who is being evaluated via a billable telephone visit.      What phone number would you like to be contacted at? [phone]  How would you like to obtain your AVS? Mail a copy    Assessment & Plan       Fever, unspecified fever cause: due to sudden onset of symptoms suspect influenza, will also check for COVID and strep.  Is aware of need to self isolate until test results return.  Continue to rest, take tylenol for fever/myalgia and increase fluid intake.   - Symptomatic; Yes; 12/21/2021 COVID-19 Virus (Coronavirus) by PCR  - Influenza A & B Antigen - Clinic Collect  - Streptococcus A Rapid Scr w Reflx to PCR - Lab Collect    -         [id number]}     FUTURE APPOINTMENTS:       - Follow-up visit in 1 day for testing.    Susan Haase, APRN [facility]   Jonan is a 61 year old who presents for the following health issues     HPI       Concern for COVID-19  About how many days ago did these symptoms start? Today  Is this your first visit for this illness? Yes  In the 14 days before your symptoms started, have you had close contact with someone with COVID-19 (Coronavirus)? No  Do you have a fever or chills? Yes, the highest temperature was 100.2  Are you having new or worsening difficulty breathing? No  Do you have new or worsening cough? Yes, it's a dry cough.   Have you had any new or unexplained body aches? YES    Have you experienced any of the following NEW symptoms?    Headache: YES    Sore throat: YES    Loss of taste or smell: No    Chest pain: No    Diarrhea: No    Rash: No  What treatments have you tried? Tylenol  Who do you live with?   Are you, or a household member, a healthcare worker or a ? No  Do you live in a nursing home, group home, or shelter? No  Do you have a way to get food/medications if quarantined? Yes, I have a friend or family member who can help me.    Woke up during the night not feeling well, headache  with body aches, temp 100.2, dry cough.  Has taken tylenol for headache with some relief.  Denies others in the home with illness. Is drinking fluids in adequate amounts.  Denies shortness of breath or chest pain.      Review of Systems   CONSTITUTIONAL: NEGATIVE for change in weight. See HPI  ENT/MOUTH: NEGATIVE for ear, mouth  Problems. See HPI  RESP: NEGATIVE for SOB.  See HPI  CV: NEGATIVE for chest pain, palpitations or peripheral edema  MUSCULOSKELETAL: see HPI      Objective    Vitals - Patient Reported  Systolic (Patient Reported): 120  Diastolic (Patient Reported): 77  Weight (Patient Reported): 59 kg (130 lb)  Height (Patient Reported): 152.4 cm (5')  BMI (Based on Pt Reported Ht/Wt): 25.39  Temperature (Patient Reported): 98.8  F (37.1  C)      Vitals:  No vitals were obtained today due to virtual visit.    Physical Exam     PSYCH: Alert and oriented times 3; coherent speech, normal   rate and volume, able to articulate logical thoughts, able   to abstract reason, no tangential thoughts, no hallucinations   or delusions  Her affect is normal and pleasant  RESP: No cough, no audible wheezing, able to talk in full sentences  Remainder of exam unable to be completed due to telephone visits            Phone call duration: 6 minutes

## 2021-12-22 ENCOUNTER — TELEPHONE (OUTPATIENT)
Dept: INTERNAL MEDICINE | Facility: CLINIC | Age: 61
End: 2021-12-22

## 2021-12-22 ENCOUNTER — LAB (OUTPATIENT)
Dept: URGENT CARE | Facility: URGENT CARE | Age: 61
End: 2021-12-22
Attending: NURSE PRACTITIONER
Payer: MEDICARE

## 2021-12-22 DIAGNOSIS — R10.13 ABDOMINAL PAIN, EPIGASTRIC: ICD-10-CM

## 2021-12-22 DIAGNOSIS — R50.9 FEVER, UNSPECIFIED FEVER CAUSE: ICD-10-CM

## 2021-12-22 DIAGNOSIS — K21.9 GASTROESOPHAGEAL REFLUX DISEASE WITHOUT ESOPHAGITIS: ICD-10-CM

## 2021-12-22 LAB
DEPRECATED S PYO AG THROAT QL EIA: NEGATIVE
FLUAV AG SPEC QL IA: NEGATIVE
FLUBV AG SPEC QL IA: NEGATIVE
GROUP A STREP BY PCR: NOT DETECTED

## 2021-12-22 PROCEDURE — 87651 STREP A DNA AMP PROBE: CPT

## 2021-12-22 PROCEDURE — U0005 INFEC AGEN DETEC AMPLI PROBE: HCPCS

## 2021-12-22 PROCEDURE — 87804 INFLUENZA ASSAY W/OPTIC: CPT | Performed by: NURSE PRACTITIONER

## 2021-12-22 PROCEDURE — U0003 INFECTIOUS AGENT DETECTION BY NUCLEIC ACID (DNA OR RNA); SEVERE ACUTE RESPIRATORY SYNDROME CORONAVIRUS 2 (SARS-COV-2) (CORONAVIRUS DISEASE [COVID-19]), AMPLIFIED PROBE TECHNIQUE, MAKING USE OF HIGH THROUGHPUT TECHNOLOGIES AS DESCRIBED BY CMS-2020-01-R: HCPCS

## 2021-12-23 ENCOUNTER — TELEPHONE (OUTPATIENT)
Dept: URGENT CARE | Facility: URGENT CARE | Age: 61
End: 2021-12-23
Payer: MEDICARE

## 2021-12-23 LAB — SARS-COV-2 RNA RESP QL NAA+PROBE: POSITIVE

## 2021-12-23 RX ORDER — PANTOPRAZOLE SODIUM 40 MG/1
TABLET, DELAYED RELEASE ORAL
Qty: 90 TABLET | Refills: 1 | Status: SHIPPED | OUTPATIENT
Start: 2021-12-23 | End: 2022-09-13

## 2021-12-23 NOTE — TELEPHONE ENCOUNTER
Called patient and advised of below.  Patient states not feeling as bad today as Wednesday.  Discussed isolating self away from  or other's in the house.  Beverly Lisa RN

## 2021-12-23 NOTE — TELEPHONE ENCOUNTER
Coronavirus (COVID-19) Notification    Reason for call  Notify of POSITIVE  COVID-19 lab result, assess symptoms,  review [facility] recommendations    Lab Result   Lab test for 2019-nCoV rRt-PCR or SARS-COV-2 PCR  Oropharyngeal AND/OR nasopharyngeal swabs were POSITIVE for 2019-nCoV RNA [OR] SARS-COV-2 RNA (COVID-19) RNA     We have been unable to reach Patient by phone at this time to notify of their Positive COVID-19 result.  Left voicemail message requesting a call back to [phone] [facility] for results.        POSITIVE COVID-19 Letter sent.    Anais Fragoso LPN

## 2021-12-23 NOTE — TELEPHONE ENCOUNTER
Prescription approved per [facility] Refill Protocol.  Volodymyr Hernandez RN  [facility] Triage Nurse

## 2021-12-23 NOTE — TELEPHONE ENCOUNTER
Please call Joann and let her know her COVID is positive,  Her strep and influenza tests are negative. I completed an evisit with her on 12/20 and she does not have [facility]. See how her symptoms are today.  Thanks,  Susan Haase, CNP

## 2021-12-23 NOTE — TELEPHONE ENCOUNTER
"-Coronavirus (COVID-19) Notification    Caller Name (Patient, parent, daughter/son, grandparent, etc)  Patient     Reason for call  Notify of Positive Coronavirus (COVID-19) lab results, assess symptoms,  review  [facility] recommendations    Lab Result    Lab test:  2019-nCoV rRt-PCR or SARS-CoV-2 PCR    Oropharyngeal AND/OR nasopharyngeal swabs is POSITIVE for 2019-nCoV RNA/SARS-COV-2 PCR (COVID-19 virus)    RN Recommendations/Instructions per [facility] Coronavirus COVID-19 recommendations    Brief introduction script  Introduce self then review script:  \"I am calling on behalf of [employer].  We were notified that your Coronavirus test (COVID-19) for was POSITIVE for the virus.  I have some information to relay to you but first I wanted to mention that the [place] Dept of Health will be contacting you shortly [it's possible MD already called Patient] to talk to you more about how you are feeling and other people you have had contact with who might now also have the virus.  Also,  [facility] is Partnering with the [facility] for Covid-19 research, you may be contacted directly by research staff.\"    Assessment (Inquire about Patient's current symptoms)   Assessment   Current Symptoms at time of phone call: (if no symptoms, document No symptoms] Lightheaded, body aches, sore throat   Symptoms onset (if applicable) 2 days ago     If at time of call, Patients symptoms hare worsened, the Patient should contact 911 or have someone drive them to Emergency Dept promptly:      If Patient calling 911, inform 911 personal that you have tested positive for the Coronavirus (COVID-19).  Place mask on and await 911 to arrive.    If Emergency Dept, If possible, please have another adult drive you to the Emergency Dept but you need to wear mask when in contact with other people.      Monoclonal Antibody Administration    You may be eligible to receive a new treatment with a monoclonal antibody for " "preventing hospitalization in patients at high risk for complications from COVID-19.   This medication is still experimental and available on a limited basis; it is given through an IV and must be given at an infusion center. Please note that not all people who are eligible will receive the medication since it is in limited supply.     Are you interested in being considered for this medication?  No.   Does the patient fit the criteria: Patient declined    If patient qualifies based on above criteria:  \"You will be contacted if you are selected to receive this treatment in the next 1-2 business days.   This is time sensitive and if you are not selected in the next 1-2 business days, you will not receive the medication.  If you do not receive a call to schedule, you have not been selected.\"      Review information with Patient    Your result was positive. This means you have COVID-19 (coronavirus).  We have sent you a letter that reviews the information that I'll be reviewing with you now.    How can I protect others?    If you have symptoms: stay home and away from others (self-isolate) until:    You've had no fever--and no medicine that reduces fever--for 1 full day (24 hours). And       Your other symptoms have gotten better. For example, your cough or breathing has improved. And     At least 10 days have passed since your symptoms started. (If you've been told by a doctor that you have a weak immune system, wait 20 days.)     If you don't have symptoms: Stay home and away from others (self-isolate) until at least 10 days have passed since your first positive COVID-19 test. (Date test collected)    During this time:    Stay in your own room, including for meals. Use your own bathroom if you can.    Stay away from others in your home. No hugging, kissing or shaking hands. No visitors.     Don't go to work, school or anywhere else.     Clean  high touch  surfaces often (doorknobs, counters, handles, etc.). Use a " household cleaning spray or wipes. You'll find a full list on the EPA website at www.epa.gov/pesticide-registration/list-n-disinfectants-use-against-sars-cov-2.     Cover your mouth and nose with a mask, tissue or other face covering to avoid spreading germs.    Wash your hands and face often with soap and water.    Make a list of people you have been in close contact with recently, even if either of you wore a face covering.   ; Start your list from 2 days before you became ill or had a positive test.  ; Include anyone that was within 6 feet of you for a cumulative total of 15 minutes or more in 24 hours. (Example: if you sat next to Weston for 5 minutes in the morning and 10 minutes in the afternoon, then you were in close contact for 15 minutes total that day. Weston would be added to your list.)    A public health worker will call or text you. It is important that you answer. They will ask you questions about possible exposures to COVID-19, such as people you have been in direct contact with and places you have visited.    Tell the people on your list that you have COVID-19; they should stay away from others for 14 days starting from the last time they were in contact with you (unless you are told something different from a public health worker).     Caregivers in these groups are at risk for severe illness due to COVID-19:  o People 65 years and older  o People who live in a nursing home or long-term care facility  o People with chronic disease (lung, heart, cancer, diabetes, kidney, liver, immunologic)  o People who have a weakened immune system, including those who:  - Are in cancer treatment  - Take medicine that weakens the immune system, such as corticosteroids  - Had a bone marrow or organ transplant  - Have an immune deficiency  - Have poorly controlled HIV or AIDS  - Are obese (body mass index of 40 or higher)  - Smoke regularly    Caregivers should wear gloves while washing dishes, handling laundry and  cleaning bedrooms and bathrooms.    Wash and dry laundry with special caution. Don't shake dirty laundry, and use the warmest water setting you can.    If you have a weakened immune system, ask your doctor about other actions you should take.    For more tips, go to www.cdc.gov/coronavirus/2019-ncov/downloads/10Things.pdf.    You should not go back to work until you meet the guidelines above for ending your home isolation. You don't need to be retested for COVID-19 before going back to work--studies show that you won't spread the virus if it's been at least 10 days since your symptoms started (or 20 days, if you have a weak immune system).    Employers: This document serves as formal notice of your employee's medical guidelines for going back to work. They must meet the above guidelines before going back to work in person.    How can I take care of myself?    1. Get lots of rest. Drink extra fluids (unless a doctor has told you not to).    2. Take Tylenol (acetaminophen) for fever or pain. If you have liver or kidney problems, ask your family doctor if it's okay to take Tylenol.     Take either:     650 mg (two 325 mg pills) every 4 to 6 hours, or     1,000 mg (two 500 mg pills) every 8 hours as needed.     Note: Don't take more than 3,000 mg in one day. Acetaminophen is found in many medicines (both prescribed and over-the-counter medicines). Read all labels to be sure you don't take too much.    For children, check the Tylenol bottle for the right dose (based on their age or weight).    3. If you have other health problems (like cancer, heart failure, an organ transplant or severe kidney disease): Call your specialty clinic if you don't feel better in the next 2 days.    4. Know when to call 911: Emergency warning signs include:    Trouble breathing or shortness of breath    Pain or pressure in the chest that doesn't go away    Feeling confused like you haven't felt before, or not being able to wake  up    Bluish-colored lips or face    5. Sign up for [employer]. We know it's scary to hear that you have COVID-19. We want to track your symptoms to make sure you're okay over the next 2 weeks. Please look for an email from [employer]--this is a free, online program that we'll use to keep in touch. To sign up, follow the link in the email. Learn more at [email or web address].    Where can I get more information?    [facility]: [email or web address]    Coronavirus Basics: www.[email or web address]./diseases/coronavirus/basics.html    What to Do If You're Sick: www.cdc.gov/coronavirus/2019-ncov/about/steps-when-sick.html    Ending Home Isolation: www.cdc.gov/coronavirus/2019-ncov/hcp/disposition-in-home-patients.html     Caring for Someone with COVID-19: www.cdc.gov/coronavirus/2019-ncov/if-you-are-sick/care-for-someone.html     [facility] clinical trials (COVID-19 research studies): [email or web address]-[email or web address]-[email or web address]     A Positive COVID-19 letter will be sent via [employer] or the mail. (Exception, no letters sent to Presurgerical/Preprocedure Patients)    Rowena Root LPN

## 2022-02-05 DIAGNOSIS — F41.1 ANXIETY STATE: ICD-10-CM

## 2022-02-05 DIAGNOSIS — F33.41 RECURRENT MAJOR DEPRESSION IN PARTIAL REMISSION (H): ICD-10-CM

## 2022-02-07 NOTE — TELEPHONE ENCOUNTER
Routing refill request to provider for review/approval because:   PHQ-9 score less than 5 in past 6 months    Recent (6 mo) or future (30 days) visit within the authorizing provider's specialty     PHQ-9 score:    PHQ 7/13/2020   PHQ-9 Total Score 3   Q9: Thoughts of better off dead/self-harm past 2 weeks Not at all     Medication last filled by Dr. Sal. Pt needs to establish care with new PCP.     Leeann Naik RN

## 2022-04-15 DIAGNOSIS — F32.5 MAJOR DEPRESSION IN COMPLETE REMISSION (H): ICD-10-CM

## 2022-04-18 RX ORDER — QUETIAPINE FUMARATE 25 MG/1
TABLET, FILM COATED ORAL
Qty: 180 TABLET | Refills: 0 | OUTPATIENT
Start: 2022-04-18

## 2022-04-18 RX ORDER — QUETIAPINE FUMARATE 100 MG/1
TABLET, FILM COATED ORAL
Qty: 90 TABLET | Refills: 0 | OUTPATIENT
Start: 2022-04-18

## 2022-04-18 NOTE — TELEPHONE ENCOUNTER
Pt called to check on this, states she needs the 25mg in addition to 100mg Quetiapine     Last OV 4/12/21     Appointments in Next Year    Apr 21, 2022  2:00 PM  (Arrive by 1:40 PM)  Provider Visit with ROMEO Tian CNP  [facility] ([facility] ) [phone]        Routing refill request to provider for review/approval because:  Pt is OUT of meds, pt has visit scheduled but med requires 6 month follow up, last visit over a year ago   Sandhya DALY Triage RN  [facility]       Requested Prescriptions   Pending Prescriptions Disp Refills     QUEtiapine (SEROQUEL) 100 MG tablet 90 tablet 3     Sig: TAKE 1 TABLET(100 MG) BY MOUTH AT BEDTIME       Antipsychotic Medications Failed - 4/15/2022 10:50 AM        Failed - Lipid panel on file within the past 12 months     Recent Labs   Lab Test 04/12/21  1258   CHOL 222*   TRIG 76   HDL 80   *   NHDL 142*               Failed - CBC on file in past 12 months     Recent Labs   Lab Test 04/12/21  1258   WBC 6.2   RBC 4.81   HGB 14.6   HCT 43.3                    Failed - Heart Rate on file within past 12 months     Pulse Readings from Last 3 Encounters:   04/12/21 72   02/03/20 66   07/12/19 72               Failed - A1c or Glucose on file in past 12 months     Recent Labs   Lab Test 04/12/21  1258 07/16/14  1530 07/11/14  0410   *   < > 105*   A1C  --   --  5.8    < > = values in this interval not displayed.       Please review patients last 3 weights. If a weight gain of >10 lbs exists, you may refill the prescription once after instructing the patient to schedule an appointment within the next 30 days.    Wt Readings from Last 3 Encounters:   04/12/21 59.9 kg (132 lb)   02/03/20 54.9 kg (121 lb)   10/17/19 54.9 kg (121 lb)             Failed - Recent (6 mo) or future (30 days) visit within the authorizing provider's specialty     Patient had  "office visit in the last 6 months or has a visit in the next 30 days with authorizing provider or within the authorizing provider's specialty.  See \"Patient Info\" tab in inbasket, or \"Choose Columns\" in Meds & Orders section of the refill encounter.            Passed - Blood pressure under 140/90 in past 12 months     BP Readings from Last 3 Encounters:   04/12/21 112/62   02/03/20 130/60   10/17/19 133/63                 Passed - Patient is 12 years of age or older        Passed - Medication is active on med list        Passed - Patient is not pregnant        Passed - No positve pregnancy test on file in past 12 months             "

## 2022-04-18 NOTE — TELEPHONE ENCOUNTER
I have never seen her before- I see she has an appt but am not willing to refill without meeting her  ROMEO Tian CNP

## 2022-04-21 ENCOUNTER — OFFICE VISIT (OUTPATIENT)
Dept: INTERNAL MEDICINE | Facility: CLINIC | Age: 62
End: 2022-04-21
Payer: MEDICARE

## 2022-04-21 VITALS
DIASTOLIC BLOOD PRESSURE: 60 MMHG | OXYGEN SATURATION: 98 % | HEIGHT: 60 IN | BODY MASS INDEX: 27.88 KG/M2 | WEIGHT: 142 LBS | TEMPERATURE: 98.6 F | HEART RATE: 95 BPM | SYSTOLIC BLOOD PRESSURE: 102 MMHG

## 2022-04-21 DIAGNOSIS — K91.2 POSTSURGICAL MALABSORPTION: Primary | ICD-10-CM

## 2022-04-21 DIAGNOSIS — Z23 HIGH PRIORITY FOR 2019 NOVEL CORONAVIRUS VACCINATION: ICD-10-CM

## 2022-04-21 DIAGNOSIS — Z86.73 HISTORY OF CVA (CEREBROVASCULAR ACCIDENT): ICD-10-CM

## 2022-04-21 DIAGNOSIS — E78.5 HYPERLIPIDEMIA LDL GOAL <100: ICD-10-CM

## 2022-04-21 DIAGNOSIS — F32.5 MAJOR DEPRESSION IN COMPLETE REMISSION (H): ICD-10-CM

## 2022-04-21 DIAGNOSIS — F41.1 ANXIETY STATE: ICD-10-CM

## 2022-04-21 DIAGNOSIS — Z00.00 MEDICARE ANNUAL WELLNESS VISIT, SUBSEQUENT: ICD-10-CM

## 2022-04-21 DIAGNOSIS — Z11.4 SCREENING FOR HIV (HUMAN IMMUNODEFICIENCY VIRUS): ICD-10-CM

## 2022-04-21 DIAGNOSIS — F11.20 CHRONIC NARCOTIC DEPENDENCE (H): Chronic | ICD-10-CM

## 2022-04-21 DIAGNOSIS — Z79.899 ENCOUNTER FOR LONG-TERM (CURRENT) USE OF MEDICATIONS: ICD-10-CM

## 2022-04-21 DIAGNOSIS — Z98.84 HISTORY OF GASTRIC BYPASS: ICD-10-CM

## 2022-04-21 DIAGNOSIS — Z12.31 ENCOUNTER FOR SCREENING MAMMOGRAM FOR BREAST CANCER: ICD-10-CM

## 2022-04-21 DIAGNOSIS — I10 BENIGN ESSENTIAL HYPERTENSION: ICD-10-CM

## 2022-04-21 DIAGNOSIS — R10.11 RUQ ABDOMINAL PAIN: ICD-10-CM

## 2022-04-21 LAB
ALBUMIN SERPL-MCNC: 3.6 G/DL (ref 3.4–5)
ALP SERPL-CCNC: 129 U/L (ref 40–150)
ALT SERPL W P-5'-P-CCNC: 21 U/L (ref 0–50)
ANION GAP SERPL CALCULATED.3IONS-SCNC: 3 MMOL/L (ref 3–14)
AST SERPL W P-5'-P-CCNC: 19 U/L (ref 0–45)
BILIRUB SERPL-MCNC: 0.3 MG/DL (ref 0.2–1.3)
BUN SERPL-MCNC: 6 MG/DL (ref 7–30)
CALCIUM SERPL-MCNC: 9.3 MG/DL (ref 8.5–10.1)
CHLORIDE BLD-SCNC: 105 MMOL/L (ref 94–109)
CHOLEST SERPL-MCNC: 208 MG/DL
CO2 SERPL-SCNC: 30 MMOL/L (ref 20–32)
CREAT SERPL-MCNC: 0.72 MG/DL (ref 0.52–1.04)
ERYTHROCYTE [DISTWIDTH] IN BLOOD BY AUTOMATED COUNT: 13.4 % (ref 10–15)
FASTING STATUS PATIENT QL REPORTED: YES
FASTING STATUS PATIENT QL REPORTED: YES
GFR SERPL CREATININE-BSD FRML MDRD: >90 ML/MIN/1.73M2
GLUCOSE BLD-MCNC: 108 MG/DL (ref 70–99)
GLUCOSE BLD-MCNC: 108 MG/DL (ref 70–99)
HCT VFR BLD AUTO: 45.8 % (ref 35–47)
HDLC SERPL-MCNC: 63 MG/DL
HGB BLD-MCNC: 15 G/DL (ref 11.7–15.7)
HIV 1+2 AB+HIV1 P24 AG SERPL QL IA: NONREACTIVE
LDLC SERPL CALC-MCNC: 118 MG/DL
MCH RBC QN AUTO: 30.1 PG (ref 26.5–33)
MCHC RBC AUTO-ENTMCNC: 32.8 G/DL (ref 31.5–36.5)
MCV RBC AUTO: 92 FL (ref 78–100)
NONHDLC SERPL-MCNC: 145 MG/DL
PLATELET # BLD AUTO: 282 10E3/UL (ref 150–450)
POTASSIUM BLD-SCNC: 4.7 MMOL/L (ref 3.4–5.3)
PROT SERPL-MCNC: 7.6 G/DL (ref 6.8–8.8)
RBC # BLD AUTO: 4.99 10E6/UL (ref 3.8–5.2)
SODIUM SERPL-SCNC: 138 MMOL/L (ref 133–144)
TRIGL SERPL-MCNC: 133 MG/DL
VIT B12 SERPL-MCNC: 688 PG/ML (ref 193–986)
WBC # BLD AUTO: 7 10E3/UL (ref 4–11)

## 2022-04-21 PROCEDURE — 80053 COMPREHEN METABOLIC PANEL: CPT | Performed by: NURSE PRACTITIONER

## 2022-04-21 PROCEDURE — 82607 VITAMIN B-12: CPT | Performed by: NURSE PRACTITIONER

## 2022-04-21 PROCEDURE — 36415 COLL VENOUS BLD VENIPUNCTURE: CPT | Performed by: NURSE PRACTITIONER

## 2022-04-21 PROCEDURE — 93000 ELECTROCARDIOGRAM COMPLETE: CPT | Performed by: NURSE PRACTITIONER

## 2022-04-21 PROCEDURE — 87389 HIV-1 AG W/HIV-1&-2 AB AG IA: CPT | Performed by: NURSE PRACTITIONER

## 2022-04-21 PROCEDURE — G0438 PPPS, INITIAL VISIT: HCPCS | Performed by: NURSE PRACTITIONER

## 2022-04-21 PROCEDURE — 91305 COVID-19,PF,PFIZER (12+ YRS): CPT | Performed by: NURSE PRACTITIONER

## 2022-04-21 PROCEDURE — 80061 LIPID PANEL: CPT | Performed by: NURSE PRACTITIONER

## 2022-04-21 PROCEDURE — 0054A COVID-19,PF,PFIZER (12+ YRS): CPT | Performed by: NURSE PRACTITIONER

## 2022-04-21 PROCEDURE — 99214 OFFICE O/P EST MOD 30 MIN: CPT | Mod: 25 | Performed by: NURSE PRACTITIONER

## 2022-04-21 PROCEDURE — 85027 COMPLETE CBC AUTOMATED: CPT | Performed by: NURSE PRACTITIONER

## 2022-04-21 RX ORDER — QUETIAPINE FUMARATE 25 MG/1
25 TABLET, FILM COATED ORAL 2 TIMES DAILY
Qty: 180 TABLET | Refills: 3 | Status: SHIPPED | OUTPATIENT
Start: 2022-04-21 | End: 2023-02-06

## 2022-04-21 RX ORDER — LISINOPRIL 20 MG/1
20 TABLET ORAL DAILY
Qty: 90 TABLET | Refills: 3 | Status: SHIPPED | OUTPATIENT
Start: 2022-04-21 | End: 2023-02-06

## 2022-04-21 RX ORDER — QUETIAPINE FUMARATE 100 MG/1
TABLET, FILM COATED ORAL
Qty: 90 TABLET | Refills: 3 | Status: SHIPPED | OUTPATIENT
Start: 2022-04-21 | End: 2023-02-06

## 2022-04-21 RX ORDER — ATORVASTATIN CALCIUM 40 MG/1
40 TABLET, FILM COATED ORAL DAILY
Qty: 90 TABLET | Refills: 3 | Status: SHIPPED | OUTPATIENT
Start: 2022-04-21 | End: 2023-02-06

## 2022-04-21 ASSESSMENT — ANXIETY QUESTIONNAIRES
6. BECOMING EASILY ANNOYED OR IRRITABLE: NOT AT ALL
4. TROUBLE RELAXING: SEVERAL DAYS
2. NOT BEING ABLE TO STOP OR CONTROL WORRYING: NOT AT ALL
1. FEELING NERVOUS, ANXIOUS, OR ON EDGE: SEVERAL DAYS
7. FEELING AFRAID AS IF SOMETHING AWFUL MIGHT HAPPEN: SEVERAL DAYS
GAD7 TOTAL SCORE: 3
GAD7 TOTAL SCORE: 3
5. BEING SO RESTLESS THAT IT IS HARD TO SIT STILL: NOT AT ALL
GAD7 TOTAL SCORE: 3
3. WORRYING TOO MUCH ABOUT DIFFERENT THINGS: NOT AT ALL
7. FEELING AFRAID AS IF SOMETHING AWFUL MIGHT HAPPEN: SEVERAL DAYS

## 2022-04-21 ASSESSMENT — ACTIVITIES OF DAILY LIVING (ADL): CURRENT_FUNCTION: NO ASSISTANCE NEEDED

## 2022-04-21 ASSESSMENT — PATIENT HEALTH QUESTIONNAIRE - PHQ9
10. IF YOU CHECKED OFF ANY PROBLEMS, HOW DIFFICULT HAVE THESE PROBLEMS MADE IT FOR YOU TO DO YOUR WORK, TAKE CARE OF THINGS AT HOME, OR GET ALONG WITH OTHER PEOPLE: NOT DIFFICULT AT ALL
SUM OF ALL RESPONSES TO PHQ QUESTIONS 1-9: 4
SUM OF ALL RESPONSES TO PHQ QUESTIONS 1-9: 4

## 2022-04-21 NOTE — PROGRESS NOTES
"SUBJECTIVE:   Joann Gonzalez is a 61 year old female who presents for Preventive Visit.    Split Bill scripting  The purpose of this visit is to discuss your medical history and prevent health problems before you are sick. You may be responsible for a co-pay, coinsurance, or deductible if your visit today includes services such as checking on a sore throat, having an x-ray or lab test, or treating and evaluating a new or existing condition    Patient has been advised of split billing requirements and indicates understanding: Yes     Are you in the first 12 months of your Medicare coverage?  No    Healthy Habits:    In general, how would you rate your overall health?  Very good    Frequency of exercise:  2-3 days/week    Duration of exercise:  Less than 15 minutes    Do you usually eat at least 4 servings of fruit and vegetables a day, include whole grains    & fiber and avoid regularly eating high fat or \"junk\" foods?  Yes    Taking medications regularly:  Yes    Barriers to taking medications:  None    Medication side effects:  Not applicable    Ability to successfully perform activities of daily living:  No assistance needed    Home Safety:  No safety concerns identified    Hearing Impairment:  No hearing concerns    In the past 6 months, have you been bothered by leaking of urine?  No    In general, how would you rate your overall mental or emotional health?  Good      PHQ-2 Total Score:    Additional concerns today:  Yes (abdominal pain, med refills)PHQ2 Score: Incomplete.    Health Maintenance Screening:    -Immunizations:   -Influenza: Declines   -Pneumococcal: NA   -Td/Tdap: Up to date   -Shingles: Due, discussed   -HPV: NA   -MMR: NA   -COVID Pfizer x2; 3rd dose today and recommend 4th dose in 4-5 months    -Colon Cancer Screening:  Up to date, DUE in Sept, 2022    -Lung Cancer Screening:  NA; quit smoking >10 years ago    -Breast Cancer Screening: Due    -Cervical Cancer Screening:  NA    -STD Screen:  Low " risk    -Cholesterol Screening:  Due    -Diabetes Screening:  Due    -Depression Screening:  Due    PHQ-2 Score:   PHQ-2 ( 1999 Pfizer) 4/21/2022 4/21/2022   Q1: Little interest or pleasure in doing things - -   Q2: Feeling down, depressed or hopeless - -   PHQ-2 Score - -   PHQ-2 Total Score (12-17 Years)- Positive if 3 or more points; Administer PHQ-A if positive - -   PHQ-2 Score Incomplete Incomplete     PATIENT HEALTH QUESTIONNAIRE-9 (PHQ - 9)    Over the last 2 weeks, how often have you been bothered by any of the following problems?    1. Little interest or pleasure in doing things -  (P) More than half the days   2. Feeling down, depressed, or hopeless -  (P) Not at all   3. Trouble falling or staying asleep, or sleeping too much - (P) Several days   4. Feeling tired or having little energy -  (P) Several days   5. Poor appetite or overeating -  (P) Not at all   6. Feeling bad about yourself - or that you are a failure or have let yourself or your family down -  (P) Not at all   7. Trouble concentrating on things, such as reading the newspaper or watching television - (P) Not at all   8. Moving or speaking so slowly that other people could have noticed? Or the opposite - being so fidgety or restless that you have been moving around a lot more than usual (P) Not at all   9. Thoughts that you would be better off dead or of hurting  yourself in some way (P) Not at all   Total Score: (P) 4     If you checked off any problems, how difficult have these problems made it for you to do your work, take care of things at home, or get along with other people?      Developed by Yancy Isabel, Yoli Toney, Khris Christopher and colleagues, with an educational flavio from [employer] No permission required to reproduce, translate, display or distribute. permission required to reproduce, translate, display or distribute.    Other Medical Issues:    Anxiety:  Depression:  Currently maintained on Seroquel 100 mg at  "HS and 25 mg BID.  States this has worked very well for mood stabilization and has been taking for years.  Also taking Fluoxetine.  She wishes to continue current regimen as this has been working very well for her; needs refills.    GI/Right abd symptoms:  Chronic bloating/gas after eating. Had gastric bypass years ago.  Feels \"swollen\" on right side of upper abdomen; no pain per se- symptoms present for past month.  Hx of ran/appy in the past.  Nothing makes symptoms worse/better.  Feels heartburn symptoms are well controlled. Takes 2 gas pills at night; OTC Rolaids seems to help some. No other associated symptoms.     HTN:  BP well controlled- Lisinopril 20 mg/day and amlodipine 10 mg/day.  Denies chest pain, headaches, dizziness, SOB.    Chronic Pain:  Low back; follows with [facility].  Has intrathecal pain pump; takes Oxycodone once daily for pain as well.  Pain well managed.  Denies side effects from pain meds.    Hyperlipidemia:  Hx of stroke:  Patient states she has not been taking her rosuvastatin as she reports it was too expensive to obtain.  Had a stroke in 2012.  Not taking ASA due to allergy but is taking Plavix daily and tolerating without issues.  No other residual side effects from stroke.      Do you feel safe in your environment? Yes    Have you ever done Advance Care Planning? (For example, a Health Directive, POLST, or a discussion with a medical provider or your loved ones about your wishes): No, advance care planning information given to patient to review.  Patient declined advance care planning discussion at this time.    Fall risk  Fallen 2 or more times in the past year?: No  Any fall with injury in the past year?: No    Cognitive Screening   1) Repeat 3 items (Leader, Season, Table)    2) Clock draw: NORMAL  3) 3 item recall: Recalls 2 objects  Results: 2 items recalled: COGNITIVE IMPAIRMENT LESS LIKELY    Mini-CogTM Copyright S Fer. Licensed by the author for use in [facility]; reprinted with permission (soob@[email or web address]). All rights reserved.      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     If you drink alcohol do you typically have >3 drinks per day or >7 drinks per week? No    Alcohol Use 7/31/2017   Prescreen: >3 drinks/day or >7 drinks/week? The patient does not drink >3 drinks per day nor >7 drinks per week.   No flowsheet data found.      Current providers sharing in care for this patient include:   Patient Care Team:  Chetna Kaufman RN CCM as Clinic Care Coordinator  Esau Ospina MD ([facility])  Haase, Susan Rachele, APRN CNP as Assigned PCP    The following health maintenance items are reviewed in Epic and correct as of today:  Health Maintenance Due   Topic Date Due     HIV SCREENING  Never done     HEPATITIS B IMMUNIZATION (1 of 3 - Risk 3-dose series) Never done     ZOSTER IMMUNIZATION (1 of 2) Never done     LUNG CANCER SCREENING  Never done     MAMMO SCREENING  03/01/2019     MEDICARE ANNUAL WELLNESS VISIT  08/30/2019     URINE DRUG SCREEN  08/30/2019     INFLUENZA VACCINE (1) 09/01/2021     COVID-19 Vaccine (3 - Booster for Pfizer series) 10/21/2021     Lab work is in process  Labs reviewed in EPIC  COVID 4th vaccine today  Shingles after 2 weeks from COVID    FHS-7: No flowsheet data found.    Mammogram Screening: Recommended mammography every 1-2 years with  "patient discussion and risk factor consideration  Pertinent mammograms are reviewed under the imaging tab.    Review of Systems  CONSTITUTIONAL: NEGATIVE for fever, chills, change in weight  INTEGUMENTARY/SKIN: NEGATIVE for worrisome rashes, moles or lesions  EYES: NEGATIVE for vision changes or irritation  ENT/MOUTH: NEGATIVE for ear, mouth and throat problems  RESP: NEGATIVE for significant cough or SOB  BREAST: NEGATIVE for masses, tenderness or discharge  CV: NEGATIVE for chest pain, palpitations or peripheral edema  GI: No N/V, no changes in bowel habits/no blood in stool. Having right upper abdominal bloating, \"swelling\".   : NEGATIVE for frequency, dysuria, or hematuria  MUSCULOSKELETAL:POSITIVE  for chronic pain- follows with [facility] has pain pump  NEURO: NEGATIVE for weakness, dizziness or paresthesias  ENDOCRINE: NEGATIVE for temperature intolerance, skin/hair changes  HEME: NEGATIVE for bleeding problems  PSYCHIATRIC: NEGATIVE for changes in mood or affect    OBJECTIVE:   /60   Pulse 95   Temp 98.6  F (37  C) (Tympanic)   Ht 1.511 m (4' 11.5\")   Wt 64.4 kg (142 lb)   LMP  (LMP Unknown)   SpO2 98%   Breastfeeding No   BMI 28.20 kg/m   Estimated body mass index is 28.2 kg/m  as calculated from the following:    Height as of this encounter: 1.511 m (4' 11.5\").    Weight as of this encounter: 64.4 kg (142 lb).     Physical Exam     GENERAL APPEARANCE: healthy, alert and no distress  EYES: Eyes grossly normal to inspection, PERRL and conjunctivae and sclerae normal  HENT: ear canals and TM's normal, nose and mouth without ulcers or lesions, oropharynx clear and oral mucous membranes moist  NECK: no adenopathy, no asymmetry, masses, or scars and thyroid normal to palpation  RESP: lungs clear to auscultation - no rales, rhonchi or wheezes  CV: regular rate and rhythm, normal S1 S2, no S3 or S4, no murmur, click or rub, no peripheral edema and peripheral pulses strong  ABDOMEN: soft, nontender, no " hepatosplenomegaly, no masses and bowel sounds normal  MS: no musculoskeletal defects are noted and gait is age appropriate without ataxia  SKIN: no suspicious lesions or rashes  NEURO: Normal strength and tone, sensory exam grossly normal, mentation intact and speech normal  PSYCH: mentation appears normal and affect normal/bright    Diagnostic Test Results:  Labs reviewed in Epic    ASSESSMENT / PLAN:   Joann was seen today for gastrointestinal problem.    Diagnoses and all orders for this visit:    Postsurgical malabsorption  -     Vitamin B12; Future  -     Vitamin B12  -     Hx of gastric bypass, check B12.  Historically this has been within normal limits    Medicare annual wellness visit, subsequent  -     Labs ordered  -     Mammogram ordered  -     Colon cancer/colonoscopy due in September  -     Pap no longer needed s/p JOY (non-cancer)  -     4th COVID vaccine today  -     Get Shingles vaccine after 2 weeks from COVID    Benign essential hypertension  -     REVIEW OF HEALTH MAINTENANCE PROTOCOL ORDERS  -     Comprehensive metabolic panel (BMP + Alb, Alk Phos, ALT, AST, Total. Bili, TP); Future  -     CBC with platelets; Future  -     lisinopril (ZESTRIL) 20 MG tablet; Take 1 tablet (20 mg) by mouth daily  -     Comprehensive metabolic panel (BMP + Alb, Alk Phos, ALT, AST, Total. Bili, TP)  -     CBC with platelets  -     BP at goal; continue current medications- refilled    Screening for HIV (human immunodeficiency virus)  -     HIV Antigen Antibody Combo; Future  -     HIV Antigen Antibody Combo    Encounter for screening mammogram for breast cancer  -     MA SCREENING DIGITAL BILAT - Future  (s+30); Future    Encounter for long-term (current) use of medications  -     EKG 12-lead complete w/read - Clinics  -     Obtained EKG due to long term use of Seroquel; QTc WNL  -     Recommend yearly EKG    History of gastric bypass    Hyperlipidemia LDL goal <100  -     Lipid Profile (Chol, Trig, HDL, LDL calc);  "Future  -     Glucose; Future  -     atorvastatin (LIPITOR) 40 MG tablet; Take 1 tablet (40 mg) by mouth daily  -     Lipid Profile (Chol, Trig, HDL, LDL calc)  -     Glucose  -     Has not been taking rosuvastatin due to the cost.  She did not notify her provider regarding this concern.  Discussed importance of taking a statin for secondary prevention for stroke, given history.  -     Prescribe atorvastatin 40 mg daily.  Patient was advised to let me know if there are any issues obtaining this medication.  Patient agrees to plan    Anxiety state  -     FLUoxetine (PROZAC) 20 MG capsule; TAKE 2 CAPSULES BY MOUTH EVERY DAY - LAST REFILL WITHOUT APPOINTMENT  -     Stable- refilled meds    History of CVA (cerebrovascular accident) 10-12 w residual Rtdominant  sided weakness    High priority for 2019 novel coronavirus vaccination  -     COVID-19,PF,PFIZER (12+ Yrs GRAY LABEL)    Major depression in complete remission (HCC) on meds   -     QUEtiapine (SEROQUEL) 100 MG tablet; TAKE 1 TABLET(100 MG) BY MOUTH AT BEDTIME  -     QUEtiapine (SEROQUEL) 25 MG tablet; Take 1 tablet (25 mg) by mouth 2 times daily  -     Stable; refilled meds.  EKG obtained today which looks ok.  Discussed concern re: long term use of Seroquel- need for yearly EKG.  She reports that she wishes to continue    RUQ abdominal pain  -     US Abdomen Complete; Future  -     Unclear etiology of pain- no TTP on exam- will get US    Chronic narcotic use / MAPPS since 9-14   - Follows with [facility]      Patient has been advised of split billing requirements and indicates understanding: Yes    COUNSELING:  Reviewed preventive health counseling, as reflected in patient instructions    Estimated body mass index is 28.2 kg/m  as calculated from the following:    Height as of this encounter: 1.511 m (4' 11.5\").    Weight as of this encounter: 64.4 kg (142 lb).        She reports that she quit smoking about 10 years ago. She started smoking about 53 years " ago. She has a 44.00 pack-year smoking history. She has never used smokeless tobacco.      Appropriate preventive services were discussed with this patient, including applicable screening as appropriate for cardiovascular disease, diabetes, osteopenia/osteoporosis, and glaucoma.  As appropriate for age/gender, discussed screening for colorectal cancer, prostate cancer, breast cancer, and cervical cancer. Checklist reviewing preventive services available has been given to the patient.    Reviewed patients plan of care and provided an AVS. The Basic Care Plan (routine screening as documented in Health Maintenance) for Joann meets the Care Plan requirement. This Care Plan has been established and reviewed with the Patient.    Counseling Resources:  ATP IV Guidelines  Pooled Cohorts Equation Calculator  Breast Cancer Risk Calculator  Breast Cancer: Medication to Reduce Risk  FRAX Risk Assessment  ICSI Preventive Guidelines  Dietary Guidelines for Americans, 2010  USDA's MyPlate  ASA Prophylaxis  Lung CA Screening    ROMEO Tian [facility]    Note was completed, in part, with Dragon voice recognition software.  Documentation was reviewed but some grammatical, spelling, and word errors may remain.      Identified Health Risks:

## 2022-04-21 NOTE — LETTER
April 28, 2022      Joann Gonzalez  [address]   [place] [address]        Dear ,    We are writing to inform you of your test results.    -Your kidney/liver labs look stable  -Your complete blood count is normal  -Cholesterol is slightly elevated, but continue your atorvastatin- should help  -B12 was normal  -Glucose mildly elevated, but not diabetic    Resulted Orders   HIV Antigen Antibody Combo   Result Value Ref Range    HIV Antigen Antibody Combo Nonreactive Nonreactive      Comment:      HIV-1 p24 Ag & HIV-1/HIV-2 Ab Not Detected   Comprehensive metabolic panel (BMP + Alb, Alk Phos, ALT, AST, Total. Bili, TP)   Result Value Ref Range    Sodium 138 133 - 144 mmol/L    Potassium 4.7 3.4 - 5.3 mmol/L    Chloride 105 94 - 109 mmol/L    Carbon Dioxide (CO2) 30 20 - 32 mmol/L    Anion Gap 3 3 - 14 mmol/L    Urea Nitrogen 6 (L) 7 - 30 mg/dL    Creatinine 0.72 0.52 - 1.04 mg/dL    Calcium 9.3 8.5 - 10.1 mg/dL    Glucose 108 (H) 70 - 99 mg/dL    Alkaline Phosphatase 129 40 - 150 U/L    AST 19 0 - 45 U/L    ALT 21 0 - 50 U/L    Protein Total 7.6 6.8 - 8.8 g/dL    Albumin 3.6 3.4 - 5.0 g/dL    Bilirubin Total 0.3 0.2 - 1.3 mg/dL    GFR Estimate >90 >60 mL/min/1.73m2      Comment:      Effective December 21, 2021 eGFRcr in adults is calculated using the 2021 CKD-EPI creatinine equation which includes age and gender (Lesley et al., NEJ, DOI: 10.1056/KFICqm7499968)   CBC with platelets   Result Value Ref Range    WBC Count 7.0 4.0 - 11.0 10e3/uL    RBC Count 4.99 3.80 - 5.20 10e6/uL    Hemoglobin 15.0 11.7 - 15.7 g/dL    Hematocrit 45.8 35.0 - 47.0 %    MCV 92 78 - 100 fL    MCH 30.1 26.5 - 33.0 pg    MCHC 32.8 31.5 - 36.5 g/dL    RDW 13.4 10.0 - 15.0 %    Platelet Count 282 150 - 450 10e3/uL   Lipid Profile (Chol, Trig, HDL, LDL calc)   Result Value Ref Range    Cholesterol 208 (H) <200 mg/dL    Triglycerides 133 <150 mg/dL    Direct Measure HDL 63 >=50 mg/dL    LDL Cholesterol Calculated 118 (H)  <=100 mg/dL    Non HDL Cholesterol 145 (H) <130 mg/dL    Patient Fasting > 8hrs? Yes     Narrative    Cholesterol  Desirable:  <200 mg/dL    Triglycerides  Normal:  Less than 150 mg/dL  Borderline High:  150-199 mg/dL  High:  200-499 mg/dL  Very High:  Greater than or equal to 500 mg/dL    Direct Measure HDL  Female:  Greater than or equal to 50 mg/dL   Male:  Greater than or equal to 40 mg/dL    LDL Cholesterol  Desirable:  <100mg/dL  Above Desirable:  100-129 mg/dL   Borderline High:  130-159 mg/dL   High:  160-189 mg/dL   Very High:  >= 190 mg/dL    Non HDL Cholesterol  Desirable:  130 mg/dL  Above Desirable:  130-159 mg/dL  Borderline High:  160-189 mg/dL  High:  190-219 mg/dL  Very High:  Greater than or equal to 220 mg/dL   Glucose   Result Value Ref Range    Glucose 108 (H) 70 - 99 mg/dL    Patient Fasting > 8hrs? Yes    Vitamin B12   Result Value Ref Range    Vitamin B12 688 193 - 986 pg/mL       If you have any questions or concerns, please call the clinic at the number listed above.       Sincerely,      ROMEO Tian CNP

## 2022-04-21 NOTE — PROGRESS NOTES
Answers for HPI/ROS submitted by the patient on 4/21/2022  If you checked off any problems, how difficult have these problems made it for you to do your work, take care of things at home, or get along with other people?: Not difficult at all  PHQ9 TOTAL SCORE: 4  PAU 7 TOTAL SCORE: 3  Do you check your blood pressure regularly outside of the clinic?: Yes  Are your blood pressures ever more than 140 on the top number (systolic) OR more than 90 on the bottom number (diastolic)? (For example, greater than 140/90): No  Are you following a low salt diet?: No  Depression/Anxiety: Depression & Anxiety  Status since last visit:: good  Anxiety since last: : good  Other associated symptoms of depression:: No  Other associated symotome: : No  Significant life event: : grief or loss  Anxious:: Yes  Current substance use:: No  How many servings of fruits and vegetables do you eat daily?: 2-3  On average, how many sweetened beverages do you drink each day (Examples: soda, juice, sweet tea, etc.  Do NOT count diet or artificially sweetened beverages)?: 0  How many minutes a day do you exercise enough to make your heart beat faster?: 10 to 19  How many days a week do you exercise enough to make your heart beat faster?: 4  How many days per week do you miss taking your medication?: 0

## 2022-04-21 NOTE — PATIENT INSTRUCTIONS
"-Get your shingles vaccine- at any pharmacy; wait 2 weeks after getting your covid vaccine  -3rd COVID vaccine today- get your 4th \"booster\" dose in 4-5 months  -Schedule mammogram  -You are due for a colonoscopy in September  -Schedule ultrasound of abdomen- someone will call you to schedule  "

## 2022-04-22 ASSESSMENT — ANXIETY QUESTIONNAIRES: GAD7 TOTAL SCORE: 3

## 2022-04-22 ASSESSMENT — PATIENT HEALTH QUESTIONNAIRE - PHQ9: SUM OF ALL RESPONSES TO PHQ QUESTIONS 1-9: 4

## 2022-04-27 ENCOUNTER — HOSPITAL ENCOUNTER (EMERGENCY)
Facility: CLINIC | Age: 62
Discharge: HOME | End: 2022-04-28
Attending: EMERGENCY MEDICINE
Payer: MEDICARE

## 2022-04-27 DIAGNOSIS — R10.13 EPIGASTRIC PAIN: ICD-10-CM

## 2022-04-27 LAB
BASOPHILS # BLD AUTO: 0.1 10E3/UL (ref 0–0.2)
BASOPHILS NFR BLD AUTO: 1 %
EOSINOPHIL # BLD AUTO: 0.2 10E3/UL (ref 0–0.7)
EOSINOPHIL NFR BLD AUTO: 2 %
ERYTHROCYTE [DISTWIDTH] IN BLOOD BY AUTOMATED COUNT: 13.1 % (ref 10–15)
HCT VFR BLD AUTO: 41.8 % (ref 35–47)
HGB BLD-MCNC: 14.2 G/DL (ref 11.7–15.7)
IMM GRANULOCYTES # BLD: 0 10E3/UL
IMM GRANULOCYTES NFR BLD: 0 %
LYMPHOCYTES # BLD AUTO: 2.2 10E3/UL (ref 0.8–5.3)
LYMPHOCYTES NFR BLD AUTO: 32 %
MCH RBC QN AUTO: 31 PG (ref 26.5–33)
MCHC RBC AUTO-ENTMCNC: 34 G/DL (ref 31.5–36.5)
MCV RBC AUTO: 91 FL (ref 78–100)
MONOCYTES # BLD AUTO: 0.5 10E3/UL (ref 0–1.3)
MONOCYTES NFR BLD AUTO: 7 %
NEUTROPHILS # BLD AUTO: 3.9 10E3/UL (ref 1.6–8.3)
NEUTROPHILS NFR BLD AUTO: 58 %
NRBC # BLD AUTO: 0 10E3/UL
NRBC BLD AUTO-RTO: 0 /100
PLATELET # BLD AUTO: 263 10E3/UL (ref 150–450)
RBC # BLD AUTO: 4.58 10E6/UL (ref 3.8–5.2)
WBC # BLD AUTO: 6.7 10E3/UL (ref 4–11)

## 2022-04-27 PROCEDURE — 36415 COLL VENOUS BLD VENIPUNCTURE: CPT | Performed by: EMERGENCY MEDICINE

## 2022-04-27 PROCEDURE — 258N000003 HC RX IP 258 OP 636: Performed by: EMERGENCY MEDICINE

## 2022-04-27 PROCEDURE — 96376 TX/PRO/DX INJ SAME DRUG ADON: CPT

## 2022-04-27 PROCEDURE — 99285 EMERGENCY DEPT VISIT HI MDM: CPT | Mod: 25

## 2022-04-27 PROCEDURE — 85025 COMPLETE CBC W/AUTO DIFF WBC: CPT | Performed by: EMERGENCY MEDICINE

## 2022-04-27 PROCEDURE — U0005 INFEC AGEN DETEC AMPLI PROBE: HCPCS | Performed by: EMERGENCY MEDICINE

## 2022-04-27 PROCEDURE — 96374 THER/PROPH/DIAG INJ IV PUSH: CPT | Mod: 59

## 2022-04-27 PROCEDURE — 250N000011 HC RX IP 250 OP 636: Performed by: EMERGENCY MEDICINE

## 2022-04-27 PROCEDURE — 96361 HYDRATE IV INFUSION ADD-ON: CPT

## 2022-04-27 PROCEDURE — 96375 TX/PRO/DX INJ NEW DRUG ADDON: CPT

## 2022-04-27 PROCEDURE — C9803 HOPD COVID-19 SPEC COLLECT: HCPCS

## 2022-04-27 PROCEDURE — 83605 ASSAY OF LACTIC ACID: CPT | Performed by: EMERGENCY MEDICINE

## 2022-04-27 RX ORDER — SODIUM CHLORIDE, SODIUM LACTATE, POTASSIUM CHLORIDE, CALCIUM CHLORIDE 600; 310; 30; 20 MG/100ML; MG/100ML; MG/100ML; MG/100ML
125 INJECTION, SOLUTION INTRAVENOUS CONTINUOUS
Status: DISCONTINUED | OUTPATIENT
Start: 2022-04-27 | End: 2022-04-28 | Stop reason: HOSPADM

## 2022-04-27 RX ORDER — HYDROMORPHONE HYDROCHLORIDE 1 MG/ML
0.5 INJECTION, SOLUTION INTRAMUSCULAR; INTRAVENOUS; SUBCUTANEOUS
Status: DISCONTINUED | OUTPATIENT
Start: 2022-04-27 | End: 2022-04-28 | Stop reason: HOSPADM

## 2022-04-27 RX ORDER — ONDANSETRON 2 MG/ML
4 INJECTION INTRAMUSCULAR; INTRAVENOUS EVERY 30 MIN PRN
Status: DISCONTINUED | OUTPATIENT
Start: 2022-04-27 | End: 2022-04-28 | Stop reason: HOSPADM

## 2022-04-27 RX ORDER — IOPAMIDOL 755 MG/ML
71 INJECTION, SOLUTION INTRAVASCULAR ONCE
Status: COMPLETED | OUTPATIENT
Start: 2022-04-28 | End: 2022-04-28

## 2022-04-27 RX ADMIN — HYDROMORPHONE HYDROCHLORIDE 0.5 MG: 1 INJECTION, SOLUTION INTRAMUSCULAR; INTRAVENOUS; SUBCUTANEOUS at 23:56

## 2022-04-27 RX ADMIN — SODIUM CHLORIDE, POTASSIUM CHLORIDE, SODIUM LACTATE AND CALCIUM CHLORIDE 1000 ML: 600; 310; 30; 20 INJECTION, SOLUTION INTRAVENOUS at 23:56

## 2022-04-27 RX ADMIN — ONDANSETRON 4 MG: 2 INJECTION INTRAMUSCULAR; INTRAVENOUS at 23:56

## 2022-04-27 ASSESSMENT — ENCOUNTER SYMPTOMS
ABDOMINAL PAIN: 1
NAUSEA: 0
SHORTNESS OF BREATH: 0
VOMITING: 0
CONSTIPATION: 0
COUGH: 0
DYSURIA: 0
DIARRHEA: 0
FEVER: 0

## 2022-04-28 ENCOUNTER — APPOINTMENT (OUTPATIENT)
Dept: CT IMAGING | Facility: CLINIC | Age: 62
End: 2022-04-28
Attending: EMERGENCY MEDICINE
Payer: MEDICARE

## 2022-04-28 VITALS
WEIGHT: 140 LBS | HEIGHT: 59 IN | RESPIRATION RATE: 18 BRPM | HEART RATE: 58 BPM | OXYGEN SATURATION: 95 % | TEMPERATURE: 97.7 F | SYSTOLIC BLOOD PRESSURE: 129 MMHG | BODY MASS INDEX: 28.22 KG/M2 | DIASTOLIC BLOOD PRESSURE: 57 MMHG

## 2022-04-28 LAB
ALBUMIN SERPL-MCNC: 3.1 G/DL (ref 3.4–5)
ALP SERPL-CCNC: 120 U/L (ref 40–150)
ALT SERPL W P-5'-P-CCNC: 33 U/L (ref 0–50)
ANION GAP SERPL CALCULATED.3IONS-SCNC: 3 MMOL/L (ref 3–14)
AST SERPL W P-5'-P-CCNC: 42 U/L (ref 0–45)
BILIRUB SERPL-MCNC: 0.2 MG/DL (ref 0.2–1.3)
BUN SERPL-MCNC: 10 MG/DL (ref 7–30)
CALCIUM SERPL-MCNC: 8.4 MG/DL (ref 8.5–10.1)
CHLORIDE BLD-SCNC: 106 MMOL/L (ref 94–109)
CO2 SERPL-SCNC: 29 MMOL/L (ref 20–32)
CREAT SERPL-MCNC: 0.71 MG/DL (ref 0.52–1.04)
GFR SERPL CREATININE-BSD FRML MDRD: >90 ML/MIN/1.73M2
GLUCOSE BLD-MCNC: 80 MG/DL (ref 70–99)
HOLD SPECIMEN: NORMAL
LACTATE SERPL-SCNC: 1.3 MMOL/L (ref 0.7–2)
LIPASE SERPL-CCNC: 135 U/L (ref 73–393)
POTASSIUM BLD-SCNC: 4.2 MMOL/L (ref 3.4–5.3)
PROT SERPL-MCNC: 6.7 G/DL (ref 6.8–8.8)
SARS-COV-2 RNA RESP QL NAA+PROBE: NEGATIVE
SODIUM SERPL-SCNC: 138 MMOL/L (ref 133–144)

## 2022-04-28 PROCEDURE — 36415 COLL VENOUS BLD VENIPUNCTURE: CPT | Performed by: EMERGENCY MEDICINE

## 2022-04-28 PROCEDURE — 250N000011 HC RX IP 250 OP 636: Performed by: EMERGENCY MEDICINE

## 2022-04-28 PROCEDURE — 250N000013 HC RX MED GY IP 250 OP 250 PS 637: Performed by: EMERGENCY MEDICINE

## 2022-04-28 PROCEDURE — 74177 CT ABD & PELVIS W/CONTRAST: CPT

## 2022-04-28 PROCEDURE — 250N000009 HC RX 250: Performed by: EMERGENCY MEDICINE

## 2022-04-28 PROCEDURE — 80053 COMPREHEN METABOLIC PANEL: CPT | Performed by: EMERGENCY MEDICINE

## 2022-04-28 PROCEDURE — 83690 ASSAY OF LIPASE: CPT | Performed by: EMERGENCY MEDICINE

## 2022-04-28 RX ADMIN — HYDROMORPHONE HYDROCHLORIDE 0.5 MG: 1 INJECTION, SOLUTION INTRAMUSCULAR; INTRAVENOUS; SUBCUTANEOUS at 00:31

## 2022-04-28 RX ADMIN — IOPAMIDOL 71 ML: 755 INJECTION, SOLUTION INTRAVENOUS at 00:06

## 2022-04-28 RX ADMIN — LIDOCAINE HYDROCHLORIDE 30 ML: 20 SOLUTION ORAL; TOPICAL at 01:51

## 2022-04-28 RX ADMIN — FAMOTIDINE 20 MG: 10 INJECTION, SOLUTION INTRAVENOUS at 01:52

## 2022-04-28 RX ADMIN — SODIUM CHLORIDE 60 ML: 900 INJECTION INTRAVENOUS at 00:07

## 2022-04-28 NOTE — ED PROVIDER NOTES
History     Chief Complaint:  Abdominal Pain       HPI   Joann Gonzalez is a 61 year old female who presents with abdominal pain.  Pain started about 1 hour prior to arrival and has worsened.  She reports a history of twisted bowel and emergent surgery 4 years ago and feels this is somewhat similar.  She denies nausea or vomiting, fevers, changes in bowel movements.  She did not take anything at home for the pain.    Allergies:  Advil Pm [Ibuprofen-Diphenhydramine Cit]  Aspirin  Nsaids  Plaquenil [Hydroxychloroquine]     Medications:    amLODIPine (NORVASC) 10 MG tablet  atorvastatin (LIPITOR) 40 MG tablet  clopidogrel (PLAVIX) 75 MG tablet  FLUoxetine (PROZAC) 20 MG capsule  lisinopril (ZESTRIL) 20 MG tablet  Multiple Vitamin (MULTI-VITAMIN) per tablet  oxyCODONE-acetaminophen (PERCOCET) 5-325 MG per tablet  pantoprazole (PROTONIX) 40 MG EC tablet  polyethylene glycol (MIRALAX/GLYCOLAX) Packet  QUEtiapine (SEROQUEL) 100 MG tablet  QUEtiapine (SEROQUEL) 25 MG tablet        Past Medical History:    Past Medical History:   Diagnosis Date     Depression      Hypertension      Migraine 12/2013     Overdose      Stroke (H)      Unspecified cerebral artery occlusion with cerebral infarction 10/20/2012       Patient Active Problem List    Diagnosis Date Noted     Hyperlipidemia LDL goal <100 04/13/2021     Priority: Medium     Major depression in complete remission (HCC) on meds  04/12/2021     Priority: Medium     IFG (impaired fasting glucose) 04/10/2021     Priority: Medium     History of gastric bypass 04/10/2021     Priority: Medium     Postsurgical malabsorption 04/10/2021     Priority: Medium     Coronary artery calcification seen on CAT scan 04/10/2021     Priority: Medium     see CT 9/18       Obstructed internal hernia 09/29/2018     Priority: Medium     Ventral hernia without obstruction or gangrene 09/21/2017     Priority: Medium     Balance problems 09/21/2017     Priority: Medium     Benign essential  hypertension 04/05/2016     Priority: Medium     History of CVA (cerebrovascular accident) 10-12 w residual Rtdominant  sided weakness 04/05/2016     Priority: Medium             DISCHARGE DIAGNOSES:   1.  Small right pontine stroke.   2.  Intracranial atherosclerosis at M2.   3.  Uncontrolled hypertension.   4.  Hyperglycemia with an A1c of 6.1%.         SECONDARY DIAGNOSES:   1.  Chronic low back pain, status post spine surgery and requiring chronic Percocet.   2.  Depression.   3.  History of alcohol dependency in the past, in remission for last 3 years.   4.  Gastric bypass surgery.   5.  Insomnia.      CONSULTS OBTAINED IN THE HOSPITAL:   1.  Neurology.   2.  Cardiology.      LABORATORY ON DISCHARGE:  At time of discharge with hemoglobin is 12.8, white count of 4, platelets is 259.  Lipid panel shows cholesterol 153, LDL is 82, HDL 52.      IMAGING STUDIES DONE IN THE HOSPITAL:   1.  SHARON showed no clear source of embolism.  There is a fixed atheromatous plaque at the aortic root, ascending aorta is arched but no mobile elements or ulcers identified, no PFO shot, no CHELSEY thrombus.   2.  Echocardiogram:  LV is normal in size.  EF is 60-65%, grade I left ventricular diastolic dysfunction is noted.  No regional wall motion abnormalities, no significant valvular heart disease.   3.  MRI of the brain with and without contrast shows small lesion in the right side of inder.  This lesion does demonstrate contrast enhancement this is seen on the isotropic, this could be due to a subacute infarct.  Also, additional T2 hyperintensities in the white matter of both hemispheres and the left side of inder, more than 10 hyperintensities identified, which are nonspecific.   4.  Bilateral ultrasound carotids no significant stenosis seen at either carotid bifurcation.   5.  CT angiogram of head neck with and without contrast showed mild to moderate intracranial stenosis especially left M2 segment, no intracranial branch occlusion,  aneurysm or dissection.  There is a mild stenosis at the origin of the right internal carotid artery.     6.  CT scan of the head with contrast shows normal CT perfusion scan.   7.  X-ray of the chest, one view was negative.   8.  CT scan of the head without contrast is normal.      ADMITTING HISTORY:  Ms. Madhavi Gonzalez is a 52-year-old female with a history of hypertension and smoking history who presented with right-sided numbness and weakness.      COURSE IN THE HOSPITAL:   1.  Right-sided numbness and weakness.  She did have an allergy to aspirin and therefore she was started on Plavix.  By the time she returned from CT scan in the emergency room all her symptoms had completely resolved so she did not require TPA.  She had imaging studies as done above and MRI which showed a small pontine stroke.   2.  Hypertension.  She has been noted to be hypertensive, is compliant to her medications; however, she did state that her blood pressure has not been well controlled even prior to admission and therefore, new medication of amlodipine was added to this.   3.  Tobacco dependency.  She did not require a patch while in the hospital and thinks that she can quit successfully upon discharge.   4.  Hyperglycemia.  She had some elevated sugars, the highest of which was 226 on admission.  An A1c was done and this was 6.1%.                Recurrent major depression in partial remission (H) 04/05/2016     Priority: Medium     Overweight (BMI 25.0-29.9) 04/05/2016     Priority: Medium     Epigastric pain 07/11/2015     Priority: Medium     Depression, major, severe recurrence (H) 01/04/2015     Priority: Medium     Depression with suicidal ideation 01/03/2015     Priority: Medium     Chronic narcotic use / MAPPS since 9-14  12/30/2014     Priority: Medium     Back pain since 2005  -unknown etiol 12/30/2014     Priority: Medium     Overdose 07/10/2014     Priority: Medium     GERD (gastroesophageal reflux disease) 04/07/2014      Priority: Medium     EGD neg in 7/15       Migraine 01/31/2014     Priority: Medium     Problem list name updated by automated process. Provider to review       Health Care Home 09/12/2013     Priority: Medium     State Tier Level:    Status:  Declined  Care Coordinator:  Chetna Kaufman RN [facility]  [phone]  See Letters for [facility] Care Plan         Encephalopathy acute 06/09/2013     Priority: Medium     Anxiety 05/14/2013     Priority: Medium     Transient cerebral ischemia 11/01/2012     Priority: Medium     Diagnosis updated by automated process. Provider to review and confirm.       Postlaminectomy syndrome, lumbar region 10/06/2012     Priority: Medium     Insomnia 10/06/2012     Priority: Medium     Problem list name updated by automated process. Provider to review       Chronic low back pain 05/11/2011     Priority: Medium     History of colonic polyps 07/31/2009     Priority: Medium     1 tiny adenoma in 9/17; recheck in 5 years          Past Surgical History:    Past Surgical History:   Procedure Laterality Date     ABDOMEN SURGERY  2008 - Gallbladder     APPENDECTOMY  1997    done with hysterectomy     BACK SURGERY  2010 x 3 - Fusion     BACK SURGERY       CHOLECYSTECTOMY       ESOPHAGOSCOPY, GASTROSCOPY, DUODENOSCOPY (EGD), COMBINED N/A 7/11/2015    Procedure: COMBINED ESOPHAGOSCOPY, GASTROSCOPY, DUODENOSCOPY (EGD);  Surgeon: Sree Scanlon MD;  Location:  GI     GASTRIC BYPASS  2003     GASTRIC BYPASS       GI SURGERY       HYSTERECTOMY  1997     HYSTERECTOMY, PAP NO LONGER INDICATED  1997    hysterectomy     LAPAROSCOPIC HERNIORRHAPHY INTERNAL  9/29/2018    Procedure: LAPAROSCOPIC HERNIORRHAPHY INTERNAL;;  Surgeon: Suki Harrison MD;  Location:  OR     LAPAROSCOPIC LYSIS ADHESIONS N/A 9/29/2018    Procedure: LAPAROSCOPIC LYSIS ADHESIONS;;  Surgeon: Suki Harrison MD;  Location:  OR     LAPAROSCOPY DIAGNOSTIC (GENERAL) N/A 9/29/2018    Procedure: LAPAROSCOPY DIAGNOSTIC (GENERAL);   "Diagnostic Laparoscopy, Laparoscopic Lysis of Adhesion, Laparoscopic Internal Hernia Repair;  Surgeon: Suki Harrison MD;  Location:  OR        Family History:    family history includes Alzheimer Disease in her mother; Cancer in her brother; Endocrine Disease in her father; Hypertension in her mother.    Social History:   reports that she quit smoking about 10 years ago. She started smoking about 53 years ago. She has a 44.00 pack-year smoking history. She has never used smokeless tobacco. She reports that she does not drink alcohol and does not use drugs.    PCP: No Ref-Primary, Physician     Review of Systems   Constitutional: Negative for fever.   Respiratory: Negative for cough and shortness of breath.    Cardiovascular: Negative for chest pain.   Gastrointestinal: Positive for abdominal pain. Negative for constipation, diarrhea, nausea and vomiting.   Genitourinary: Negative for dysuria.   All other systems reviewed and are negative.        Physical Exam     Patient Vitals for the past 24 hrs:   BP Temp Temp src Pulse Resp SpO2 Height Weight   04/27/22 2346 -- -- -- 67 -- -- -- --   04/27/22 2345 (!) 158/80 97.7  F (36.5  C) Oral -- 18 98 % 1.499 m (4' 11\") 63.5 kg (140 lb)        Physical Exam  General: Appears well-developed and well-nourished.   Head: No signs of trauma.   CV: Normal rate and regular rhythm.    Resp: Effort normal and breath sounds normal. No respiratory distress.   GI: Soft. There is epigastric tenderness.  No rebound or guarding.  Normal bowel sounds.  No CVA tenderness.  MSK: Normal range of motion.  Neuro: The patient is alert and oriented. Speech normal.  Skin: Skin is warm and dry. No rash noted.   Psych: normal mood and affect. behavior is normal.     Emergency Department Course       Imaging:  CT Abdomen Pelvis w Contrast   Final Result   IMPRESSION:    1.  No acute abnormality in the abdomen or pelvis. No evidence for small bowel obstruction.         All imaging results were " discussed with the patient who voiced understanding of the findings.    Laboratory:  Labs Ordered and Resulted from Time of ED Arrival to Time of ED Departure   COMPREHENSIVE METABOLIC PANEL - Abnormal       Result Value    Sodium 138      Potassium 4.2      Chloride 106      Carbon Dioxide (CO2) 29      Anion Gap 3      Urea Nitrogen 10      Creatinine 0.71      Calcium 8.4 (*)     Glucose 80      Alkaline Phosphatase 120      AST 42      ALT 33      Protein Total 6.7 (*)     Albumin 3.1 (*)     Bilirubin Total 0.2      GFR Estimate >90     LIPASE - Normal    Lipase 135     COVID-19 VIRUS (CORONAVIRUS) BY PCR - Normal    SARS CoV2 PCR Negative     LACTIC ACID WHOLE BLOOD - Normal    Lactic Acid 1.3     CBC WITH PLATELETS AND DIFFERENTIAL    WBC Count 6.7      RBC Count 4.58      Hemoglobin 14.2      Hematocrit 41.8      MCV 91      MCH 31.0      MCHC 34.0      RDW 13.1      Platelet Count 263      % Neutrophils 58      % Lymphocytes 32      % Monocytes 7      % Eosinophils 2      % Basophils 1      % Immature Granulocytes 0      NRBCs per 100 WBC 0      Absolute Neutrophils 3.9      Absolute Lymphocytes 2.2      Absolute Monocytes 0.5      Absolute Eosinophils 0.2      Absolute Basophils 0.1      Absolute Immature Granulocytes 0.0      Absolute NRBCs 0.0            Interventions:    Medications   lactated ringers BOLUS 1,000 mL (0 mLs Intravenous Stopped 4/28/22 0148)   iopamidol (ISOVUE-370) solution 71 mL (71 mLs Intravenous Given 4/28/22 0006)   Saline flush (60 mLs Intravenous Given 4/28/22 0007)   famotidine (PEPCID) injection 20 mg (20 mg Intravenous Given 4/28/22 0152)   lidocaine (viscous) (XYLOCAINE) 2 % 15 mL, alum & mag hydroxide-simethicone (MAALOX) 15 mL GI Cocktail (30 mLs Oral Given 4/28/22 0151)        Emergency Department Course:  Past medical records, nursing notes, and vitals reviewed.  I performed an exam of the patient and obtained history, as documented above.    I rechecked the patient.  Findings and plan explained to the Patient. Patient was discharged.    Impression & Plan      Medical Decision Making:  Patient presents due to abdominal pain.  Pain began approximately 1 hour prior to arrival.  She was concerned because she has a history of twisted bowel requiring emergent surgery.  Blood work and CT scan were obtained and these were reassuring.  There is no signs of volvulus, diverticulitis, obstruction, or other acute intra-abdominal process.  Patient was given a GI cocktail and Pepcid and had noticeable improvement with this.  It is certainly possible her symptoms may be related to gastritis.  She is already on Protonix and I recommended that she could try Pepcid and Maalox over-the-counter and also recommended diet modification.  Given the reassuring work-up and improvement of her symptoms I felt that she was appropriate for discharge home recommend that she follow the primary care doctor and return for any worsening symptoms or further concerns.      Diagnosis:    ICD-10-CM    1. Epigastric pain  R10.13         Discharge Medications:  Discharge Medication List as of 4/28/2022  2:34 AM           4/27/2022   Weston Quezada MD [place]stal, John A, MD  04/28/22 0718

## 2022-04-28 NOTE — ED NOTES
Bed: ED20  Expected date: 4/27/22  Expected time: 11:35 PM  Means of arrival: Ambulance  Comments:  HEMS 422 61F acute Abd. Pain; Hx of bowel issues

## 2022-04-28 NOTE — ED TRIAGE NOTES
"Acute onset of mid abdominal pain \"stabbing\" at 2230. History of a twisted bowel which she had surgery for. Given 75mcg of fentanyl by ems.       "

## 2022-04-28 NOTE — DISCHARGE INSTRUCTIONS
Your pain may be from irritation to the stomach lining.  You can try taking pepcid and maalox along with your medications for continued control of your symptoms.

## 2022-06-29 ENCOUNTER — HOSPITAL ENCOUNTER (EMERGENCY)
Facility: CLINIC | Age: 62
Discharge: HOME | End: 2022-06-29
Attending: EMERGENCY MEDICINE | Admitting: EMERGENCY MEDICINE
Payer: MEDICARE

## 2022-06-29 ENCOUNTER — APPOINTMENT (OUTPATIENT)
Dept: CT IMAGING | Facility: CLINIC | Age: 62
End: 2022-06-29
Attending: EMERGENCY MEDICINE
Payer: MEDICARE

## 2022-06-29 VITALS
SYSTOLIC BLOOD PRESSURE: 152 MMHG | OXYGEN SATURATION: 97 % | TEMPERATURE: 97.2 F | DIASTOLIC BLOOD PRESSURE: 71 MMHG | RESPIRATION RATE: 16 BRPM | HEART RATE: 72 BPM

## 2022-06-29 DIAGNOSIS — R10.84 ABDOMINAL PAIN, GENERALIZED: ICD-10-CM

## 2022-06-29 DIAGNOSIS — Z98.84 HISTORY OF GASTRIC BYPASS: ICD-10-CM

## 2022-06-29 LAB
ALBUMIN SERPL-MCNC: 3.8 G/DL (ref 3.4–5)
ALP SERPL-CCNC: 170 U/L (ref 40–150)
ALT SERPL W P-5'-P-CCNC: 33 U/L (ref 0–50)
ANION GAP SERPL CALCULATED.3IONS-SCNC: 3 MMOL/L (ref 3–14)
AST SERPL W P-5'-P-CCNC: 27 U/L (ref 0–45)
ATRIAL RATE - MUSE: 52 BPM
BASOPHILS # BLD AUTO: 0.1 10E3/UL (ref 0–0.2)
BASOPHILS NFR BLD AUTO: 1 %
BILIRUB SERPL-MCNC: 0.2 MG/DL (ref 0.2–1.3)
BUN SERPL-MCNC: 8 MG/DL (ref 7–30)
CALCIUM SERPL-MCNC: 9.3 MG/DL (ref 8.5–10.1)
CHLORIDE BLD-SCNC: 103 MMOL/L (ref 94–109)
CO2 SERPL-SCNC: 27 MMOL/L (ref 20–32)
CREAT SERPL-MCNC: 0.73 MG/DL (ref 0.52–1.04)
DIASTOLIC BLOOD PRESSURE - MUSE: NORMAL MMHG
EOSINOPHIL # BLD AUTO: 0.1 10E3/UL (ref 0–0.7)
EOSINOPHIL NFR BLD AUTO: 1 %
ERYTHROCYTE [DISTWIDTH] IN BLOOD BY AUTOMATED COUNT: 12.8 % (ref 10–15)
GFR SERPL CREATININE-BSD FRML MDRD: >90 ML/MIN/1.73M2
GLUCOSE BLD-MCNC: 184 MG/DL (ref 70–99)
HCT VFR BLD AUTO: 44.6 % (ref 35–47)
HGB BLD-MCNC: 14.7 G/DL (ref 11.7–15.7)
IMM GRANULOCYTES # BLD: 0 10E3/UL
IMM GRANULOCYTES NFR BLD: 0 %
INTERPRETATION ECG - MUSE: NORMAL
LIPASE SERPL-CCNC: 92 U/L (ref 73–393)
LYMPHOCYTES # BLD AUTO: 1.3 10E3/UL (ref 0.8–5.3)
LYMPHOCYTES NFR BLD AUTO: 20 %
MCH RBC QN AUTO: 30.4 PG (ref 26.5–33)
MCHC RBC AUTO-ENTMCNC: 33 G/DL (ref 31.5–36.5)
MCV RBC AUTO: 92 FL (ref 78–100)
MONOCYTES # BLD AUTO: 0.3 10E3/UL (ref 0–1.3)
MONOCYTES NFR BLD AUTO: 5 %
NEUTROPHILS # BLD AUTO: 5 10E3/UL (ref 1.6–8.3)
NEUTROPHILS NFR BLD AUTO: 73 %
NRBC # BLD AUTO: 0 10E3/UL
NRBC BLD AUTO-RTO: 0 /100
P AXIS - MUSE: 41 DEGREES
PLATELET # BLD AUTO: 231 10E3/UL (ref 150–450)
POTASSIUM BLD-SCNC: 3.2 MMOL/L (ref 3.4–5.3)
PR INTERVAL - MUSE: 164 MS
PROT SERPL-MCNC: 7.7 G/DL (ref 6.8–8.8)
QRS DURATION - MUSE: 74 MS
QT - MUSE: 462 MS
QTC - MUSE: 429 MS
R AXIS - MUSE: 2 DEGREES
RBC # BLD AUTO: 4.83 10E6/UL (ref 3.8–5.2)
SODIUM SERPL-SCNC: 133 MMOL/L (ref 133–144)
SYSTOLIC BLOOD PRESSURE - MUSE: NORMAL MMHG
T AXIS - MUSE: -6 DEGREES
TROPONIN I SERPL HS-MCNC: 4 NG/L
VENTRICULAR RATE- MUSE: 52 BPM
WBC # BLD AUTO: 6.7 10E3/UL (ref 4–11)

## 2022-06-29 PROCEDURE — G1010 CDSM STANSON: HCPCS

## 2022-06-29 PROCEDURE — 93005 ELECTROCARDIOGRAM TRACING: CPT

## 2022-06-29 PROCEDURE — 85014 HEMATOCRIT: CPT | Performed by: EMERGENCY MEDICINE

## 2022-06-29 PROCEDURE — 99285 EMERGENCY DEPT VISIT HI MDM: CPT | Mod: 25

## 2022-06-29 PROCEDURE — 84484 ASSAY OF TROPONIN QUANT: CPT | Performed by: EMERGENCY MEDICINE

## 2022-06-29 PROCEDURE — 250N000009 HC RX 250: Performed by: EMERGENCY MEDICINE

## 2022-06-29 PROCEDURE — 250N000011 HC RX IP 250 OP 636: Performed by: EMERGENCY MEDICINE

## 2022-06-29 PROCEDURE — 80053 COMPREHEN METABOLIC PANEL: CPT | Performed by: EMERGENCY MEDICINE

## 2022-06-29 PROCEDURE — 96374 THER/PROPH/DIAG INJ IV PUSH: CPT | Mod: 59

## 2022-06-29 PROCEDURE — 96361 HYDRATE IV INFUSION ADD-ON: CPT

## 2022-06-29 PROCEDURE — 36415 COLL VENOUS BLD VENIPUNCTURE: CPT | Performed by: EMERGENCY MEDICINE

## 2022-06-29 PROCEDURE — 85025 COMPLETE CBC W/AUTO DIFF WBC: CPT | Performed by: EMERGENCY MEDICINE

## 2022-06-29 PROCEDURE — 83690 ASSAY OF LIPASE: CPT | Performed by: EMERGENCY MEDICINE

## 2022-06-29 PROCEDURE — 250N000013 HC RX MED GY IP 250 OP 250 PS 637: Performed by: EMERGENCY MEDICINE

## 2022-06-29 PROCEDURE — 258N000003 HC RX IP 258 OP 636: Performed by: EMERGENCY MEDICINE

## 2022-06-29 PROCEDURE — 74177 CT ABD & PELVIS W/CONTRAST: CPT | Mod: MG

## 2022-06-29 RX ORDER — HYDROMORPHONE HYDROCHLORIDE 1 MG/ML
0.5 INJECTION, SOLUTION INTRAMUSCULAR; INTRAVENOUS; SUBCUTANEOUS
Status: DISCONTINUED | OUTPATIENT
Start: 2022-06-29 | End: 2022-06-30 | Stop reason: HOSPADM

## 2022-06-29 RX ORDER — IOPAMIDOL 755 MG/ML
20 INJECTION, SOLUTION INTRAVASCULAR ONCE
Status: COMPLETED | OUTPATIENT
Start: 2022-06-29 | End: 2022-06-29

## 2022-06-29 RX ORDER — ONDANSETRON 2 MG/ML
4 INJECTION INTRAMUSCULAR; INTRAVENOUS
Status: DISCONTINUED | OUTPATIENT
Start: 2022-06-29 | End: 2022-06-30 | Stop reason: HOSPADM

## 2022-06-29 RX ORDER — SUCRALFATE 1 G/10ML
1 SUSPENSION ORAL 4 TIMES DAILY
Qty: 600 ML | Refills: 0 | Status: SHIPPED | OUTPATIENT
Start: 2022-06-29 | End: 2022-07-14

## 2022-06-29 RX ADMIN — SODIUM CHLORIDE 60 ML: 900 INJECTION INTRAVENOUS at 22:03

## 2022-06-29 RX ADMIN — LIDOCAINE HYDROCHLORIDE 30 ML: 20 SOLUTION ORAL; TOPICAL at 21:00

## 2022-06-29 RX ADMIN — SODIUM CHLORIDE 1000 ML: 9 INJECTION, SOLUTION INTRAVENOUS at 21:00

## 2022-06-29 RX ADMIN — IOPAMIDOL 65 ML: 755 INJECTION, SOLUTION INTRAVENOUS at 22:01

## 2022-06-29 RX ADMIN — ONDANSETRON 4 MG: 2 INJECTION INTRAMUSCULAR; INTRAVENOUS at 21:00

## 2022-06-30 NOTE — ED PROVIDER NOTES
History   Chief Complaint:  Abdominal Pain       The history is provided by the patient.   HPI supplemented by EHR.     Joann Gonzalez is a 61 year old female on Plavix with history of remote Johnny-en-Y gastric bypass, GERD, hypertension, hyperlipidemia, CVA, among others who presents with primarily upper abdominal pain that began roughly 4 hours ago while the patient was shopping. The patient notes that the pain begins in the upper part of her abdomen and radiates to her umbilicus. She notes taking Gas-X for her symptoms which provided incomplete relief. She mentions being seen in April for similar symptoms. The patient denies urinary symptoms, cough, or shortness of breath. She notes her last bowel movement was this morning. The patient reports eating a breakfast that is normal to her. The patient mentions taking pain medication for her back chronically, which is unchanged and is on a PPI. Patient denies alcohol use. Joann also notes no history of endoscopy or a regular GI doctor. The patient has a history of appendectomy, hysterectomy, oophorectomy, and gastric bypass surgeries.    Review of Systems   All other systems reviewed and are negative.    Allergies:  Advil PM  Aspirin  Nsaids  Plaquenil    Medications:  Amlodipine  Lipitor  Plavix  Prozac  Zestril  Percocet  Protonix  Seroquel    Past Medical History:     Chronic low back pain  Postlaminectomy syndrome, lumbar region  Insomnia  Anxiety  Encephalopathy  Migraine  GERD  Overdose  Chronic narcotic use  Back pain since 2005  Depression with suicidal ideation  Hypertension   History of CVA  Overweight  History of colonic polyps  Ventral hernia without obstruction or gangrene  Balance problems  Obstructed internal hernia  IFG  Postsurgical malabsorption  Coronary artery calcification  Hyperlipidemia     Past Surgical History:    Appendectomy  Hysterectomy  Back surgery 3x  Cholecystectomy  EGD  Gastric bypass  Laparoscopic herniorrhaphy  Laparoscopic  clysis adhesions  Laparoscopy diagnostic     Family History:    Mother: Alzheimer's disease, hypertension  Father: endocrine disease  Brother: lung cancer    Social History:  The patient presents to the ED with her .  The patient presents to the ED via ambulance.  The patient notes not working.    Physical Exam     Patient Vitals for the past 24 hrs:   BP Temp Temp src Pulse Resp SpO2   06/29/22 2018 (!) 152/71 97.2  F (36.2  C) Oral 72 16 97 %     Physical Exam  General: Woman sitting upright in room 20,  at bedside  HENT: mucous membranes moist   CV: rate as above, no LE edema  Resp: normal effort, speaks in full phrases, no stridor, no cough observed  GI: Abdomen soft, mild to moderate tenderness in the periumbilical and epigastric region, no guarding, negative Zacarias sign, no distention, no tympany  MSK: no bony tenderness, no CVAT  Skin: appropriately warm and dry, no abdominal rash  Neuro: alert, clear speech, oriented   Psych: cooperative    Emergency Department Course   ECG  ECG taken at 2155, ECG read at 2158  Sinus bradycardia   Inferior infarct, old  Anterior infarct, old  Rate 52 bpm. MD interval 164 ms. QRS duration 74 ms. QT/QTc 462/429 ms. P-R-T axes 41 2 -6.     Imaging:  CT Abdomen Pelvis w Contrast   Final Result   IMPRESSION:    1.  No acute abnormality. No bowel obstruction or inflammation.        Report per radiology    Laboratory:  Labs Ordered and Resulted from Time of ED Arrival to Time of ED Departure   COMPREHENSIVE METABOLIC PANEL - Abnormal       Result Value    Sodium 133      Potassium 3.2 (*)     Chloride 103      Carbon Dioxide (CO2) 27      Anion Gap 3      Urea Nitrogen 8      Creatinine 0.73      Calcium 9.3      Glucose 184 (*)     Alkaline Phosphatase 170 (*)     AST 27      ALT 33      Protein Total 7.7      Albumin 3.8      Bilirubin Total 0.2      GFR Estimate >90     LIPASE - Normal    Lipase 92     TROPONIN I - Normal    Troponin I High Sensitivity 4     CBC  WITH PLATELETS AND DIFFERENTIAL    WBC Count 6.7      RBC Count 4.83      Hemoglobin 14.7      Hematocrit 44.6      MCV 92      MCH 30.4      MCHC 33.0      RDW 12.8      Platelet Count 231      % Neutrophils 73      % Lymphocytes 20      % Monocytes 5      % Eosinophils 1      % Basophils 1      % Immature Granulocytes 0      NRBCs per 100 WBC 0      Absolute Neutrophils 5.0      Absolute Lymphocytes 1.3      Absolute Monocytes 0.3      Absolute Eosinophils 0.1      Absolute Basophils 0.1      Absolute Immature Granulocytes 0.0      Absolute NRBCs 0.0       Emergency Department Course:       Reviewed:  I reviewed nursing notes, vitals and past medical history    Assessments:  2045 I obtained history and examined the patient as noted above.   2242 I rechecked the patient and explained findings.    Interventions:  2100 Xylocaine 30 mL PO  2100 NS 1000 mL IV  2100 Zofran 4 mg IV    Disposition:  The patient was discharged to home.     Impression & Plan     Medical Decision Making:  Given her complex history including multiple prior abdominal surgeries, I considered a variety potentially dangerous conditions including bowel obstruction, perforated viscus, peptic ulcer disease, referred pain from lower thoracic conditions such as acute coronary syndrome or pulmonary embolism, ureteral stone, and many others.  Fortunately, her work-up here is benign.  She reported prompt and very significant relief soon after the GI cocktail, which certainly increases consideration of gastritis or peptic ulcer disease though this cannot be confirmed, and the rationale for close follow-up for consideration of further evaluation, possibly including upper endoscopy, was discussed with her.  She is relieved by these test results, acknowledging the diagnostic uncertainty, and is very comfortable with the plan for discharge home.  She should return here for sudden worsening at any hour.    Diagnosis:    ICD-10-CM    1. Abdominal pain,  generalized  R10.84    2. History of gastric bypass  Z98.84        Discharge Medications:  Discharge Medication List as of 6/29/2022 10:53 PM      START taking these medications    Details   sucralfate (CARAFATE) 1 GM/10ML suspension Take 10 mLs (1 g) by mouth 4 times daily for 15 days, Disp-600 mL, R-0, Local Print             Scribe Disclosure:  I, Cricket Donahue, am serving as a scribe at 8:44 PM on 6/29/2022 to document services personally performed by Alexander Green MD based on my observations and the provider's statements to me.        Alexander Green MD  06/30/22 6414

## 2022-06-30 NOTE — ED TRIAGE NOTES
Four hours ago developed abdominal pain. Hx of twisted bowel.      Triage Assessment     Row Name 06/29/22 2005       Triage Assessment (Adult)    Airway WDL WDL       Respiratory WDL    Respiratory WDL WDL       Skin Circulation/Temperature WDL    Skin Circulation/Temperature WDL WDL       Cardiac WDL    Cardiac WDL WDL       Peripheral/Neurovascular WDL    Peripheral Neurovascular WDL WDL       Cognitive/Neuro/Behavioral WDL    Cognitive/Neuro/Behavioral WDL WDL

## 2022-06-30 NOTE — ED NOTES
Bed: ED20  Expected date:   Expected time:   Means of arrival:   Comments:  HEMS 423 62F abd pain, hx of twisted bowel

## 2022-09-13 DIAGNOSIS — R10.13 ABDOMINAL PAIN, EPIGASTRIC: ICD-10-CM

## 2022-09-13 DIAGNOSIS — K21.9 GASTROESOPHAGEAL REFLUX DISEASE WITHOUT ESOPHAGITIS: ICD-10-CM

## 2022-09-13 NOTE — LETTER
[facility]  [address] [address]  Phone: [phone]       September 15, 2022         Joann Gonzalez  [address] [address]        Dear Joann:    We recently provided you with medication refills for Protonix. Many medications require routine follow-up with your doctor. Your previous providers have since retired or taken a position elsewhere. You will need to re-establish with a new provider at the clinic.     Your prescription(s) have been refilled for 90 days so you may have time for the above noted follow-up. Please call to schedule soon so we can assure you have an appointment before your next refills are needed.    Thank you,      [facility]

## 2022-09-15 RX ORDER — PANTOPRAZOLE SODIUM 40 MG/1
TABLET, DELAYED RELEASE ORAL
Qty: 90 TABLET | Refills: 1 | Status: SHIPPED | OUTPATIENT
Start: 2022-09-15 | End: 2023-02-06

## 2022-09-15 NOTE — TELEPHONE ENCOUNTER
Prescription approved per [facility] Refill Protocol.  Mailed letter will need to re-establish with new provider.

## 2022-10-19 ENCOUNTER — OFFICE VISIT (OUTPATIENT)
Dept: FAMILY MEDICINE | Facility: CLINIC | Age: 62
End: 2022-10-19
Payer: MEDICARE

## 2022-10-19 VITALS
WEIGHT: 135.1 LBS | BODY MASS INDEX: 27.24 KG/M2 | HEART RATE: 87 BPM | DIASTOLIC BLOOD PRESSURE: 71 MMHG | TEMPERATURE: 98.7 F | HEIGHT: 59 IN | SYSTOLIC BLOOD PRESSURE: 104 MMHG | OXYGEN SATURATION: 95 %

## 2022-10-19 DIAGNOSIS — I10 BENIGN ESSENTIAL HYPERTENSION: ICD-10-CM

## 2022-10-19 DIAGNOSIS — Z86.73 HISTORY OF CVA (CEREBROVASCULAR ACCIDENT): ICD-10-CM

## 2022-10-19 DIAGNOSIS — Z23 HIGH PRIORITY FOR 2019-NCOV VACCINE: ICD-10-CM

## 2022-10-19 DIAGNOSIS — Z98.84 HISTORY OF GASTRIC BYPASS: ICD-10-CM

## 2022-10-19 DIAGNOSIS — F33.9 RECURRENT MAJOR DEPRESSIVE DISORDER, REMISSION STATUS UNSPECIFIED (H): ICD-10-CM

## 2022-10-19 DIAGNOSIS — Z01.818 PREOP GENERAL PHYSICAL EXAM: Primary | ICD-10-CM

## 2022-10-19 DIAGNOSIS — H26.9 CATARACT, UNSPECIFIED CATARACT TYPE, UNSPECIFIED LATERALITY: ICD-10-CM

## 2022-10-19 DIAGNOSIS — F11.20 CHRONIC NARCOTIC DEPENDENCE (H): Chronic | ICD-10-CM

## 2022-10-19 PROCEDURE — 0124A COVID-19,PF,PFIZER BOOSTER BIVALENT: CPT | Performed by: PHYSICIAN ASSISTANT

## 2022-10-19 PROCEDURE — 90682 RIV4 VACC RECOMBINANT DNA IM: CPT | Performed by: PHYSICIAN ASSISTANT

## 2022-10-19 PROCEDURE — 91312 COVID-19,PF,PFIZER BOOSTER BIVALENT: CPT | Performed by: PHYSICIAN ASSISTANT

## 2022-10-19 PROCEDURE — G0008 ADMIN INFLUENZA VIRUS VAC: HCPCS | Mod: 59 | Performed by: PHYSICIAN ASSISTANT

## 2022-10-19 PROCEDURE — 99214 OFFICE O/P EST MOD 30 MIN: CPT | Mod: 25 | Performed by: PHYSICIAN ASSISTANT

## 2022-10-19 RX ORDER — FLUOXETINE 40 MG/1
CAPSULE ORAL
Qty: 90 CAPSULE | Refills: 1 | Status: SHIPPED | OUTPATIENT
Start: 2022-10-19 | End: 2023-01-18

## 2022-10-19 ASSESSMENT — PAIN SCALES - GENERAL: PAINLEVEL: NO PAIN (0)

## 2022-10-19 NOTE — PATIENT INSTRUCTIONS
Stop all NSAIDs (motrin, ibuprofen, naproxen, aleve, advil, naprosyn, aspirin)  for at least 5 days prior to surgery.    Tylenol is safe prior to surgery.    Stop your clopidogrel (plavix) 5 days prior to procedure.    Take quetiapine (seroquel), pantoprazole (protonix), lisinopril, fluoxetine, amlodipine and atorvastatin with a sip of water the am of surgery.    Take your evening medication as usual the night before surgery.

## 2022-10-19 NOTE — PROGRESS NOTES
[address], [address]  [facility], [place] [address]  Phone: [phone]  Primary Provider: No Ref-Primary, Physician  Pre-op Performing Provider: ANTONINA SARMIENTO      PREOPERATIVE EVALUATION:  Today's date: 10/19/2022    Joann Gonzalez is a 62 year old female who presents for a preoperative evaluation.    Surgical Information:  Surgery/Procedure: cataract sugery (Left Eye)   Surgery Location: [facility]   Surgeon: Dr Bhatt   Surgery Date: 10/26/2022  Time of Surgery: MAC  Where patient plans to recover: At home with family  Fax number for surgical facility: [phone]    Type of Anesthesia Anticipated: Local with MAC    Assessment & Plan     The proposed surgical procedure is considered LOW risk.    (Z01.818) Preop general physical exam  (primary encounter diagnosis)  Comment: Chronic issue stable, able to do 4 METS  Plan: Cleared for minor procedure    (H26.9) Cataract, unspecified cataract type, unspecified laterality  Comment: Left eye, following with Dr. Bhatt  Plan: Surgery as planned    (F33.9) Recurrent major depressive disorder, remission status unspecified (H)  Comment: Stable on fluoxetine, requesting refill  Plan: FLUoxetine (PROZAC) 40 MG capsule    (Z86.73) History of CVA (cerebrovascular accident) 10-12 w residual Rtdominant  sided weakness  Comment: 2012, on Plavix has some residual balance issues  Plan: We will have her hold Plavix 5 days prior to procedure    (I10) Benign essential hypertension  Comment: Well-controlled on amlodipine, lisinopril  Plan: Continue above    (Z98.84) History of gastric bypass  Comment:   Plan:     (Z23) High priority for 2019-nCoV vaccine  Comment:  Plan: COVID-19,PF,PFIZER BOOSTER BIVALENT 12+Yrs    (F11.20) Chronic narcotic use / MAPPS since 9-14   Comment:   Plan:     Risks and Recommendations:  The patient has the following additional risks and recommendations for perioperative complications:   - Consult Hospitalist /  IM to assist with post-op medical management    Medication Instructions:  Stop all NSAIDs (motrin, ibuprofen, naproxen, aleve, advil, naprosyn, aspirin)  for at least 5 days prior to surgery.    Tylenol is safe prior to surgery.    Stop your clopidogrel (plavix) 5 days prior to procedure.    Take quetiapine (seroquel), pantoprazole (protonix), lisinopril, fluoxetine, amlodipine and atorvastatin with a sip of water the am of surgery.    Take your evening medication as usual the night before surgery.    RECOMMENDATION:  APPROVAL GIVEN to proceed with proposed procedure, without further diagnostic evaluation.    Nneka Mason PA-C  30 minutes on the day of the encounter doing chart review, history and exam, documentation and further activities as noted above.      Subjective     HPI related to upcoming procedure:  Joann is here fore a pre-op for cataract surgery.  Her pcp retired so she needs a new one.     She takes plavix after having a stroke 2012.      She overall feels very well.  Her depression is well-controlled on prozac.    She does her own housekeeping and can easily walk up a flight of stairs w/out chest pain or significant dyspnea.      Preop Questions 10/19/2022   1. Have you ever had a heart attack or stroke? YES -    2. Have you ever had surgery on your heart or blood vessels, such as a stent placement, a coronary artery bypass, or surgery on an artery in your head, neck, heart, or legs? No   3. Do you have chest pain with activity? No   4. Do you have a history of  heart failure? No   5. Do you currently have a cold, bronchitis or symptoms of other infection? No   6. Do you have a cough, shortness of breath, or wheezing? No   7. Do you or anyone in your family have previous history of blood clots? No   8. Do you or does anyone in your family have a serious bleeding problem such as prolonged bleeding following surgeries or cuts? No   9. Have you ever had problems with anemia or been told to  Patient is a 59y old  Female who presents with a chief complaint of lower abdominal pain for 4 days (16 Sep 2018 06:56)    Awake, alert, comfortable in bed in NAD. Had EGD because of abdominal pain. Denies cough or sob at rest . Clinically stable while on antibiotics. Pt has no new complaints.    INTERVAL HPI/OVERNIGHT EVENTS:      VITAL SIGNS:  T(F): 98.4 (09-16-18 @ 04:46)  HR: 67 (09-16-18 @ 04:46)  BP: 139/70 (09-16-18 @ 04:46)  RR: 16 (09-16-18 @ 04:46)  SpO2: 98% (09-16-18 @ 04:46)  Wt(kg): --  I&O's Detail          REVIEW OF SYSTEMS:    CONSTITUTIONAL:  No fevers, chills, sweats    HEENT:  Eyes:  No diplopia or blurred vision. ENT:  No earache, sore throat or runny nose.    CARDIOVASCULAR:  No pressure, squeezing, tightness, or heaviness about the chest; no palpitations.    RESPIRATORY:  Per HPI    GASTROINTESTINAL:  No abdominal pain, nausea, vomiting or diarrhea.    GENITOURINARY:  No dysuria, frequency or urgency.    NEUROLOGIC:  No paresthesias, fasciculations, seizures or weakness.    PSYCHIATRIC:  No disorder of thought or mood.      PHYSICAL EXAM:    Constitutional: Well developed and nourished  Eyes:Perrla  ENMT: normal  Neck:supple  Respiratory: + Wheezing posteriorly  Cardiovascular: S1 S2 regular  Gastrointestinal: Soft, + mildly tender   Extremities: No edema  Vascular:normal  Neurological:Awake, alert,Ox3  Musculoskeletal:Normal      MEDICATIONS  (STANDING):  ALBUTerol    90 MICROgram(s) HFA Inhaler 1 Puff(s) Inhalation every 6 hours  buDESOnide  80 MICROgram(s)/formoterol 4.5 MICROgram(s) Inhaler 2 Puff(s) Inhalation two times a day  cefTRIAXone   IVPB 1 Gram(s) IV Intermittent every 24 hours  dextrose 5% + sodium chloride 0.9%. 1000 milliLiter(s) (70 mL/Hr) IV Continuous <Continuous>  enoxaparin Injectable 40 milliGRAM(s) SubCutaneous daily  ergocalciferol 62348 Unit(s) Oral <User Schedule>  ferrous    sulfate 325 milliGRAM(s) Oral daily  melatonin 3 milliGRAM(s) Oral at bedtime  metroNIDAZOLE  IVPB 500 milliGRAM(s) IV Intermittent every 8 hours  pantoprazole    Tablet 40 milliGRAM(s) Oral before breakfast  sodium chloride 0.9% with potassium chloride 20 mEq/L 1000 milliLiter(s) (70 mL/Hr) IV Continuous <Continuous>  tamsulosin 0.4 milliGRAM(s) Oral at bedtime    MEDICATIONS  (PRN):  acetaminophen   Tablet .. 650 milliGRAM(s) Oral every 6 hours PRN Temp greater or equal to 38C (100.4F)  zolpidem 5 milliGRAM(s) Oral at bedtime PRN Insomnia      Allergies    Motrin (Swelling)    Intolerances        LABS:                        8.8    11.7  )-----------( 496      ( 16 Sep 2018 07:30 )             27.7     09-16    139  |  106  |  6<L>  ----------------------------<  102<H>  2.8<LL>   |  26  |  0.68    Ca    7.7<L>      16 Sep 2018 07:30  Phos  2.5     09-16  Mg     1.7     09-16                CAPILLARY BLOOD GLUCOSE            RADIOLOGY & ADDITIONAL TESTS:    CXR:  < from: Xray Chest 1 View AP/PA (09.09.18 @ 20:00) >    INTERPRETATION:  History: Upper abdominal pain    Technique:  AP portable    Comparisons:  none    Findings:     Subsegmental atelectasis and volume loss inleft lower   lobe.  . Right lung is clear. No pleural effusions.    The pulmonary   vasculature and aorta are normal for age. Heart size is unremarkable.     The thorax is normal for age.    Impression: Subsegmental atelectasis left lower lobe. Mild relative   elevation of left diaphragm      < end of copied text >    Ct scan chest:  < from: CT Abdomen and Pelvis w/ Oral Cont and w/ IV Cont (09.09.18 @ 22:18) >  FINDINGS:    LUNG BASES:  There are no pleural effusions. Cystic bronchiectasis at the   left lung base    < end of copied text >  < from: MR MRCP w/wo IV Cont (09.12.18 @ 19:33) >  Impression: The common bile duct measures up to 6 mm in caliber which is   within normal limits. No suspicious filling defect in the common bile   duct to suggest choledocholithiasis. The main pancreatic duct is not   dilated.     In the upper left kidney, there is a small area of striated   hypoenhancement, suggestive of pyelonephritis. Small nonenhancing foci   within this area of striated hypoenhancement in the upper left kidney   measuring up to 8 mm, suggestive of associated microabscesses. Follow-up   abdominal MR in 4-6 weeks may be pursued to ensure resolution or   improvement, and to rule out malignant neoplastic process.    < end of copied text >    ekg;    echo:  < from: US Renal (09.11.18 @ 11:59) >  IMPRESSION:  No hydronephrosis.       < end of copied text > take iron pills? No   10. Have you had any abnormal blood loss such as black, tarry or bloody stools, or abnormal vaginal bleeding? No   11. Have you ever had a blood transfusion? No   12. Are you willing to have a blood transfusion if it is medically needed before, during, or after your surgery? Yes   13. Have you or any of your relatives ever had problems with anesthesia? No   14. Do you have sleep apnea, excessive snoring or daytime drowsiness? No   15. Do you have any artifical heart valves or other implanted medical devices like a pacemaker, defibrillator, or continuous glucose monitor? No   16. Do you have artificial joints? No   17. Are you allergic to latex? No        Health Care Directive:  Patient does not have a Health Care Directive or Living Will: Discussed advance care planning with patient; information given to patient to review.      Review of Systems  CONSTITUTIONAL: NEGATIVE for fever, chills, change in weight  INTEGUMENTARY/SKIN: NEGATIVE for worrisome rashes, moles or lesions  EYES: NEGATIVE for eye irritation  ENT/MOUTH: NEGATIVE for ear, mouth and throat problems  RESP: NEGATIVE for significant cough or SOB  CV: NEGATIVE for chest pain, palpitations or peripheral edema  GI: NEGATIVE for nausea, abdominal pain, heartburn, or change in bowel habits  : NEGATIVE for frequency, dysuria, or hematuria  MUSCULOSKELETAL: NEGATIVE for significant arthralgias or myalgia  NEURO: NEGATIVE for weakness, dizziness or paresthesias  ENDOCRINE: NEGATIVE for temperature intolerance, skin/hair changes  HEME: NEGATIVE for bleeding problems    Patient Active Problem List    Diagnosis Date Noted     Hyperlipidemia LDL goal <100 04/13/2021     Priority: Medium     Major depression in complete remission (HCC) on meds  04/12/2021     Priority: Medium     IFG (impaired fasting glucose) 04/10/2021     Priority: Medium     History of gastric bypass 04/10/2021     Priority: Medium     Postsurgical malabsorption 04/10/2021      Priority: Medium     Coronary artery calcification seen on CAT scan 04/10/2021     Priority: Medium     see CT 9/18       Obstructed internal hernia 09/29/2018     Priority: Medium     Ventral hernia without obstruction or gangrene 09/21/2017     Priority: Medium     Balance problems 09/21/2017     Priority: Medium     Benign essential hypertension 04/05/2016     Priority: Medium     History of CVA (cerebrovascular accident) 10-12 w residual Rtdominant  sided weakness 04/05/2016     Priority: Medium             DISCHARGE DIAGNOSES:   1.  Small right pontine stroke.   2.  Intracranial atherosclerosis at M2.   3.  Uncontrolled hypertension.   4.  Hyperglycemia with an A1c of 6.1%.         SECONDARY DIAGNOSES:   1.  Chronic low back pain, status post spine surgery and requiring chronic Percocet.   2.  Depression.   3.  History of alcohol dependency in the past, in remission for last 3 years.   4.  Gastric bypass surgery.   5.  Insomnia.      CONSULTS OBTAINED IN THE HOSPITAL:   1.  Neurology.   2.  Cardiology.      LABORATORY ON DISCHARGE:  At time of discharge with hemoglobin is 12.8, white count of 4, platelets is 259.  Lipid panel shows cholesterol 153, LDL is 82, HDL 52.      IMAGING STUDIES DONE IN THE HOSPITAL:   1.  SHARON showed no clear source of embolism.  There is a fixed atheromatous plaque at the aortic root, ascending aorta is arched but no mobile elements or ulcers identified, no PFO shot, no CHELSEY thrombus.   2.  Echocardiogram:  LV is normal in size.  EF is 60-65%, grade I left ventricular diastolic dysfunction is noted.  No regional wall motion abnormalities, no significant valvular heart disease.   3.  MRI of the brain with and without contrast shows small lesion in the right side of idner.  This lesion does demonstrate contrast enhancement this is seen on the isotropic, this could be due to a subacute infarct.  Also, additional T2 hyperintensities in the white matter of both hemispheres and the left  side of inder, more than 10 hyperintensities identified, which are nonspecific.   4.  Bilateral ultrasound carotids no significant stenosis seen at either carotid bifurcation.   5.  CT angiogram of head neck with and without contrast showed mild to moderate intracranial stenosis especially left M2 segment, no intracranial branch occlusion, aneurysm or dissection.  There is a mild stenosis at the origin of the right internal carotid artery.     6.  CT scan of the head with contrast shows normal CT perfusion scan.   7.  X-ray of the chest, one view was negative.   8.  CT scan of the head without contrast is normal.      ADMITTING HISTORY:  Ms. Madhavi Gonzalez is a 52-year-old female with a history of hypertension and smoking history who presented with right-sided numbness and weakness.      COURSE IN THE HOSPITAL:   1.  Right-sided numbness and weakness.  She did have an allergy to aspirin and therefore she was started on Plavix.  By the time she returned from CT scan in the emergency room all her symptoms had completely resolved so she did not require TPA.  She had imaging studies as done above and MRI which showed a small pontine stroke.   2.  Hypertension.  She has been noted to be hypertensive, is compliant to her medications; however, she did state that her blood pressure has not been well controlled even prior to admission and therefore, new medication of amlodipine was added to this.   3.  Tobacco dependency.  She did not require a patch while in the hospital and thinks that she can quit successfully upon discharge.   4.  Hyperglycemia.  She had some elevated sugars, the highest of which was 226 on admission.  An A1c was done and this was 6.1%.                Recurrent major depression in partial remission (H) 04/05/2016     Priority: Medium     Overweight (BMI 25.0-29.9) 04/05/2016     Priority: Medium     Epigastric pain 07/11/2015     Priority: Medium     Depression, major, severe recurrence (H) 01/04/2015      Priority: Medium     Depression with suicidal ideation 01/03/2015     Priority: Medium     Chronic narcotic use / MAPPS since 9-14  12/30/2014     Priority: Medium     Back pain since 2005  -unknown etiol 12/30/2014     Priority: Medium     Overdose 07/10/2014     Priority: Medium     GERD (gastroesophageal reflux disease) 04/07/2014     Priority: Medium     EGD neg in 7/15       Migraine 01/31/2014     Priority: Medium     Problem list name updated by automated process. Provider to review       Health Care Home 09/12/2013     Priority: Medium     State Tier Level:    Status:  Declined  Care Coordinator:  Chetna Kaufman RN [facility]  [phone]  See Letters for [facility] Care Plan         Encephalopathy acute 06/09/2013     Priority: Medium     Anxiety 05/14/2013     Priority: Medium     Transient cerebral ischemia 11/01/2012     Priority: Medium     Diagnosis updated by automated process. Provider to review and confirm.       Postlaminectomy syndrome, lumbar region 10/06/2012     Priority: Medium     Insomnia 10/06/2012     Priority: Medium     Problem list name updated by automated process. Provider to review       Chronic low back pain 05/11/2011     Priority: Medium     History of colonic polyps 07/31/2009     Priority: Medium     1 tiny adenoma in 9/17; recheck in 5 years        Past Medical History:   Diagnosis Date     Depression      Hypertension      Migraine 12/2013     Overdose      Stroke (H)      Unspecified cerebral artery occlusion with cerebral infarction 10/20/2012     Past Surgical History:   Procedure Laterality Date     ABDOMEN SURGERY  2008 - Gallbladder     APPENDECTOMY  1997    done with hysterectomy     BACK SURGERY  2010 x 3 - Fusion     BACK SURGERY       CHOLECYSTECTOMY       ESOPHAGOSCOPY, GASTROSCOPY, DUODENOSCOPY (EGD), COMBINED N/A 7/11/2015    Procedure: COMBINED ESOPHAGOSCOPY, GASTROSCOPY, DUODENOSCOPY (EGD);  Surgeon: Sree Scanlon MD;  Location:  GI     GASTRIC BYPASS  2003      GASTRIC BYPASS       GI SURGERY       HYSTERECTOMY  1997     HYSTERECTOMY, PAP NO LONGER INDICATED  1997    hysterectomy     LAPAROSCOPIC HERNIORRHAPHY INTERNAL  9/29/2018    Procedure: LAPAROSCOPIC HERNIORRHAPHY INTERNAL;;  Surgeon: Suki Harrison MD;  Location: [facility] OR     LAPAROSCOPIC LYSIS ADHESIONS N/A 9/29/2018    Procedure: LAPAROSCOPIC LYSIS ADHESIONS;;  Surgeon: Suki Harrison MD;  Location: [facility] OR     LAPAROSCOPY DIAGNOSTIC (GENERAL) N/A 9/29/2018    Procedure: LAPAROSCOPY DIAGNOSTIC (GENERAL);  Diagnostic Laparoscopy, Laparoscopic Lysis of Adhesion, Laparoscopic Internal Hernia Repair;  Surgeon: Suki Harrison MD;  Location: [facility] OR     Current Outpatient Medications   Medication Sig Dispense Refill     amLODIPine (NORVASC) 10 MG tablet TAKE 1 TABLET(10 MG) BY MOUTH DAILY 90 tablet 2     atorvastatin (LIPITOR) 40 MG tablet Take 1 tablet (40 mg) by mouth daily 90 tablet 3     clopidogrel (PLAVIX) 75 MG tablet TAKE 1 TABLET(75 MG) BY MOUTH DAILY 90 tablet 3     FLUoxetine (PROZAC) 20 MG capsule TAKE 2 CAPSULES BY MOUTH EVERY DAY - LAST REFILL WITHOUT APPOINTMENT 180 capsule 1     lisinopril (ZESTRIL) 20 MG tablet Take 1 tablet (20 mg) by mouth daily 90 tablet 3     Multiple Vitamin (MULTI-VITAMIN) per tablet Take 1 tablet by mouth daily.       oxyCODONE-acetaminophen (PERCOCET) 5-325 MG per tablet Take 1 tablet by mouth daily as needed for severe pain       pantoprazole (PROTONIX) 40 MG EC tablet TAKE 1 TABLET(40 MG) BY MOUTH DAILY 15 MINUTES BEFORE BREAKFAST 90 tablet 1     polyethylene glycol (MIRALAX/GLYCOLAX) Packet Take 17 g by mouth 2 times daily 21 packet 1     QUEtiapine (SEROQUEL) 100 MG tablet TAKE 1 TABLET(100 MG) BY MOUTH AT BEDTIME 90 tablet 3     QUEtiapine (SEROQUEL) 25 MG tablet Take 1 tablet (25 mg) by mouth 2 times daily 180 tablet 3       Allergies   Allergen Reactions     Advil Pm [Ibuprofen-Diphenhydramine Cit] Other (See Comments)     Or any ibuprophen  Reports red face     Aspirin  "Unknown     Nsaids      Plaquenil [Hydroxychloroquine] Rash        Social History     Tobacco Use     Smoking status: Former     Packs/day: 1.00     Years: 44.00     Pack years: 44.00     Types: Cigarettes     Start date: 8/12/1968     Quit date: 4/9/2012     Years since quitting: 10.5     Smokeless tobacco: Never   Substance Use Topics     Alcohol use: No     Comment: 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     Family History   Problem Relation Age of Onset     Alzheimer Disease Mother      Hypertension Mother      Endocrine Disease Father      Cancer Brother         Lung     History   Drug Use No         Objective     /71 (BP Location: Right arm, Patient Position: Sitting, Cuff Size: Adult Regular)   Pulse 87   Temp 98.7  F (37.1  C) (Oral)   Ht 1.499 m (4' 11\")   Wt 61.3 kg (135 lb 1.6 oz)   LMP  (LMP Unknown)   SpO2 95%   BMI 27.29 kg/m      Physical Exam       GENERAL APPEARANCE: in NAD, very pleasant      EYES: no scleral icterus     HENT: OP clear mouth without ulcers or lesions     NECK: supple, no bruits     RESP: lungs clear to auscultation - no rales, rhonchi or wheezes     CV: regular rates and rhythm, normal S1 S2, no S3 or S4 and no murmur, click or rub     ABDOMEN:  soft, nontender, no HSM or masses and bowel sounds normal     MS: extremities normal- no gross deformities noted, no edema     NEURO: Normal mentation, gait and speech normal     PSYCH: mentation appears normal. and affect normal/bright      Recent Labs   Lab Test 06/29/22  2020 04/28/22  0031 04/27/22  2348   HGB 14.7  --  14.2     --  263    138  --    POTASSIUM 3.2* 4.2  --    CR 0.73 0.71  --         Diagnostics:  No labs were ordered during this visit.   No EKG required for low risk surgery (cataract, skin procedure, breast biopsy, etc).    Revised Cardiac Risk Index (RCRI):  The patient has the following serious cardiovascular risks for perioperative complications:   - Cerebrovascular Disease (TIA " or CVA) = 1 point     RCRI Interpretation: 1 point: Class II (low risk - 0.9% complication rate)           Signed Electronically by: Nneka Mason PA-C  Copy of this evaluation report is provided to requesting physician.

## 2022-12-09 DIAGNOSIS — I10 BENIGN ESSENTIAL HYPERTENSION: ICD-10-CM

## 2022-12-09 RX ORDER — AMLODIPINE BESYLATE 10 MG/1
10 TABLET ORAL DAILY
Qty: 90 TABLET | Refills: 2 | OUTPATIENT
Start: 2022-12-09

## 2022-12-13 ENCOUNTER — TELEPHONE (OUTPATIENT)
Dept: INTERNAL MEDICINE | Facility: CLINIC | Age: 62
End: 2022-12-13

## 2022-12-13 DIAGNOSIS — I10 BENIGN ESSENTIAL HYPERTENSION: ICD-10-CM

## 2022-12-13 NOTE — TELEPHONE ENCOUNTER
Reason for Call:  Medication or medication refill:    Do you use a [facility]?  Name of the pharmacy and phone number for the current request:  [facility] [address] in [place]    Name of the medication requested: amlodipine desylate 10 mg    Other request: Patient has appointment set up for a follow up for medication  On 02/06/2023. Patient only has 2 pills left.    Can we leave a detailed message on this number? YES    Phone number patient can be reached at: Cell number on file:    Telephone Information:   Mobile [phone]       Best Time: anytime    Call taken on 12/13/2022 at 4:17 PM by GHADA ANSARI

## 2022-12-15 RX ORDER — AMLODIPINE BESYLATE 10 MG/1
10 TABLET ORAL DAILY
Qty: 90 TABLET | Refills: 0 | Status: SHIPPED | OUTPATIENT
Start: 2022-12-15 | End: 2023-02-06

## 2022-12-15 NOTE — TELEPHONE ENCOUNTER
Upon chart review, patient previously seen by Dr. Sal and Gabriela Riley. Patient is scheduled to establish care with Dr. Fletcher in February.     Appointments in Next Year    Feb 06, 2023 11:00 AM  (Arrive by 10:40 AM)  Provider Visit with Gianluca Fletcher MD  [facility] ([facility] ) [phone]        Will route refill request to Dr. Fletcher for approval. Medication pended for 90 days.    Leeann Niak RN

## 2023-02-06 ENCOUNTER — OFFICE VISIT (OUTPATIENT)
Dept: INTERNAL MEDICINE | Facility: CLINIC | Age: 63
End: 2023-02-06
Payer: MEDICARE

## 2023-02-06 VITALS
BODY MASS INDEX: 27.02 KG/M2 | OXYGEN SATURATION: 98 % | SYSTOLIC BLOOD PRESSURE: 111 MMHG | WEIGHT: 133.8 LBS | TEMPERATURE: 98.4 F | HEART RATE: 80 BPM | DIASTOLIC BLOOD PRESSURE: 52 MMHG

## 2023-02-06 DIAGNOSIS — F41.1 ANXIETY STATE: ICD-10-CM

## 2023-02-06 DIAGNOSIS — I10 BENIGN ESSENTIAL HYPERTENSION: Primary | ICD-10-CM

## 2023-02-06 DIAGNOSIS — Z23 NEED FOR VACCINATION: ICD-10-CM

## 2023-02-06 DIAGNOSIS — Z12.11 SCREEN FOR COLON CANCER: ICD-10-CM

## 2023-02-06 DIAGNOSIS — E78.5 HYPERLIPIDEMIA LDL GOAL <100: ICD-10-CM

## 2023-02-06 DIAGNOSIS — Z86.73 HISTORY OF CVA (CEREBROVASCULAR ACCIDENT): ICD-10-CM

## 2023-02-06 DIAGNOSIS — F33.9 RECURRENT MAJOR DEPRESSIVE DISORDER, REMISSION STATUS UNSPECIFIED (H): ICD-10-CM

## 2023-02-06 DIAGNOSIS — K21.9 GASTROESOPHAGEAL REFLUX DISEASE WITHOUT ESOPHAGITIS: ICD-10-CM

## 2023-02-06 PROBLEM — I69.351 HEMIPLEGIA AND HEMIPARESIS FOLLOWING CEREBRAL INFARCTION AFFECTING RIGHT DOMINANT SIDE (H): Status: ACTIVE | Noted: 2023-02-06

## 2023-02-06 PROBLEM — I69.351 HEMIPLEGIA AND HEMIPARESIS FOLLOWING CEREBRAL INFARCTION AFFECTING RIGHT DOMINANT SIDE (H): Status: RESOLVED | Noted: 2023-02-06 | Resolved: 2023-02-06

## 2023-02-06 LAB
ANION GAP SERPL CALCULATED.3IONS-SCNC: 10 MMOL/L (ref 7–15)
BUN SERPL-MCNC: 10.7 MG/DL (ref 8–23)
CALCIUM SERPL-MCNC: 9.3 MG/DL (ref 8.8–10.2)
CHLORIDE SERPL-SCNC: 105 MMOL/L (ref 98–107)
CHOLEST SERPL-MCNC: 170 MG/DL
CREAT SERPL-MCNC: 0.79 MG/DL (ref 0.51–0.95)
DEPRECATED HCO3 PLAS-SCNC: 28 MMOL/L (ref 22–29)
GFR SERPL CREATININE-BSD FRML MDRD: 83 ML/MIN/1.73M2
GLUCOSE SERPL-MCNC: 98 MG/DL (ref 70–99)
HDLC SERPL-MCNC: 65 MG/DL
LDLC SERPL CALC-MCNC: 83 MG/DL
NONHDLC SERPL-MCNC: 105 MG/DL
POTASSIUM SERPL-SCNC: 4.7 MMOL/L (ref 3.4–5.3)
SODIUM SERPL-SCNC: 143 MMOL/L (ref 136–145)
TRIGL SERPL-MCNC: 109 MG/DL

## 2023-02-06 PROCEDURE — G0009 ADMIN PNEUMOCOCCAL VACCINE: HCPCS | Performed by: INTERNAL MEDICINE

## 2023-02-06 PROCEDURE — 90732 PPSV23 VACC 2 YRS+ SUBQ/IM: CPT | Performed by: INTERNAL MEDICINE

## 2023-02-06 PROCEDURE — 80061 LIPID PANEL: CPT | Performed by: INTERNAL MEDICINE

## 2023-02-06 PROCEDURE — 80048 BASIC METABOLIC PNL TOTAL CA: CPT | Performed by: INTERNAL MEDICINE

## 2023-02-06 PROCEDURE — 99214 OFFICE O/P EST MOD 30 MIN: CPT | Mod: 25 | Performed by: INTERNAL MEDICINE

## 2023-02-06 PROCEDURE — 36415 COLL VENOUS BLD VENIPUNCTURE: CPT | Performed by: INTERNAL MEDICINE

## 2023-02-06 PROCEDURE — 96127 BRIEF EMOTIONAL/BEHAV ASSMT: CPT | Performed by: INTERNAL MEDICINE

## 2023-02-06 RX ORDER — CLOPIDOGREL BISULFATE 75 MG/1
TABLET ORAL
Qty: 90 TABLET | Refills: 3 | Status: SHIPPED | OUTPATIENT
Start: 2023-02-06 | End: 2023-08-17

## 2023-02-06 RX ORDER — QUETIAPINE FUMARATE 25 MG/1
25 TABLET, FILM COATED ORAL 2 TIMES DAILY
Qty: 180 TABLET | Refills: 3 | Status: SHIPPED | OUTPATIENT
Start: 2023-02-06 | End: 2023-04-05

## 2023-02-06 RX ORDER — ATORVASTATIN CALCIUM 40 MG/1
40 TABLET, FILM COATED ORAL DAILY
Qty: 90 TABLET | Refills: 3 | Status: SHIPPED | OUTPATIENT
Start: 2023-02-06 | End: 2024-03-25

## 2023-02-06 RX ORDER — QUETIAPINE FUMARATE 100 MG/1
TABLET, FILM COATED ORAL
Qty: 90 TABLET | Refills: 3 | Status: SHIPPED | OUTPATIENT
Start: 2023-02-06 | End: 2023-04-05

## 2023-02-06 RX ORDER — AMLODIPINE BESYLATE 10 MG/1
10 TABLET ORAL DAILY
Qty: 90 TABLET | Refills: 3 | Status: SHIPPED | OUTPATIENT
Start: 2023-02-06 | End: 2024-02-29

## 2023-02-06 RX ORDER — LISINOPRIL 20 MG/1
20 TABLET ORAL DAILY
Qty: 90 TABLET | Refills: 3 | Status: SHIPPED | OUTPATIENT
Start: 2023-02-06 | End: 2024-03-25

## 2023-02-06 RX ORDER — FLUOXETINE 40 MG/1
40 CAPSULE ORAL DAILY
Qty: 90 CAPSULE | Refills: 3 | Status: SHIPPED | OUTPATIENT
Start: 2023-02-06 | End: 2024-04-12

## 2023-02-06 RX ORDER — PANTOPRAZOLE SODIUM 40 MG/1
TABLET, DELAYED RELEASE ORAL
Qty: 90 TABLET | Refills: 1 | Status: SHIPPED | OUTPATIENT
Start: 2023-02-06 | End: 2024-05-23

## 2023-02-06 ASSESSMENT — ANXIETY QUESTIONNAIRES
GAD7 TOTAL SCORE: 1
4. TROUBLE RELAXING: NOT AT ALL
7. FEELING AFRAID AS IF SOMETHING AWFUL MIGHT HAPPEN: NOT AT ALL
5. BEING SO RESTLESS THAT IT IS HARD TO SIT STILL: NOT AT ALL
GAD7 TOTAL SCORE: 1
2. NOT BEING ABLE TO STOP OR CONTROL WORRYING: NOT AT ALL
6. BECOMING EASILY ANNOYED OR IRRITABLE: SEVERAL DAYS
8. IF YOU CHECKED OFF ANY PROBLEMS, HOW DIFFICULT HAVE THESE MADE IT FOR YOU TO DO YOUR WORK, TAKE CARE OF THINGS AT HOME, OR GET ALONG WITH OTHER PEOPLE?: NOT DIFFICULT AT ALL
1. FEELING NERVOUS, ANXIOUS, OR ON EDGE: NOT AT ALL
GAD7 TOTAL SCORE: 1
7. FEELING AFRAID AS IF SOMETHING AWFUL MIGHT HAPPEN: NOT AT ALL
IF YOU CHECKED OFF ANY PROBLEMS ON THIS QUESTIONNAIRE, HOW DIFFICULT HAVE THESE PROBLEMS MADE IT FOR YOU TO DO YOUR WORK, TAKE CARE OF THINGS AT HOME, OR GET ALONG WITH OTHER PEOPLE: NOT DIFFICULT AT ALL
3. WORRYING TOO MUCH ABOUT DIFFERENT THINGS: NOT AT ALL

## 2023-02-06 ASSESSMENT — PATIENT HEALTH QUESTIONNAIRE - PHQ9
10. IF YOU CHECKED OFF ANY PROBLEMS, HOW DIFFICULT HAVE THESE PROBLEMS MADE IT FOR YOU TO DO YOUR WORK, TAKE CARE OF THINGS AT HOME, OR GET ALONG WITH OTHER PEOPLE: NOT DIFFICULT AT ALL
SUM OF ALL RESPONSES TO PHQ QUESTIONS 1-9: 2
SUM OF ALL RESPONSES TO PHQ QUESTIONS 1-9: 2

## 2023-02-06 NOTE — PROGRESS NOTES
"  Assessment & Plan     Benign essential hypertension  Well controlled  - amLODIPine (NORVASC) 10 MG tablet; Take 1 tablet (10 mg) by mouth daily  - lisinopril (ZESTRIL) 20 MG tablet; Take 1 tablet (20 mg) by mouth daily  - Basic metabolic panel; Future  - Basic metabolic panel    History of CVA (cerebrovascular accident) 10-12 w residual Rtdominant  sided weakness  Cont secondary prev- intolerant to asa. Cont clopidrogel  - Lipid Profile; Future  - Lipid Profile    Hyperlipidemia LDL goal <100  - atorvastatin (LIPITOR) 40 MG tablet; Take 1 tablet (40 mg) by mouth daily  - Lipid Profile; Future  - Lipid Profile    Recurrent major depressive disorder, remission status unspecified (H)  - FLUoxetine (PROZAC) 40 MG capsule; Take 1 capsule (40 mg) by mouth daily    Gastroesophageal reflux disease without esophagitis  - pantoprazole (PROTONIX) 40 MG EC tablet; TAKE 1 TABLET(40 MG) BY MOUTH DAILY 15 MINUTES BEFORE BREAKFAST    Anxiety state  - QUEtiapine (SEROQUEL) 100 MG tablet; TAKE 1 TABLET(100 MG) BY MOUTH AT BEDTIME  - QUEtiapine (SEROQUEL) 25 MG tablet; Take 1 tablet (25 mg) by mouth 2 times daily    Screen for colon cancer  - Colonoscopy Screening  Referral; Future    Need for vaccination  - Pneumococcal vaccine 23 valent PPSV23  (Pneumovax)    Review of the result(s) of each unique test - labs  Ordering of each unique test  Prescription drug management         BMI:   Estimated body mass index is 27.02 kg/m  as calculated from the following:    Height as of 10/19/22: 1.499 m (4' 11\").    Weight as of this encounter: 60.7 kg (133 lb 12.8 oz).       Regular exercise  See Patient Instructions    Return in about 3 months (around 5/6/2023) for Annual Wellness Visit.    Gianluca Fletcher MD  [facility]    Mi David is a 62 year old, presenting for the following health issues:  Recheck Medication      History of Present Illness       Mental Health Follow-up:  Patient " presents to follow-up on Anxiety.    Patient's anxiety since last visit has been:  Better  The patient is not having other symptoms associated with anxiety.  Any significant life events: grief or loss  Patient is not feeling anxious or having panic attacks.  Patient has no concerns about alcohol or drug use.    Hypertension: She presents for follow up of hypertension.  She does not check blood pressure  regularly outside of the clinic. Outpatient blood pressures have not been over 140/90. She does not follow a low salt diet.      Today's PHQ-9         PHQ-9 Total Score: 2    PHQ-9 Q9 Thoughts of better off dead/self-harm past 2 weeks :   Not at all    How difficult have these problems made it for you to do your work, take care of things at home, or get along with other people: Not difficult at all  Today's PAU-7 Score: 1             Review of Systems   Constitutional, HEENT, cardiovascular, pulmonary, gi and gu systems are negative, except as otherwise noted.      Objective    LMP  (LMP Unknown)   There is no height or weight on file to calculate BMI.  Physical Exam   GENERAL: healthy, alert and no distress  HENT: normal cephalic/atraumatic, nose and mouth without ulcers or lesions, oropharynx clear and oral mucous membranes moist  RESP: lungs clear to auscultation - no rales, rhonchi or wheezes  CV: regular rate and rhythm, normal S1 S2, no S3 or S4, no murmur, click or rub, no peripheral edema and peripheral pulses strong  MS: no gross musculoskeletal defects noted, no edema  SKIN: no suspicious lesions or rashes

## 2023-02-06 NOTE — LETTER
February 13, 2023      Joann Gonzalez  [address]   [facility] [address]        Dear ,    We are writing to inform you of your test results.    Your lab results are normal.      Resulted Orders   Basic metabolic panel   Result Value Ref Range    Sodium 143 136 - 145 mmol/L    Potassium 4.7 3.4 - 5.3 mmol/L    Chloride 105 98 - 107 mmol/L    Carbon Dioxide (CO2) 28 22 - 29 mmol/L    Anion Gap 10 7 - 15 mmol/L    Urea Nitrogen 10.7 8.0 - 23.0 mg/dL    Creatinine 0.79 0.51 - 0.95 mg/dL    Calcium 9.3 8.8 - 10.2 mg/dL    Glucose 98 70 - 99 mg/dL    GFR Estimate 83 >60 mL/min/1.73m2      Comment:      eGFR calculated using 2021 CKD-EPI equation.   Lipid Profile   Result Value Ref Range    Cholesterol 170 <200 mg/dL    Triglycerides 109 <150 mg/dL    Direct Measure HDL 65 >=50 mg/dL    LDL Cholesterol Calculated 83 <=100 mg/dL    Non HDL Cholesterol 105 <130 mg/dL    Narrative    Cholesterol  Desirable:  <200 mg/dL    Triglycerides  Normal:  Less than 150 mg/dL  Borderline High:  150-199 mg/dL  High:  200-499 mg/dL  Very High:  Greater than or equal to 500 mg/dL    Direct Measure HDL  Female:  Greater than or equal to 50 mg/dL   Male:  Greater than or equal to 40 mg/dL    LDL Cholesterol  Desirable:  <100mg/dL  Above Desirable:  100-129 mg/dL   Borderline High:  130-159 mg/dL   High:  160-189 mg/dL   Very High:  >= 190 mg/dL    Non HDL Cholesterol  Desirable:  130 mg/dL  Above Desirable:  130-159 mg/dL  Borderline High:  160-189 mg/dL  High:  190-219 mg/dL  Very High:  Greater than or equal to 220 mg/dL       If you have any questions or concerns, please call the clinic at the number listed above.       Sincerely,      Gianluca Fletcher MD

## 2023-03-14 DIAGNOSIS — I10 BENIGN ESSENTIAL HYPERTENSION: ICD-10-CM

## 2023-03-15 RX ORDER — AMLODIPINE BESYLATE 10 MG/1
10 TABLET ORAL DAILY
Qty: 90 TABLET | Refills: 3 | OUTPATIENT
Start: 2023-03-15

## 2023-04-05 DIAGNOSIS — F41.1 ANXIETY STATE: ICD-10-CM

## 2023-04-05 RX ORDER — QUETIAPINE FUMARATE 25 MG/1
25 TABLET, FILM COATED ORAL 2 TIMES DAILY
Qty: 180 TABLET | Refills: 0 | Status: SHIPPED | OUTPATIENT
Start: 2023-04-05 | End: 2023-10-03

## 2023-04-05 RX ORDER — QUETIAPINE FUMARATE 100 MG/1
TABLET, FILM COATED ORAL
Qty: 90 TABLET | Refills: 0 | Status: SHIPPED | OUTPATIENT
Start: 2023-04-05 | End: 2023-07-05

## 2023-07-05 DIAGNOSIS — F41.1 ANXIETY STATE: ICD-10-CM

## 2023-07-05 RX ORDER — QUETIAPINE FUMARATE 100 MG/1
TABLET, FILM COATED ORAL
Qty: 90 TABLET | Refills: 0 | Status: SHIPPED | OUTPATIENT
Start: 2023-07-05 | End: 2023-10-03

## 2023-07-05 NOTE — LETTER
July 6, 2023    Joann Gonzalez  [address]   [facility] [address]        Dear Joann,    While refilling your prescription, we noticed that you are due to have a IN PERSON visit with your Provider.  We will refill your prescription for 90 days, but that appointment must be made before any additional refills can be approved.     Taking care of your health is important to us and we look forward to seeing you in the near future.  Please call us at [phone] or [phone] (or use [employer]) to schedule.  Please disregard this notice if you have already made an appointment.        Sincerely,          [facility] Team

## 2023-08-16 DIAGNOSIS — Z86.73 HISTORY OF CVA (CEREBROVASCULAR ACCIDENT): Primary | ICD-10-CM

## 2023-08-17 RX ORDER — CLOPIDOGREL BISULFATE 75 MG/1
TABLET ORAL
Qty: 90 TABLET | Refills: 1 | Status: SHIPPED | OUTPATIENT
Start: 2023-08-17 | End: 2024-02-12

## 2023-09-04 ENCOUNTER — NURSE TRIAGE (OUTPATIENT)
Dept: NURSING | Facility: CLINIC | Age: 63
End: 2023-09-04
Payer: MEDICARE

## 2023-09-04 NOTE — TELEPHONE ENCOUNTER
"Nurse Triage SBAR    Is this a 2nd Level Triage? NO    Situation: Bloating, left abdominal cramping.    Background: Hx of gastric bypass, has been feeling bloated since early evening, cramping started early morning. Took tums over the counter gas relief. Drinks 4, 16 oz bottles of water/day.    Assessment: Last BM was 2 days ago, cramping pain rated at 4 on scale of 0-10, pain comes and goes, passing small amounts of gas, feeling constipated, last stool was difficult to pass as it was \"pasty\", afebrile.     Protocol Recommended Disposition:   Home Care    Recommendation: Should be able to care for/ monitor this at home. Discussed care advice and signs/ symptoms to call back about. Patient will follow instructions.     Brittaney Smith RN on 9/4/2023 at 5:07 AM      Reason for Disposition   Abdomen BLOATING (e.g., feeling full or gassy; no visible swelling)    Additional Information   Negative: Sounds like a life-threatening emergency to the triager   Negative: Chest pain   Negative: Menstrual cramps with bloating are main symptoms   Negative: [1] Abdomen pain is main symptom AND [2] female   Negative: [1] Abdomen pain is main symptom AND [2] male   Negative: Breathing difficulty is main symptom   Negative: Constipation is main symptom   Negative: Diarrhea is main symptom   Negative: Vomiting is main symptom   Negative: [1] MODERATE-SEVERE SWELLING of abdomen (e.g., looks very distended or swollen) AND [2] NEW-onset or much worse AND [3] vomiting   Negative: Blood in bowel movements  (Exception: Blood on surface of BM with constipation.)   Negative: Black or tarry bowel movements  (Exception: Chronic-unchanged black-grey BMs AND is taking iron pills or Pepto-Bismol.)   Negative: [1] MODERATE-SEVERE SWELLING of abdomen (e.g., looks very distended or swollen) AND [2] NEW-onset or much worse   Negative: [1] MILD SWELLING of abdomen (e.g., looks distended or swollen) AND [2] new-onset or getting worse AND [3] fever   " Negative: [1] MILD-MODERATE abdomen pain AND [2] constant AND [3] present > 2 hours   Negative: [1] Vomiting AND [2] contains bile (green color)   Negative: [1] MILD abdomen pain (e.g., does not interfere with normal activities) AND [2] pain comes and goes (cramps) AND [3] present > 48 hours  (Exception: Mild abdomen pain is a chronic symptom, recurrent or ongoing AND present > 4 weeks.)   Negative: White of the eyes have turned yellow (i.e., jaundice)   Negative: [1] MILD SWELLING of abdomen (e.g., looks distended or swollen) AND [2] new-onset or getting worse   Negative: [1] MILD SWELLING of abdomen AND [2] present > 1 week   Negative: [1] Mild diarrhea (e.g., 1-3 or more stools than normal in past 24 hours) without known cause AND [2] present >  7 days   Negative: [1] Abdomen BLOATING (e.g., feeling full or gassy; no visible swelling) lasts > 1 week AND [2] no improvement after using Care Advice   Negative: Abdomen BLOATING is a chronic symptom (recurrent or ongoing AND present > 4 weeks)    Protocols used: Abdomen Bloating and Swelling-A-AH

## 2023-10-03 DIAGNOSIS — F41.1 ANXIETY STATE: ICD-10-CM

## 2023-10-03 RX ORDER — QUETIAPINE FUMARATE 25 MG/1
25 TABLET, FILM COATED ORAL 2 TIMES DAILY
Qty: 180 TABLET | Refills: 0 | Status: SHIPPED | OUTPATIENT
Start: 2023-10-03 | End: 2024-03-25

## 2023-10-03 RX ORDER — QUETIAPINE FUMARATE 100 MG/1
TABLET, FILM COATED ORAL
Qty: 90 TABLET | Refills: 0 | Status: SHIPPED | OUTPATIENT
Start: 2023-10-03 | End: 2024-03-25

## 2024-02-12 DIAGNOSIS — Z86.73 HISTORY OF CVA (CEREBROVASCULAR ACCIDENT): ICD-10-CM

## 2024-02-12 RX ORDER — CLOPIDOGREL BISULFATE 75 MG/1
TABLET ORAL
Qty: 90 TABLET | Refills: 0 | Status: SHIPPED | OUTPATIENT
Start: 2024-02-12 | End: 2024-05-13

## 2024-02-12 NOTE — LETTER
[facility]  [address] [address]  [phone] (nurse line)    February 13, 2024      Joann Gonzalez  [address] [place] [id number]      Dear Joann Gonzalez    In reviewing your recent refill request for clopidogrel, it was noted that you are due for a follow up appointment with your physician within the next 60 days.. Approval for a 90 day supply of the medication has been sent to your pharmacy. Additional refills will be approved during your follow up visit.    Please contact our office at [phone] to schedule your appointment.      Sincerely,      [facility] Internal Medicine

## 2024-02-18 NOTE — TELEPHONE ENCOUNTER
Problem: GASTROINTESTINAL - ADULT  Goal: Minimal or absence of nausea and/or vomiting  Description: INTERVENTIONS:  - Administer IV fluids if ordered to ensure adequate hydration  - Maintain NPO status until nausea and vomiting are resolved  - Nasogastric tube if ordered  - Administer ordered antiemetic medications as needed  - Provide nonpharmacologic comfort measures as appropriate  - Advance diet as tolerated, if ordered  - Consider nutrition services referral to assist patient with adequate nutrition and appropriate food choices  Outcome: Progressing  Goal: Maintains or returns to baseline bowel function  Description: INTERVENTIONS:  - Assess bowel function  - Encourage oral fluids to ensure adequate hydration  - Administer IV fluids if ordered to ensure adequate hydration  - Administer ordered medications as needed  - Encourage mobilization and activity  - Consider nutritional services referral to assist patient with adequate nutrition and appropriate food choices  Outcome: Progressing  Goal: Maintains adequate nutritional intake  Description: INTERVENTIONS:  - Monitor percentage of each meal consumed  - Identify factors contributing to decreased intake, treat as appropriate  - Assist with meals as needed  - Monitor I&O, weight, and lab values if indicated  - Obtain nutrition services referral as needed  Outcome: Progressing  Goal: Oral mucous membranes remain intact  Description: INTERVENTIONS  - Assess oral mucosa and hygiene practices  - Implement preventative oral hygiene regimen  - Implement oral medicated treatments as ordered  - Initiate Nutrition services referral as needed  Outcome: Progressing     Problem: METABOLIC, FLUID AND ELECTROLYTES - ADULT  Goal: Electrolytes maintained within normal limits  Description: INTERVENTIONS:  - Monitor labs and assess patient for signs and symptoms of electrolyte imbalances  - Administer electrolyte replacement as ordered  - Monitor response to electrolyte  Filled per FM protocol.     LIZET OlivoN, RN  [employer] Triage     replacements, including repeat lab results as appropriate  - Instruct patient on fluid and nutrition as appropriate  Outcome: Progressing  Goal: Fluid balance maintained  Description: INTERVENTIONS:  - Monitor labs   - Monitor I/O and WT  - Instruct patient on fluid and nutrition as appropriate  - Assess for signs & symptoms of volume excess or deficit  Outcome: Progressing

## 2024-02-29 DIAGNOSIS — I10 BENIGN ESSENTIAL HYPERTENSION: ICD-10-CM

## 2024-02-29 RX ORDER — AMLODIPINE BESYLATE 10 MG/1
10 TABLET ORAL DAILY
Qty: 90 TABLET | Refills: 0 | Status: SHIPPED | OUTPATIENT
Start: 2024-02-29 | End: 2024-05-23

## 2024-03-25 DIAGNOSIS — I10 BENIGN ESSENTIAL HYPERTENSION: ICD-10-CM

## 2024-03-25 DIAGNOSIS — F41.1 ANXIETY STATE: ICD-10-CM

## 2024-03-25 DIAGNOSIS — E78.5 HYPERLIPIDEMIA LDL GOAL <100: ICD-10-CM

## 2024-03-25 RX ORDER — LISINOPRIL 20 MG/1
20 TABLET ORAL DAILY
Qty: 90 TABLET | Refills: 0 | Status: SHIPPED | OUTPATIENT
Start: 2024-03-25 | End: 2024-05-23

## 2024-03-25 RX ORDER — QUETIAPINE FUMARATE 100 MG/1
TABLET, FILM COATED ORAL
Qty: 90 TABLET | Refills: 0 | Status: SHIPPED | OUTPATIENT
Start: 2024-03-25 | End: 2024-05-23

## 2024-03-25 RX ORDER — ATORVASTATIN CALCIUM 40 MG/1
40 TABLET, FILM COATED ORAL DAILY
Qty: 90 TABLET | Refills: 0 | Status: SHIPPED | OUTPATIENT
Start: 2024-03-25 | End: 2024-05-23

## 2024-03-25 RX ORDER — QUETIAPINE FUMARATE 25 MG/1
25 TABLET, FILM COATED ORAL 2 TIMES DAILY
Qty: 180 TABLET | Refills: 0 | Status: SHIPPED | OUTPATIENT
Start: 2024-03-25 | End: 2024-05-23

## 2024-03-25 NOTE — TELEPHONE ENCOUNTER
Patient has annual wellness scheduled for 5/23/24 with Dr. Fletcher.  Needs refill before scheduled appt.

## 2024-04-11 DIAGNOSIS — F33.9 RECURRENT MAJOR DEPRESSIVE DISORDER, REMISSION STATUS UNSPECIFIED (H): ICD-10-CM

## 2024-04-12 RX ORDER — FLUOXETINE 40 MG/1
40 CAPSULE ORAL DAILY
Qty: 90 CAPSULE | Refills: 1 | Status: SHIPPED | OUTPATIENT
Start: 2024-04-12 | End: 2024-05-23

## 2024-05-13 DIAGNOSIS — Z86.73 HISTORY OF CVA (CEREBROVASCULAR ACCIDENT): ICD-10-CM

## 2024-05-13 RX ORDER — CLOPIDOGREL BISULFATE 75 MG/1
TABLET ORAL
Qty: 90 TABLET | Refills: 3 | Status: SHIPPED | OUTPATIENT
Start: 2024-05-13 | End: 2024-05-23

## 2024-05-23 ENCOUNTER — OFFICE VISIT (OUTPATIENT)
Dept: INTERNAL MEDICINE | Facility: CLINIC | Age: 64
End: 2024-05-23
Payer: MEDICARE

## 2024-05-23 VITALS
WEIGHT: 144 LBS | HEART RATE: 76 BPM | TEMPERATURE: 98.4 F | DIASTOLIC BLOOD PRESSURE: 60 MMHG | HEIGHT: 59 IN | OXYGEN SATURATION: 97 % | BODY MASS INDEX: 29.03 KG/M2 | RESPIRATION RATE: 16 BRPM | SYSTOLIC BLOOD PRESSURE: 114 MMHG

## 2024-05-23 DIAGNOSIS — E78.5 HYPERLIPIDEMIA LDL GOAL <100: ICD-10-CM

## 2024-05-23 DIAGNOSIS — Z98.84 HISTORY OF GASTRIC BYPASS: ICD-10-CM

## 2024-05-23 DIAGNOSIS — F33.9 RECURRENT MAJOR DEPRESSIVE DISORDER, REMISSION STATUS UNSPECIFIED (H): ICD-10-CM

## 2024-05-23 DIAGNOSIS — M25.552 GREATER TROCHANTERIC PAIN SYNDROME OF LEFT LOWER EXTREMITY: ICD-10-CM

## 2024-05-23 DIAGNOSIS — Z86.73 HISTORY OF CVA (CEREBROVASCULAR ACCIDENT): ICD-10-CM

## 2024-05-23 DIAGNOSIS — Z78.0 POSTMENOPAUSAL: ICD-10-CM

## 2024-05-23 DIAGNOSIS — F41.1 GAD (GENERALIZED ANXIETY DISORDER): ICD-10-CM

## 2024-05-23 DIAGNOSIS — Z12.11 SCREEN FOR COLON CANCER: ICD-10-CM

## 2024-05-23 DIAGNOSIS — Z87.891 PERSONAL HISTORY OF TOBACCO USE: ICD-10-CM

## 2024-05-23 DIAGNOSIS — I10 BENIGN ESSENTIAL HYPERTENSION: ICD-10-CM

## 2024-05-23 DIAGNOSIS — R26.81 GAIT INSTABILITY: ICD-10-CM

## 2024-05-23 DIAGNOSIS — Z12.31 VISIT FOR SCREENING MAMMOGRAM: ICD-10-CM

## 2024-05-23 DIAGNOSIS — Z13.9 ENCOUNTER FOR SCREENING INVOLVING SOCIAL DETERMINANTS OF HEALTH (SDOH): ICD-10-CM

## 2024-05-23 DIAGNOSIS — Z00.00 ENCOUNTER FOR MEDICARE ANNUAL WELLNESS EXAM: Primary | ICD-10-CM

## 2024-05-23 DIAGNOSIS — I25.10 CORONARY ARTERY CALCIFICATION SEEN ON CAT SCAN: ICD-10-CM

## 2024-05-23 LAB
ERYTHROCYTE [DISTWIDTH] IN BLOOD BY AUTOMATED COUNT: 13 % (ref 10–15)
HCT VFR BLD AUTO: 44.4 % (ref 35–47)
HGB BLD-MCNC: 14.7 G/DL (ref 11.7–15.7)
MCH RBC QN AUTO: 30 PG (ref 26.5–33)
MCHC RBC AUTO-ENTMCNC: 33.1 G/DL (ref 31.5–36.5)
MCV RBC AUTO: 91 FL (ref 78–100)
PLATELET # BLD AUTO: 237 10E3/UL (ref 150–450)
RBC # BLD AUTO: 4.9 10E6/UL (ref 3.8–5.2)
WBC # BLD AUTO: 6.7 10E3/UL (ref 4–11)

## 2024-05-23 PROCEDURE — 80061 LIPID PANEL: CPT | Performed by: INTERNAL MEDICINE

## 2024-05-23 PROCEDURE — 91320 SARSCV2 VAC 30MCG TRS-SUC IM: CPT | Performed by: INTERNAL MEDICINE

## 2024-05-23 PROCEDURE — G0296 VISIT TO DETERM LDCT ELIG: HCPCS | Performed by: INTERNAL MEDICINE

## 2024-05-23 PROCEDURE — 90480 ADMN SARSCOV2 VAC 1/ONLY CMP: CPT | Performed by: INTERNAL MEDICINE

## 2024-05-23 PROCEDURE — G0439 PPPS, SUBSEQ VISIT: HCPCS | Performed by: INTERNAL MEDICINE

## 2024-05-23 PROCEDURE — 85027 COMPLETE CBC AUTOMATED: CPT | Performed by: INTERNAL MEDICINE

## 2024-05-23 PROCEDURE — 80048 BASIC METABOLIC PNL TOTAL CA: CPT | Performed by: INTERNAL MEDICINE

## 2024-05-23 PROCEDURE — 99214 OFFICE O/P EST MOD 30 MIN: CPT | Mod: 25 | Performed by: INTERNAL MEDICINE

## 2024-05-23 PROCEDURE — 36415 COLL VENOUS BLD VENIPUNCTURE: CPT | Performed by: INTERNAL MEDICINE

## 2024-05-23 RX ORDER — AMLODIPINE BESYLATE 10 MG/1
10 TABLET ORAL DAILY
Qty: 90 TABLET | Refills: 3 | Status: SHIPPED | OUTPATIENT
Start: 2024-05-23

## 2024-05-23 RX ORDER — ATORVASTATIN CALCIUM 40 MG/1
40 TABLET, FILM COATED ORAL DAILY
Qty: 90 TABLET | Refills: 3 | Status: SHIPPED | OUTPATIENT
Start: 2024-05-23

## 2024-05-23 RX ORDER — QUETIAPINE FUMARATE 25 MG/1
25 TABLET, FILM COATED ORAL 2 TIMES DAILY
Qty: 180 TABLET | Refills: 3 | Status: SHIPPED | OUTPATIENT
Start: 2024-05-23

## 2024-05-23 RX ORDER — LISINOPRIL 20 MG/1
20 TABLET ORAL DAILY
Qty: 90 TABLET | Refills: 3 | Status: SHIPPED | OUTPATIENT
Start: 2024-05-23

## 2024-05-23 RX ORDER — FLUOXETINE 40 MG/1
40 CAPSULE ORAL DAILY
Qty: 90 CAPSULE | Refills: 3 | Status: SHIPPED | OUTPATIENT
Start: 2024-05-23

## 2024-05-23 RX ORDER — QUETIAPINE FUMARATE 100 MG/1
TABLET, FILM COATED ORAL
Qty: 90 TABLET | Refills: 3 | Status: SHIPPED | OUTPATIENT
Start: 2024-05-23

## 2024-05-23 RX ORDER — CLOPIDOGREL BISULFATE 75 MG/1
TABLET ORAL
Qty: 90 TABLET | Refills: 3 | Status: SHIPPED | OUTPATIENT
Start: 2024-05-23

## 2024-05-23 SDOH — HEALTH STABILITY: PHYSICAL HEALTH: ON AVERAGE, HOW MANY DAYS PER WEEK DO YOU ENGAGE IN MODERATE TO STRENUOUS EXERCISE (LIKE A BRISK WALK)?: 2 DAYS

## 2024-05-23 SDOH — HEALTH STABILITY: PHYSICAL HEALTH: ON AVERAGE, HOW MANY MINUTES DO YOU ENGAGE IN EXERCISE AT THIS LEVEL?: 10 MIN

## 2024-05-23 ASSESSMENT — ANXIETY QUESTIONNAIRES
3. WORRYING TOO MUCH ABOUT DIFFERENT THINGS: NOT AT ALL
4. TROUBLE RELAXING: NOT AT ALL
8. IF YOU CHECKED OFF ANY PROBLEMS, HOW DIFFICULT HAVE THESE MADE IT FOR YOU TO DO YOUR WORK, TAKE CARE OF THINGS AT HOME, OR GET ALONG WITH OTHER PEOPLE?: SOMEWHAT DIFFICULT
GAD7 TOTAL SCORE: 2
6. BECOMING EASILY ANNOYED OR IRRITABLE: SEVERAL DAYS
7. FEELING AFRAID AS IF SOMETHING AWFUL MIGHT HAPPEN: SEVERAL DAYS
5. BEING SO RESTLESS THAT IT IS HARD TO SIT STILL: NOT AT ALL
IF YOU CHECKED OFF ANY PROBLEMS ON THIS QUESTIONNAIRE, HOW DIFFICULT HAVE THESE PROBLEMS MADE IT FOR YOU TO DO YOUR WORK, TAKE CARE OF THINGS AT HOME, OR GET ALONG WITH OTHER PEOPLE: SOMEWHAT DIFFICULT
2. NOT BEING ABLE TO STOP OR CONTROL WORRYING: NOT AT ALL
GAD7 TOTAL SCORE: 2
7. FEELING AFRAID AS IF SOMETHING AWFUL MIGHT HAPPEN: SEVERAL DAYS
GAD7 TOTAL SCORE: 2
1. FEELING NERVOUS, ANXIOUS, OR ON EDGE: NOT AT ALL

## 2024-05-23 ASSESSMENT — PATIENT HEALTH QUESTIONNAIRE - PHQ9
SUM OF ALL RESPONSES TO PHQ QUESTIONS 1-9: 5
10. IF YOU CHECKED OFF ANY PROBLEMS, HOW DIFFICULT HAVE THESE PROBLEMS MADE IT FOR YOU TO DO YOUR WORK, TAKE CARE OF THINGS AT HOME, OR GET ALONG WITH OTHER PEOPLE: SOMEWHAT DIFFICULT
SUM OF ALL RESPONSES TO PHQ QUESTIONS 1-9: 5

## 2024-05-23 ASSESSMENT — SOCIAL DETERMINANTS OF HEALTH (SDOH): HOW OFTEN DO YOU GET TOGETHER WITH FRIENDS OR RELATIVES?: TWICE A WEEK
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
## 2024-05-23 NOTE — PROGRESS NOTES
Preventive Care Visit  [facility]  Gianluca Fletcher MD, Internal Medicine  May 23, 2024      Assessment & Plan     Encounter for Medicare annual wellness exam  Updated screening, immunizations, prevention.  Please see health maintenance list, care gaps     Greater trochanteric pain syndrome of left lower extremity  Prefers to let pain mgmt clinic address this.    History of CVA (cerebrovascular accident) 10-12 w residual Rtdominant  sided weakness  Secondary prevention  - Lipid panel reflex to direct LDL Fasting; Future  - Basic metabolic panel  (Ca, Cl, CO2, Creat, Gluc, K, Na, BUN); Future  - clopidogrel (PLAVIX) 75 MG tablet; TAKE 1 TABLET(75 MG) BY MOUTH DAILY  - Basic metabolic panel  (Ca, Cl, CO2, Creat, Gluc, K, Na, BUN)    Coronary artery calcification seen on CAT scan  Hyperlipidemia LDL goal <100  Cont statin   - atorvastatin (LIPITOR) 40 MG tablet; Take 1 tablet (40 mg) by mouth daily  - CBC with platelets; Future  - Lipid panel reflex to direct LDL Non-fasting  - Basic metabolic panel  (Ca, Cl, CO2, Creat, Gluc, K, Na, BUN)  - CBC with platelets    Benign essential hypertension  Well controlled- cont meds  - amLODIPine (NORVASC) 10 MG tablet; Take 1 tablet (10 mg) by mouth daily  - lisinopril (ZESTRIL) 20 MG tablet; Take 1 tablet (20 mg) by mouth daily    Recurrent major depressive disorder, remission status unspecified (H24)  Cont ssri   - FLUoxetine (PROZAC) 40 MG capsule; Take 1 capsule (40 mg) by mouth daily    PAU (generalized anxiety disorder)  - QUEtiapine (SEROQUEL) 100 MG tablet; TAKE 1 TABLET(100 MG) BY MOUTH AT BEDTIME  - QUEtiapine (SEROQUEL) 25 MG tablet; Take 1 tablet (25 mg) by mouth 2 times daily    Gait instability  - Physical Therapy  Referral; Future    Postmenopausal  - DEXA HIP/PELVIS/SPINE - Future; Future    Encounter for screening involving social determinants of health (SDoH)  - Primary Care - Care Coordination Referral; Future    Visit for  "screening mammogram  - MA SCREENING DIGITAL BILAT - Future  (s+30); Future    Personal history of tobacco use  - Prof fee: Shared Decision Making for Lung Cancer Screening  - CT Chest Lung Cancer Scrn Low Dose wo; Future    Screen for colon cancer  - Colonoscopy Screening  Referral; Future    History of gastric bypass  - CBC with platelets; Future  - CBC with platelets    Patient has been advised of split billing requirements and indicates understanding: Yes    BMI  Estimated body mass index is 29.08 kg/m  as calculated from the following:    Height as of this encounter: 1.499 m (4' 11\").    Weight as of this encounter: 65.3 kg (144 lb).       Counseling  Appropriate preventive services were discussed with this patient, including applicable screening as appropriate for fall prevention, nutrition, physical activity, Tobacco-use cessation, weight loss and cognition.  Checklist reviewing preventive services available has been given to the patient.  Reviewed patient's diet, addressing concerns and/or questions.   She is at risk for lack of exercise and has been provided with information to increase physical activity for the benefit of her well-being.   The patient was instructed to see the dentist every 6 months.   She is at risk for psychosocial distress and has been provided with information to reduce risk.   The patient's PHQ-9 score is consistent with mild depression. She was provided with information regarding depression.   I have reviewed Opioid Use Disorder and Substance Use Disorder risk factors and made any needed referrals.       Work on weight loss  Regular exercise  See Patient Instructions    Mi David is a 63 year old, presenting for the following:  Medicare Visit      Health Care Directive  Patient does not have a Health Care Directive or Living Will: Discussed advance care planning with patient; however, patient declined at this time.    HPI              5/23/2024   General Health "   How would you rate your overall physical health? Good   Feel stress (tense, anxious, or unable to sleep) Only a little   (!) STRESS CONCERN      5/23/2024   Nutrition   Diet: Regular (no restrictions)         5/23/2024   Exercise   Days per week of moderate/strenous exercise 2 days   Average minutes spent exercising at this level 10 min   (!) EXERCISE CONCERN      5/23/2024   Social Factors   Frequency of gathering with friends or relatives Twice a week   Worry food won't last until get money to buy more No   Food not last or not have enough money for food? No   Do you have housing?  No   Are you worried about losing your housing? No   Lack of transportation? No   Unable to get utilities (heat,electricity)? No   Want help with housing or utility concern? (!) YES   (!) HOUSING CONCERN PRESENT      5/23/2024   Fall Risk   Fallen 2 or more times in the past year? No    No   Trouble with walking or balance? Yes    Yes   Gait Speed Test (Document in seconds) 7.9   Gait Speed Test Interpretation Greater than 5.01 seconds - ABNORMAL          5/23/2024   Activities of Daily Living- Home Safety   Needs help with the following daily activites None of the above   Safety concerns in the home None of the above         5/23/2024   Dental   Dentist two times every year? (!) NO         5/23/2024   Hearing Screening   Hearing concerns? None of the above         5/23/2024   Driving Risk Screening   Patient/family members have concerns about driving No         5/23/2024   General Alertness/Fatigue Screening   Have you been more tired than usual lately? No         5/23/2024   Urinary Incontinence Screening   Bothered by leaking urine in past 6 months No         5/23/2024   TB Screening   Were you born outside of the [place]? No       Today's PHQ-9 Score:       5/23/2024    12:24 PM   PHQ-9 SCORE   PHQ-9 Total Score MyChart 5 (Mild depression)   PHQ-9 Total Score 5         5/23/2024   Substance Use   Alcohol more than 3/day or more than  7/wk No   Do you have a current opioid prescription? (!) YES   How severe/bad is pain from 1 to 10? 4/10   Do you use any other substances recreationally? 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    Drug use: No          Mammogram Screening - Mammogram every 1-2 years updated in Health Maintenance based on mutual decision making      History of abnormal Pap smear: s/p benign hysterectomy       ASCVD Risk   The ASCVD Risk score (Ramírez DK, et al., 2019) failed to calculate for the following reasons:    The patient has a prior MI or stroke diagnosis            Reviewed and updated as needed this visit by Provider                    Lab work is in process  Labs reviewed in EPIC  Current providers sharing in care for this patient include:  Patient Care Team:  No Ref-Primary, Physician as PCP - General  Chetna Kaufman RN CCM as Clinic Care Coordinator  Esau Ospina MD ([facility])  Gianluca Fletcher MD as Assigned PCP    The following health maintenance items are reviewed in Epic and correct as of today:  Health Maintenance   Topic Date Due    DEXA  Never done    ZOSTER IMMUNIZATION (1 of 2) Never done    LUNG CANCER SCREENING  Never done    RSV VACCINE (Pregnancy & 60+) (1 - 1-dose 60+ series) Never done    MAMMO SCREENING  2019    COLORECTAL CANCER SCREENING  2022    ANNUAL REVIEW OF HM ORDERS  2023    MEDICARE ANNUAL WELLNESS VISIT  2023    COVID-19 Vaccine ( season) 2023    LIPID  2024    INFLUENZA VACCINE (Season  "Ended) 09/01/2024    PHQ-9  11/23/2024    DTAP/TDAP/TD IMMUNIZATION (4 - Td or Tdap) 04/05/2025    PAU ASSESSMENT  05/23/2025    FALL RISK ASSESSMENT  05/23/2025    GLUCOSE  02/06/2026    ADVANCE CARE PLANNING  10/19/2027    Pneumococcal Vaccine: 65+ Years (3 of 3 - PPSV23 or PCV20) 02/06/2028    HEPATITIS C SCREENING  Completed    HIV SCREENING  Completed    DEPRESSION ACTION PLAN  Completed    MIGRAINE ACTION PLAN  Completed    IPV IMMUNIZATION  Aged Out    HPV IMMUNIZATION  Aged Out    MENINGITIS IMMUNIZATION  Aged Out    RSV MONOCLONAL ANTIBODY  Aged Out            Objective    Exam  /60   Pulse 76   Temp 98.4  F (36.9  C) (Temporal)   Resp 16   Ht 1.499 m (4' 11\")   Wt 65.3 kg (144 lb)   LMP  (LMP Unknown)   SpO2 97%   BMI 29.08 kg/m     Estimated body mass index is 29.08 kg/m  as calculated from the following:    Height as of this encounter: 1.499 m (4' 11\").    Weight as of this encounter: 65.3 kg (144 lb).    Physical Exam  GENERAL: alert and no distress  EYES: Eyes grossly normal to inspection, PERRL and conjunctivae and sclerae normal  HENT: normal cephalic/atraumatic, nose and mouth without ulcers or lesions, oropharynx clear, and oral mucous membranes moist  NECK: no adenopathy, no asymmetry, masses, or scars  RESP: lungs clear to auscultation - no rales, rhonchi or wheezes  CV: regular rate and rhythm, normal S1 S2, no S3 or S4, no murmur, click or rub, no peripheral edema  ABDOMEN: soft, nontender, no hepatosplenomegaly, no masses and bowel sounds normal  MS: no gross musculoskeletal defects noted, no edema  SKIN: no suspicious lesions or rashes  NEURO: Normal strength and tone, mentation intact and speech normal  PSYCH: mentation appears normal, affect normal/bright  LYMPH: no cervical adenopathy        5/23/2024   Mini Cog   Clock Draw Score 2 Normal   3 Item Recall 3 objects recalled   Mini Cog Total Score 5              Signed Electronically by: Gianluca Fletcher MD    Lung Cancer " Screening Shared Decision Making Visit     Joann Gonzalez, a 63 year old female, is eligible for lung cancer screening    History   Smoking Status    Former    Types: Cigarettes   Smokeless Tobacco    Never       I have discussed with patient the risks and benefits of screening for lung cancer with low-dose CT.     The risks include:    radiation exposure: one low dose chest CT has as much ionizing radiation as about 15 chest x-rays, or 6 months of background radiation living in [place]      false positives: most findings/nodules are NOT cancer, but some might still require additional diagnostic evaluation, including biopsy    over-diagnosis: some slow growing cancers that might never have been clinically significant will be detected and treated unnecessarily     The benefit of early detection of lung cancer is contingent upon adherence to annual screening or more frequent follow up if indicated.     Furthermore, to benefit from screening, Joann must be willing and able to undergo diagnostic procedures, if indicated. Although no specific guide is available for determining severity of comorbidities, it is reasonable to withhold screening in patients who have greater mortality risk from other diseases.     We did discuss that the best way to prevent lung cancer is to not smoke.    Some patients may value a numeric estimation of lung cancer risk when evaluating if lung cancer screening is right for them, here is one calculator:    ShouldIScreen

## 2024-05-23 NOTE — LETTER
Noemy 3, 2024      Joann Gonzalez  [address]   [facility] [id number]        Dear ,    We are writing to inform you of your test results.    Your recent lab results were normal.    Resulted Orders   Lipid panel reflex to direct LDL Non-fasting   Result Value Ref Range    Cholesterol 139 <200 mg/dL    Triglycerides 89 <150 mg/dL    Direct Measure HDL 57 >=50 mg/dL    LDL Cholesterol Calculated 64 <=100 mg/dL    Non HDL Cholesterol 82 <130 mg/dL    Patient Fasting > 8hrs? Yes     Narrative    Cholesterol  Desirable:  <200 mg/dL    Triglycerides  Normal:  Less than 150 mg/dL  Borderline High:  150-199 mg/dL  High:  200-499 mg/dL  Very High:  Greater than or equal to 500 mg/dL    Direct Measure HDL  Female:  Greater than or equal to 50 mg/dL   Male:  Greater than or equal to 40 mg/dL    LDL Cholesterol  Desirable:  <100mg/dL  Above Desirable:  100-129 mg/dL   Borderline High:  130-159 mg/dL   High:  160-189 mg/dL   Very High:  >= 190 mg/dL    Non HDL Cholesterol  Desirable:  130 mg/dL  Above Desirable:  130-159 mg/dL  Borderline High:  160-189 mg/dL  High:  190-219 mg/dL  Very High:  Greater than or equal to 220 mg/dL   Basic metabolic panel  (Ca, Cl, CO2, Creat, Gluc, K, Na, BUN)   Result Value Ref Range    Sodium 141 135 - 145 mmol/L      Comment:      Reference intervals for this test were updated on 09/26/2023 to more accurately reflect our healthy population. There may be differences in the flagging of prior results with similar values performed with this method. Interpretation of those prior results can be made in the context of the updated reference intervals.     Potassium 5.3 3.4 - 5.3 mmol/L    Chloride 104 98 - 107 mmol/L    Carbon Dioxide (CO2) 29 22 - 29 mmol/L    Anion Gap 8 7 - 15 mmol/L    Urea Nitrogen 8.3 8.0 - 23.0 mg/dL    Creatinine 0.75 0.51 - 0.95 mg/dL    GFR Estimate 88 >60 mL/min/1.73m2    Calcium 9.3 8.8 - 10.2 mg/dL    Glucose 99 70 - 99 mg/dL    Patient Fasting > 8hrs?  Yes    CBC with platelets   Result Value Ref Range    WBC Count 6.7 4.0 - 11.0 10e3/uL    RBC Count 4.90 3.80 - 5.20 10e6/uL    Hemoglobin 14.7 11.7 - 15.7 g/dL    Hematocrit 44.4 35.0 - 47.0 %    MCV 91 78 - 100 fL    MCH 30.0 26.5 - 33.0 pg    MCHC 33.1 31.5 - 36.5 g/dL    RDW 13.0 10.0 - 15.0 %    Platelet Count 237 150 - 450 10e3/uL       If you have any questions or concerns, please call the clinic at the number listed above.       Sincerely,      Gianluca Fletcher MD

## 2024-05-23 NOTE — PATIENT INSTRUCTIONS
"Preventive Care Advice   This is general advice we often give to help people stay healthy. Your care team may have specific advice just for you. Please talk to your care team about your own preventive care needs.  Lifestyle  Exercise at least 150 minutes each week (30 minutes a day, 5 days a week).  Do muscle strengthening activities 2 days a week. These help control your weight and prevent disease.  No smoking.  Wear sunscreen to prevent skin cancer.  Have your home tested for radon every 2 to 5 years. Radon is a colorless, odorless gas that can harm your lungs. To learn more, go to www.health.[facility].mn. and search for \"Radon in Homes.\"  Keep guns unloaded and locked up in a safe place like a safe or gun vault, or, use a gun lock and hide the keys. Always lock away bullets separately. To learn more, visit [employer].mn.gov and search for \"safe gun storage.\"  Nutrition  Eat 5 or more servings of fruits and vegetables each day.  Try wheat bread, brown rice and whole grain pasta (instead of white bread, rice, and pasta).  Get enough calcium and vitamin D. Check the label on foods and aim for 100% of the RDA (recommended daily allowance).  Regular exams  Have a dental exam and cleaning every 6 months.  See your health care team every year to talk about:  Any changes in your health.  Any medicines your care team has prescribed.  Preventive care, family planning, and ways to prevent chronic diseases.  Shots (vaccines)   HPV shots (up to age 26), if you've never had them before.  Hepatitis B shots (up to age 59), if you've never had them before.  COVID-19 shot: Get this shot when it's due.  Flu shot: Get a flu shot every year.  Tetanus shot: Get a tetanus shot every 10 years.  Pneumococcal, hepatitis A, and RSV shots: Ask your care team if you need these based on your risk.  Shingles shot (for age 50 and up).  General health tests  Diabetes screening:  Starting at age 35, Get screened for diabetes at least every 3 years.  If " you are younger than age 35, ask your care team if you should be screened for diabetes.  Cholesterol test: At age 39, start having a cholesterol test every 5 years, or more often if advised.  Bone density scan (DEXA): At age 50, ask your care team if you should have this scan for osteoporosis (brittle bones).  Hepatitis C: Get tested at least once in your life.  Abdominal aortic aneurysm screening: Talk to your doctor about having this screening if you:  Have ever smoked; and  Are biologically male; and  Are between the ages of 65 and 75.  STIs (sexually transmitted infections)  Before age 24: Ask your care team if you should be screened for STIs.  After age 24: Get screened for STIs if you're at risk. You are at risk for STIs (including HIV) if:  You are sexually active with more than one person.  You don't use condoms every time.  You or a partner was diagnosed with a sexually transmitted infection.  If you are at risk for HIV, ask about PrEP medicine to prevent HIV.  Get tested for HIV at least once in your life, whether you are at risk for HIV or not.  Cancer screening tests  Cervical cancer screening: If you have a cervix, begin getting regular cervical cancer screening tests at age 21. Most people who have regular screenings with normal results can stop after age 65. Talk about this with your provider.  Breast cancer scan (mammogram): If you've ever had breasts, begin having regular mammograms starting at age 40. This is a scan to check for breast cancer.  Colon cancer screening: It is important to start screening for colon cancer at age 45.  Have a colonoscopy test every 10 years (or more often if you're at risk) Or, ask your provider about stool tests like a FIT test every year or Cologuard test every 3 years.  To learn more about your testing options, visit: www.[employer]/877202.pdf.  For help making a decision, visit: ca/al93500.  Prostate cancer screening test: If you have a prostate and are age 55  to 69, ask your provider if you would benefit from a yearly prostate cancer screening test.  Lung cancer screening: If you are a current or former smoker age 50 to 80, ask your care team if ongoing lung cancer screenings are right for you.  For informational purposes only. Not to replace the advice of your health care provider. Copyright   2023 [employer]. All rights reserved. Clinically reviewed by the [facility]. Trampoline [id number] - REV 04/24.    Preventing Falls: Care Instructions  Injuries and health problems such as trouble walking or poor eyesight can increase your risk of falling. So can some medicines. But there are things you can do to help prevent falls. You can exercise to get stronger. You can also arrange your home to make it safer.    Talk to your doctor about the medicines you take. Ask if any of them increase the risk of falls and whether they can be changed or stopped.   Try to exercise regularly. It can help improve your strength and balance. This can help lower your risk of falling.     Practice fall safety and prevention.    Wear low-heeled shoes that fit well and give your feet good support. Talk to your doctor if you have foot problems that make this hard.  Carry a cellphone or wear a medical alert device that you can use to call for help.  Use stepladders instead of chairs to reach high objects. Don't climb if you're at risk for falls. Ask for help, if needed.  Wear the correct eyeglasses, if you need them.    Make your home safer.    Remove rugs, cords, clutter, and furniture from walkways.  Keep your house well lit. Use night-lights in hallways and bathrooms.  Install and use sturdy handrails on stairways.  Wear nonskid footwear, even inside. Don't walk barefoot or in socks without shoes.    Be safe outside.    Use handrails, curb cuts, and ramps whenever possible.  Keep your hands free by using a shoulder bag or backpack.  Try to walk in well-lit  "areas. Watch out for uneven ground, changes in pavement, and debris.  Be careful in the winter. Walk on the grass or gravel when sidewalks are slippery. Use de-icer on steps and walkways. Add non-slip devices to shoes.    Put grab bars and nonskid mats in your shower or tub and near the toilet. Try to use a shower chair or bath bench when bathing.   Get into a tub or shower by putting in your weaker leg first. Get out with your strong side first. Have a phone or medical alert device in the bathroom with you.   Where can you learn more?  Go to https://www.[employer]/patiented  Enter G117 in the search box to learn more about \"Preventing Falls: Care Instructions.\"  Current as of: July 17, 2023               Content Version: 14.0    5970-3314 [employer].   Care instructions adapted under license by your healthcare professional. If you have questions about a medical condition or this instruction, always ask your healthcare professional. [employer] disclaims any warranty or liability for your use of this information.      Learning About Stress  What is stress?     Stress is your body's response to a hard situation. Your body can have a physical, emotional, or mental response. Stress is a fact of life for most people, and it affects everyone differently. What causes stress for you may not be stressful for someone else.  A lot of things can cause stress. You may feel stress when you go on a job interview, take a test, or run a race. This kind of short-term stress is normal and even useful. It can help you if you need to work hard or react quickly. For example, stress can help you finish an important job on time.  Long-term stress is caused by ongoing stressful situations or events. Examples of long-term stress include long-term health problems, ongoing problems at work, or conflicts in your family. Long-term stress can harm your health.  How does stress affect your health?  When you are " stressed, your body responds as though you are in danger. It makes hormones that speed up your heart, make you breathe faster, and give you a burst of energy. This is called the fight-or-flight stress response. If the stress is over quickly, your body goes back to normal and no harm is done.  But if stress happens too often or lasts too long, it can have bad effects. Long-term stress can make you more likely to get sick, and it can make symptoms of some diseases worse. If you tense up when you are stressed, you may develop neck, shoulder, or low back pain. Stress is linked to high blood pressure and heart disease.  Stress also harms your emotional health. It can make you keller, tense, or depressed. Your relationships may suffer, and you may not do well at work or school.  What can you do to manage stress?  You can try these things to help manage stress:   Do something active. Exercise or activity can help reduce stress. Walking is a great way to get started. Even everyday activities such as housecleaning or yard work can help.  Try yoga or yudi chi. These techniques combine exercise and meditation. You may need some training at first to learn them.  Do something you enjoy. For example, listen to music or go to a movie. Practice your hobby or do volunteer work.  Meditate. This can help you relax, because you are not worrying about what happened before or what may happen in the future.  Do guided imagery. Imagine yourself in any setting that helps you feel calm. You can use online videos, books, or a teacher to guide you.  Do breathing exercises. For example:  From a standing position, bend forward from the waist with your knees slightly bent. Let your arms dangle close to the floor.  Breathe in slowly and deeply as you return to a standing position. Roll up slowly and lift your head last.  Hold your breath for just a few seconds in the standing position.  Breathe out slowly and bend forward from the waist.  Let your  "feelings out. Talk, laugh, cry, and express anger when you need to. Talking with supportive friends or family, a counselor, or a popeye leader about your feelings is a healthy way to relieve stress. Avoid discussing your feelings with people who make you feel worse.  Write. It may help to write about things that are bothering you. This helps you find out how much stress you feel and what is causing it. When you know this, you can find better ways to cope.  What can you do to prevent stress?  You might try some of these things to help prevent stress:  Manage your time. This helps you find time to do the things you want and need to do.  Get enough sleep. Your body recovers from the stresses of the day while you are sleeping.  Get support. Your family, friends, and community can make a difference in how you experience stress.  Limit your news feed. Avoid or limit time on social media or news that may make you feel stressed.  Do something active. Exercise or activity can help reduce stress. Walking is a great way to get started.  Where can you learn more?  Go to https://www.[employer].net/patiented  Enter N032 in the search box to learn more about \"Learning About Stress.\"  Current as of: October 24, 2023               Content Version: 14.0    9151-9257 [employer].   Care instructions adapted under license by your healthcare professional. If you have questions about a medical condition or this instruction, always ask your healthcare professional. [employer] disclaims any warranty or liability for your use of this information.      Learning About Depression Screening  What is depression screening?  Depression screening is a way to see if you have depression symptoms. It may be done by a doctor or counselor. It's often part of a routine checkup. That's because your mental health is just as important as your physical health.  Depression is a mental health condition that affects how you feel, " "think, and act. You may:  Have less energy.  Lose interest in your daily activities.  Feel sad and grouchy for a long time.  Depression is very common. It affects people of all ages.  Many things can lead to depression. Some people become depressed after they have a stroke or find out they have a major illness like cancer or heart disease. The death of a loved one or a breakup may lead to depression. It can run in families. Most experts believe that a combination of inherited genes and stressful life events can cause it.  What happens during screening?  You may be asked to fill out a form about your depression symptoms. You and the doctor will discuss your answers. The doctor may ask you more questions to learn more about how you think, act, and feel.  What happens after screening?  If you have symptoms of depression, your doctor will talk to you about your options.  Doctors usually treat depression with medicines or counseling. Often, combining the two works best. Many people don't get help because they think that they'll get over the depression on their own. But people with depression may not get better unless they get treatment.  The cause of depression is not well understood. There may be many factors involved. But if you have depression, it's not your fault.  A serious symptom of depression is thinking about death or suicide. If you or someone you care about talks about this or about feeling hopeless, get help right away.  It's important to know that depression can be treated. Medicine, counseling, and self-care may help.  Where can you learn more?  Go to https://www.[employer].net/patiented  Enter T185 in the search box to learn more about \"Learning About Depression Screening.\"  Current as of: June 24, 2023               Content Version: 14.0    1699-4282 [employer].   Care instructions adapted under license by your healthcare professional. If you have questions about a medical condition or this " instruction, always ask your healthcare professional. [employer] disclaims any warranty or liability for your use of this information.      Chronic Pain: Care Instructions  Your Care Instructions     Chronic pain is pain that lasts a long time (months or even years) and may or may not have a clear cause. It is different from acute pain, which usually does have a clear cause--like an injury or illness--and gets better over time. Chronic pain:  Lasts over time but may vary from day to day.  Does not go away despite efforts to end it.  May disrupt your sleep and lead to fatigue.  May cause depression or anxiety.  May make your muscles tense, causing more pain.  Can disrupt your work, hobbies, home life, and relationships with friends and family.  Chronic pain is a very real condition. It is not just in your head. Treatment can help and usually includes several methods used together, such as medicines, physical therapy, exercise, and other treatments. Learning how to relax and changing negative thought patterns can also help you cope.  Chronic pain is complex. Taking an active role in your treatment will help you better manage your pain. Tell your doctor if you have trouble dealing with your pain. You may have to try several things before you find what works best for you.  Follow-up care is a key part of your treatment and safety. Be sure to make and go to all appointments, and call your doctor if you are having problems. It's also a good idea to know your test results and keep a list of the medicines you take.  How can you care for yourself at home?  Pace yourself. Break up large jobs into smaller tasks. Save harder tasks for days when you have less pain, or go back and forth between hard tasks and easier ones. Take rest breaks.  Relax, and reduce stress. Relaxation techniques such as deep breathing or meditation can help.  Keep moving. Gentle, daily exercise can help reduce pain over the long run. Try  low- or no-impact exercises such as walking, swimming, and stationary biking. Do stretches to stay flexible.  Try heat, cold packs, and massage.  Get enough sleep. Chronic pain can make you tired and drain your energy. Talk with your doctor if you have trouble sleeping because of pain.  Think positive. Your thoughts can affect your pain level. Do things that you enjoy to distract yourself when you have pain instead of focusing on the pain. See a movie, read a book, listen to music, or spend time with a friend.  If you think you are depressed, talk to your doctor about treatment.  Keep a daily pain diary. Record how your moods, thoughts, sleep patterns, activities, and medicine affect your pain. You may find that your pain is worse during or after certain activities or when you are feeling a certain emotion. Having a record of your pain can help you and your doctor find the best ways to treat your pain.  Take pain medicines exactly as directed.  If the doctor gave you a prescription medicine for pain, take it as prescribed.  If you are not taking a prescription pain medicine, ask your doctor if you can take an over-the-counter medicine.  Reducing constipation caused by pain medicine  Talk to your doctor about a laxative. If a laxative doesn't work, your doctor may suggest a prescription medicine.  Include fruits, vegetables, beans, and whole grains in your diet each day. These foods are high in fiber.  If your doctor recommends it, get more exercise. Walking is a good choice. Bit by bit, increase the amount you walk every day. Try for at least 30 minutes on most days of the week.  Schedule time each day for a bowel movement. A daily routine may help. Take your time and do not strain when having a bowel movement.  When should you call for help?   Call your doctor now or seek immediate medical care if:    Your pain gets worse or is out of control.     You feel down or blue, or you do not enjoy things like you once did.  "You may be depressed, which is common in people with chronic pain. Depression can be treated.     You have vomiting or cramps for more than 2 hours.   Watch closely for changes in your health, and be sure to contact your doctor if:    You cannot sleep because of pain.     You are very worried or anxious about your pain.     You have trouble taking your pain medicine.     You have any concerns about your pain medicine.     You have trouble with bowel movements, such as:  No bowel movement in 3 days.  Blood in the anal area, in your stool, or on the toilet paper.  Diarrhea for more than 24 hours.   Where can you learn more?  Go to https://www.[employer].net/patiented  Enter N004 in the search box to learn more about \"Chronic Pain: Care Instructions.\"  Current as of: July 10, 2023               Content Version: 14.0    6566-2731 [employer].   Care instructions adapted under license by your healthcare professional. If you have questions about a medical condition or this instruction, always ask your healthcare professional. [employer] disclaims any warranty or liability for your use of this information.      Lung Cancer Screening   Frequently Asked Questions  If you are at high-risk for lung cancer, getting screened with low-dose computed tomography (LDCT) every year can help save your life. This handout offers answers to some of the most common questions about lung cancer screening. If you have other questions, please call [phone] ([phone]).     What is it?  Lung cancer screening uses special X-ray technology to create an image of your lung tissue. The exam is quick and easy and takes less than 10 seconds. We don t give you any medicine or use any needles. You can eat before and after the exam. You don t need to change your clothes as long as the clothing on your chest doesn t contain metal. But, you do need to be able to hold your breath for at least 6 seconds during the " exam.    What is the goal of lung cancer screening?  The goal of lung cancer screening is to save lives. Many times, lung cancer is not found until a person starts having physical symptoms. Lung cancer screening can help detect lung cancer in the earliest stages when it may be easier to treat.    Who should be screened for lung cancer?  We suggest lung cancer screening for anyone who is at high-risk for lung cancer. You are in the high-risk group if you:      are between the ages of 55 and 79, and    have smoked at least 1 pack of cigarettes a day for 20 or more years, and    still smoke or have quit within the past 15 years.    However, if you have a new cough or shortness of breath, you should talk to your doctor before being screened.    Why does it matter if I have symptoms?  Certain symptoms can be a sign that you have a condition in your lungs that should be checked and treated by your doctor. These symptoms include fever, chest pain, a new or changing cough, shortness of breath that you have never felt before, coughing up blood or unexplained weight loss. Having any of these symptoms can greatly affect the results of lung cancer screening.       Should all smokers get an LDCT lung cancer screening exam?  It depends. Lung cancer screening is for a very specific group of men and women who have a history of heavy smoking over a long period of time (see  Who should be screened for lung cancer  above).  I am in the high-risk group, but have been diagnosed with cancer in the past. Is LDCT lung cancer screening right for me?  In some cases, you should not have LDCT lung screening, such as when your doctor is already following your cancer with CT scan studies. Your doctor will help you decide if LDCT lung screening is right for you.  Do I need to have a screening exam every year?  Yes. If you are in the high-risk group described earlier, you should get an LDCT lung cancer screening exam every year until you are 79,  or are no longer willing or able to undergo screening and possible procedures to diagnose and treat lung cancer.  How effective is LDCT at preventing death from lung cancer?  Studies have shown that LDCT lung cancer screening can lower the risk of death from lung cancer by 20 percent in people who are at high-risk.  What are the risks?  There are some risks and limitations of LDCT lung cancer screening. We want to make sure you understand the risks and benefits, so please let us know if you have any questions. Your doctor may want to talk with you more about these risks.    Radiation exposure: As with any exam that uses radiation, there is a very small increased risk of cancer. The amount of radiation in LDCT is small--about the same amount a person would get from a mammogram. Your doctor orders the exam when he or she feels the potential benefits outweigh the risks.    False negatives: No test is perfect, including LDCT. It is possible that you may have a medical condition, including lung cancer, that is not found during your exam. This is called a false negative result.    False positives and more testing: LDCT very often finds something in the lung that could be cancer, but in fact is not. This is called a false positive result. False positive tests often cause anxiety. To make sure these findings are not cancer, you may need to have more tests. These tests will be done only if you give us permission. Sometimes patients need a treatment that can have side effects, such as a biopsy. For more information on false positives, see  What can I expect from the results?     Findings not related to lung cancer: Your LDCT exam also takes pictures of areas of your body next to your lungs. In a very small number of cases, the CT scan will show an abnormal finding in one of these areas, such as your kidneys, adrenal glands, liver or thyroid. This finding may not be serious, but you may need more tests. Your doctor can help  you decide what other tests you may need, if any.  What can I expect from the results?  About 1 out of 4 LDCT exams will find something that may need more tests. Most of the time, these findings are lung nodules. Lung nodules are very small collections of tissue in the lung. These nodules are very common, and the vast majority--more than 97 percent--are not cancer (benign). Most are normal lymph nodes or small areas of scarring from past infections.  But, if a small lung nodule is found to be cancer, the cancer can be cured more than 90 percent of the time. To know if the nodule is cancer, we may need to get more images before your next yearly screening exam. If the nodule has suspicious features (for example, it is large, has an odd shape or grows over time), we will refer you to a specialist for further testing.  Will my doctor also get the results?  Yes. Your doctor will get a copy of your results.  Is it okay to keep smoking now that there s a cancer screening exam?  No. Tobacco is one of the strongest cancer-causing agents. It causes not only lung cancer, but other cancers and cardiovascular (heart) diseases as well. The damage caused by smoking builds over time. This means that the longer you smoke, the higher your risk of disease. While it is never too late to quit, the sooner you quit, the better.  Where can I find help to quit smoking?  The best way to prevent lung cancer is to stop smoking. If you have already quit smoking, congratulations and keep it up! For help on quitting smoking, please call [employer] at 5-866-QUITNOW (1-451.750.4114) or the American Cancer Society at 1-702.475.6281 to find local resources near you.  One-on-one health coaching:  If you d prefer to work individually with a health care provider on tobacco cessation, we offer:      Medication Therapy Management:  Our specially trained pharmacists work closely with you and your doctor to help you quit smoking.  Call [phone] or  [phone] (toll free).

## 2024-05-24 ENCOUNTER — PATIENT OUTREACH (OUTPATIENT)
Dept: CARE COORDINATION | Facility: CLINIC | Age: 64
End: 2024-05-24
Payer: MEDICARE

## 2024-05-24 ENCOUNTER — PATIENT OUTREACH (OUTPATIENT)
Dept: GASTROENTEROLOGY | Facility: CLINIC | Age: 64
End: 2024-05-24
Payer: MEDICARE

## 2024-05-24 LAB
ANION GAP SERPL CALCULATED.3IONS-SCNC: 8 MMOL/L (ref 7–15)
BUN SERPL-MCNC: 8.3 MG/DL (ref 8–23)
CALCIUM SERPL-MCNC: 9.3 MG/DL (ref 8.8–10.2)
CHLORIDE SERPL-SCNC: 104 MMOL/L (ref 98–107)
CHOLEST SERPL-MCNC: 139 MG/DL
CREAT SERPL-MCNC: 0.75 MG/DL (ref 0.51–0.95)
DEPRECATED HCO3 PLAS-SCNC: 29 MMOL/L (ref 22–29)
EGFRCR SERPLBLD CKD-EPI 2021: 88 ML/MIN/1.73M2
FASTING STATUS PATIENT QL REPORTED: YES
FASTING STATUS PATIENT QL REPORTED: YES
GLUCOSE SERPL-MCNC: 99 MG/DL (ref 70–99)
HDLC SERPL-MCNC: 57 MG/DL
LDLC SERPL CALC-MCNC: 64 MG/DL
NONHDLC SERPL-MCNC: 82 MG/DL
POTASSIUM SERPL-SCNC: 5.3 MMOL/L (ref 3.4–5.3)
SODIUM SERPL-SCNC: 141 MMOL/L (ref 135–145)
TRIGL SERPL-MCNC: 89 MG/DL

## 2024-05-24 NOTE — LETTER
[facility] CARE COORDINATION  [address]  [facility] [address]    May 28, 2024    Joann Gonzalez  [address]   [facility] [address]      Dear Joann,    I am a clinic community health worker who works with Gianluca Fletcher MD with the [facility]. I wanted to thank you for spending the time to talk with me.  Below is a description of clinic care coordination and how I can further assist you.       The clinic care coordination team is made up of a registered nurse, , financial resource worker and community health worker who understand the health care system. The goal of clinic care coordination is to help you manage your health and improve access to the health care system. Our team works alongside your provider to assist you in determining your health and social needs. We can help you obtain health care and community resources, providing you with necessary information and education. We can work with you through any barriers and develop a care plan that helps coordinate and strengthen the communication between you and your care team.  Our services are voluntary and are offered without charge to you personally.    Please feel free to contact me with any questions or concerns regarding care coordination and what we can offer.      We are focused on providing you with the highest-quality healthcare experience possible.    Sincerely,     Kristen Cabrera, SELMA  Clinic Care Coordination  [facility]: [place], [place], [place], and [facility]  Phone: [phone]

## 2024-05-24 NOTE — PROGRESS NOTES
Clinic Care Coordination Contact  [facility]/Voicemail    Clinical Data: Care Coordinator Outreach    Outreach Documentation Number of Outreach Attempt   5/24/2024   8:41 AM 1       Reason for Referral:  SDOH Concern    Unable to leave a message on patient's voicemail with call back information and requested return call.  Voicemail not available.    Plan: Care Coordinator will try to reach patient again in 1-2 business days.    PATRICIA Edmonds  Clinic Care Coordination  [facility] Clinics: [place], [place], [place], and [facility]  Phone: [phone]

## 2024-05-28 NOTE — PROGRESS NOTES
Clinic Care Coordination Contact  Community Health Worker Initial Outreach    CHW Initial Information Gathering:  Referral Source: PCP    Reason for Referral:  SDOH Concern      Patient accepts CC: No, not interested. Patient will be sent Care Coordination introduction letter for future reference.     Plan: Care Coordinator will send care coordination introduction letter with care coordinator contact information and explanation of care coordination services via mail. Care Coordinator will do no further outreaches at this time.    PATRICIA Edmonds  Clinic Care Coordination  [facility] Clinics: [place], [place], [place], and [facility]  Phone: [phone]

## 2025-04-21 DIAGNOSIS — I10 BENIGN ESSENTIAL HYPERTENSION: ICD-10-CM

## 2025-04-21 DIAGNOSIS — F41.1 GAD (GENERALIZED ANXIETY DISORDER): ICD-10-CM

## 2025-04-21 RX ORDER — QUETIAPINE FUMARATE 100 MG/1
TABLET, FILM COATED ORAL
Qty: 90 TABLET | Refills: 0 | Status: SHIPPED | OUTPATIENT
Start: 2025-04-21

## 2025-04-21 RX ORDER — AMLODIPINE BESYLATE 10 MG/1
10 TABLET ORAL DAILY
Qty: 90 TABLET | Refills: 0 | Status: SHIPPED | OUTPATIENT
Start: 2025-04-21

## 2025-04-21 NOTE — TELEPHONE ENCOUNTER
Medication Question or Refill        What medication are you calling about (include dose and sig)?: Quetiapin (Seroquel) 100 mg      Amlodipine 10 mg    Preferred Pharmacy:   [id number] - [place], [place] - [address] AT [facility] [address]  [facility] [address]  Phone: [phone] Fax: [phone]      Controlled Substance Agreement on file:   CSA -- Patient Level:    CSA: None found at the patient level.       Who prescribed the medication?: Reithof    Do you need a refill? Yes    When did you use the medication last? 4/21/25    Patient offered an appointment? No But pt has appt scheduled for 6/4/25    Do you have any questions or concerns?  Yes: Pt will run out before next appt      Okay to leave a detailed message?: Yes at Cell number on file:    Telephone Information:   Mobile [phone]

## 2025-06-04 ENCOUNTER — OFFICE VISIT (OUTPATIENT)
Dept: INTERNAL MEDICINE | Facility: CLINIC | Age: 65
End: 2025-06-04
Payer: MEDICARE

## 2025-06-04 VITALS
OXYGEN SATURATION: 96 % | HEIGHT: 59 IN | DIASTOLIC BLOOD PRESSURE: 69 MMHG | RESPIRATION RATE: 18 BRPM | TEMPERATURE: 98.5 F | BODY MASS INDEX: 28.71 KG/M2 | HEART RATE: 63 BPM | WEIGHT: 142.4 LBS | SYSTOLIC BLOOD PRESSURE: 124 MMHG

## 2025-06-04 DIAGNOSIS — F41.1 GAD (GENERALIZED ANXIETY DISORDER): ICD-10-CM

## 2025-06-04 DIAGNOSIS — Z00.00 ENCOUNTER FOR MEDICARE ANNUAL WELLNESS EXAM: ICD-10-CM

## 2025-06-04 DIAGNOSIS — F33.9 RECURRENT MAJOR DEPRESSIVE DISORDER, REMISSION STATUS UNSPECIFIED: ICD-10-CM

## 2025-06-04 DIAGNOSIS — Z86.73 HISTORY OF CVA (CEREBROVASCULAR ACCIDENT): ICD-10-CM

## 2025-06-04 DIAGNOSIS — I25.10 CORONARY ARTERY CALCIFICATION SEEN ON CAT SCAN: ICD-10-CM

## 2025-06-04 DIAGNOSIS — Z13.6 SCREENING FOR CARDIOVASCULAR CONDITION: ICD-10-CM

## 2025-06-04 DIAGNOSIS — Z12.31 VISIT FOR SCREENING MAMMOGRAM: Primary | ICD-10-CM

## 2025-06-04 DIAGNOSIS — E78.5 HYPERLIPIDEMIA LDL GOAL <100: ICD-10-CM

## 2025-06-04 DIAGNOSIS — Z78.0 ASYMPTOMATIC POSTMENOPAUSAL STATUS: ICD-10-CM

## 2025-06-04 DIAGNOSIS — I10 BENIGN ESSENTIAL HYPERTENSION: ICD-10-CM

## 2025-06-04 LAB
ANION GAP SERPL CALCULATED.3IONS-SCNC: 9 MMOL/L (ref 7–15)
BUN SERPL-MCNC: 10.3 MG/DL (ref 8–23)
CALCIUM SERPL-MCNC: 9.4 MG/DL (ref 8.8–10.4)
CHLORIDE SERPL-SCNC: 103 MMOL/L (ref 98–107)
CHOLEST SERPL-MCNC: 150 MG/DL
CREAT SERPL-MCNC: 0.71 MG/DL (ref 0.51–0.95)
EGFRCR SERPLBLD CKD-EPI 2021: >90 ML/MIN/1.73M2
FASTING STATUS PATIENT QL REPORTED: YES
FASTING STATUS PATIENT QL REPORTED: YES
GLUCOSE SERPL-MCNC: 87 MG/DL (ref 70–99)
HCO3 SERPL-SCNC: 28 MMOL/L (ref 22–29)
HDLC SERPL-MCNC: 59 MG/DL
LDLC SERPL CALC-MCNC: 70 MG/DL
NONHDLC SERPL-MCNC: 91 MG/DL
POTASSIUM SERPL-SCNC: 4.8 MMOL/L (ref 3.4–5.3)
SODIUM SERPL-SCNC: 140 MMOL/L (ref 135–145)
TRIGL SERPL-MCNC: 104 MG/DL

## 2025-06-04 PROCEDURE — 80061 LIPID PANEL: CPT | Performed by: INTERNAL MEDICINE

## 2025-06-04 PROCEDURE — 80048 BASIC METABOLIC PNL TOTAL CA: CPT | Performed by: INTERNAL MEDICINE

## 2025-06-04 PROCEDURE — 36415 COLL VENOUS BLD VENIPUNCTURE: CPT | Performed by: INTERNAL MEDICINE

## 2025-06-04 RX ORDER — FLUOXETINE HYDROCHLORIDE 40 MG/1
40 CAPSULE ORAL DAILY
Qty: 90 CAPSULE | Refills: 3 | Status: SHIPPED | OUTPATIENT
Start: 2025-06-04

## 2025-06-04 RX ORDER — ATORVASTATIN CALCIUM 40 MG/1
40 TABLET, FILM COATED ORAL DAILY
Qty: 90 TABLET | Refills: 3 | Status: SHIPPED | OUTPATIENT
Start: 2025-06-04

## 2025-06-04 RX ORDER — CLOPIDOGREL BISULFATE 75 MG/1
TABLET ORAL
Qty: 90 TABLET | Refills: 3 | Status: SHIPPED | OUTPATIENT
Start: 2025-06-04

## 2025-06-04 RX ORDER — QUETIAPINE FUMARATE 25 MG/1
25 TABLET, FILM COATED ORAL 2 TIMES DAILY
Qty: 180 TABLET | Refills: 3 | Status: SHIPPED | OUTPATIENT
Start: 2025-06-04

## 2025-06-04 RX ORDER — LISINOPRIL 20 MG/1
20 TABLET ORAL DAILY
Qty: 90 TABLET | Refills: 3 | Status: SHIPPED | OUTPATIENT
Start: 2025-06-04

## 2025-06-04 RX ORDER — AMLODIPINE BESYLATE 10 MG/1
10 TABLET ORAL DAILY
Qty: 90 TABLET | Refills: 3 | Status: SHIPPED | OUTPATIENT
Start: 2025-06-04

## 2025-06-04 RX ORDER — QUETIAPINE FUMARATE 100 MG/1
TABLET, FILM COATED ORAL
Qty: 90 TABLET | Refills: 3 | Status: SHIPPED | OUTPATIENT
Start: 2025-06-04

## 2025-06-04 SDOH — HEALTH STABILITY: PHYSICAL HEALTH: ON AVERAGE, HOW MANY MINUTES DO YOU ENGAGE IN EXERCISE AT THIS LEVEL?: 0 MIN

## 2025-06-04 SDOH — HEALTH STABILITY: PHYSICAL HEALTH: ON AVERAGE, HOW MANY DAYS PER WEEK DO YOU ENGAGE IN MODERATE TO STRENUOUS EXERCISE (LIKE A BRISK WALK)?: 0 DAYS

## 2025-06-04 ASSESSMENT — ANXIETY QUESTIONNAIRES
2. NOT BEING ABLE TO STOP OR CONTROL WORRYING: NOT AT ALL
5. BEING SO RESTLESS THAT IT IS HARD TO SIT STILL: NOT AT ALL
4. TROUBLE RELAXING: NOT AT ALL
IF YOU CHECKED OFF ANY PROBLEMS ON THIS QUESTIONNAIRE, HOW DIFFICULT HAVE THESE PROBLEMS MADE IT FOR YOU TO DO YOUR WORK, TAKE CARE OF THINGS AT HOME, OR GET ALONG WITH OTHER PEOPLE: NOT DIFFICULT AT ALL
7. FEELING AFRAID AS IF SOMETHING AWFUL MIGHT HAPPEN: NOT AT ALL
3. WORRYING TOO MUCH ABOUT DIFFERENT THINGS: NOT AT ALL
GAD7 TOTAL SCORE: 0
6. BECOMING EASILY ANNOYED OR IRRITABLE: NOT AT ALL
7. FEELING AFRAID AS IF SOMETHING AWFUL MIGHT HAPPEN: NOT AT ALL
1. FEELING NERVOUS, ANXIOUS, OR ON EDGE: NOT AT ALL
GAD7 TOTAL SCORE: 0
8. IF YOU CHECKED OFF ANY PROBLEMS, HOW DIFFICULT HAVE THESE MADE IT FOR YOU TO DO YOUR WORK, TAKE CARE OF THINGS AT HOME, OR GET ALONG WITH OTHER PEOPLE?: NOT DIFFICULT AT ALL
GAD7 TOTAL SCORE: 0

## 2025-06-04 ASSESSMENT — PATIENT HEALTH QUESTIONNAIRE - PHQ9
SUM OF ALL RESPONSES TO PHQ QUESTIONS 1-9: 1
SUM OF ALL RESPONSES TO PHQ QUESTIONS 1-9: 1
10. IF YOU CHECKED OFF ANY PROBLEMS, HOW DIFFICULT HAVE THESE PROBLEMS MADE IT FOR YOU TO DO YOUR WORK, TAKE CARE OF THINGS AT HOME, OR GET ALONG WITH OTHER PEOPLE: NOT DIFFICULT AT ALL

## 2025-06-04 ASSESSMENT — SOCIAL DETERMINANTS OF HEALTH (SDOH): HOW OFTEN DO YOU GET TOGETHER WITH FRIENDS OR RELATIVES?: TWICE A WEEK

## 2025-06-04 NOTE — PATIENT INSTRUCTIONS
Patient Education   Preventive Care Advice   This is general advice given by our system to help you stay healthy. However, your care team may have specific advice just for you. Please talk to your care team about your preventive care needs.  Nutrition  Eat 5 or more servings of fruits and vegetables each day.  Try wheat bread, brown rice and whole grain pasta (instead of white bread, rice, and pasta).  Get enough calcium and vitamin D. Check the label on foods and aim for 100% of the RDA (recommended daily allowance).  Lifestyle  Exercise at least 150 minutes each week  (30 minutes a day, 5 days a week).  Do muscle strengthening activities 2 days a week. These help control your weight and prevent disease.  No smoking.  Wear sunscreen to prevent skin cancer.  Have a dental exam and cleaning every 6 months.  Yearly exams  See your health care team every year to talk about:  Any changes in your health.  Any medicines your care team has prescribed.  Preventive care, family planning, and ways to prevent chronic diseases.  Shots (vaccines)   HPV shots (up to age 26), if you've never had them before.  Hepatitis B shots (up to age 59), if you've never had them before.  COVID-19 shot: Get this shot when it's due.  Flu shot: Get a flu shot every year.  Tetanus shot: Get a tetanus shot every 10 years.  Pneumococcal, hepatitis A, and RSV shots: Ask your care team if you need these based on your risk.  Shingles shot (for age 50 and up)  General health tests  Diabetes screening:  Starting at age 35, Get screened for diabetes at least every 3 years.  If you are younger than age 35, ask your care team if you should be screened for diabetes.  Cholesterol test: At age 39, start having a cholesterol test every 5 years, or more often if advised.  Bone density scan (DEXA): At age 50, ask your care team if you should have this scan for osteoporosis (brittle bones).  Hepatitis C: Get tested at least once in your life.  STIs (sexually  transmitted infections)  Before age 24: Ask your care team if you should be screened for STIs.  After age 24: Get screened for STIs if you're at risk. You are at risk for STIs (including HIV) if:  You are sexually active with more than one person.  You don't use condoms every time.  You or a partner was diagnosed with a sexually transmitted infection.  If you are at risk for HIV, ask about PrEP medicine to prevent HIV.  Get tested for HIV at least once in your life, whether you are at risk for HIV or not.  Cancer screening tests  Cervical cancer screening: If you have a cervix, begin getting regular cervical cancer screening tests starting at age 21.  Breast cancer scan (mammogram): If you've ever had breasts, begin having regular mammograms starting at age 40. This is a scan to check for breast cancer.  Colon cancer screening: It is important to start screening for colon cancer at age 45.  Have a colonoscopy test every 10 years (or more often if you're at risk) Or, ask your provider about stool tests like a FIT test every year or Cologuard test every 3 years.  To learn more about your testing options, visit:   .  For help making a decision, visit:   https://bit.ly/ov56951.  Prostate cancer screening test: If you have a prostate, ask your care team if a prostate cancer screening test (PSA) at age 55 is right for you.  Lung cancer screening: If you are a current or former smoker ages 50 to 80, ask your care team if ongoing lung cancer screenings are right for you.  For informational purposes only. Not to replace the advice of your health care provider. Copyright   2023 [facility]. All rights reserved. Clinically reviewed by the [facility]. Dip[employer] 277137 - REV 01/24.  Preventing Falls: Care Instructions  Injuries and health problems such as trouble walking or poor eyesight can increase your risk of falling. So can some medicines. But there are things you can do to help  "prevent falls. You can exercise to get stronger. You can also arrange your home to make it safer.    Talk to your doctor about the medicines you take. Ask if any of them increase the risk of falls and whether they can be changed or stopped.   Try to exercise regularly. It can help improve your strength and balance. This can help lower your risk of falling.         Practice fall safety and prevention.   Wear low-heeled shoes that fit well and give your feet good support. Talk to your doctor if you have foot problems that make this hard.  Carry a cellphone or wear a medical alert device that you can use to call for help.  Use stepladders instead of chairs to reach high objects. Don't climb if you're at risk for falls. Ask for help, if needed.  Wear the correct eyeglasses, if you need them.        Make your home safer.   Remove rugs, cords, clutter, and furniture from walkways.  Keep your house well lit. Use night-lights in hallways and bathrooms.  Install and use sturdy handrails on stairways.  Wear nonskid footwear, even inside. Don't walk barefoot or in socks without shoes.        Be safe outside.   Use handrails, curb cuts, and ramps whenever possible.  Keep your hands free by using a shoulder bag or backpack.  Try to walk in well-lit areas. Watch out for uneven ground, changes in pavement, and debris.  Be careful in the winter. Walk on the grass or gravel when sidewalks are slippery. Use de-icer on steps and walkways. Add non-slip devices to shoes.    Put grab bars and nonskid mats in your shower or tub and near the toilet. Try to use a shower chair or bath bench when bathing.   Get into a tub or shower by putting in your weaker leg first. Get out with your strong side first. Have a phone or medical alert device in the bathroom with you.   Where can you learn more?  Go to https://www.[employer].net/patiented  Enter G117 in the search box to learn more about \"Preventing Falls: Care Instructions.\"  Current as of: " July 31, 2024  Content Version: 14.4    1166-1847 [employer].   Care instructions adapted under license by your healthcare professional. If you have questions about a medical condition or this instruction, always ask your healthcare professional. [employer] disclaims any warranty or liability for your use of this information.

## 2025-06-04 NOTE — PROGRESS NOTES
"Preventive Care Visit  [facility]  Silvio Macias MD, Internal Medicine  Jun 4, 2025      Assessment & Plan       Encounter for Medicare annual wellness exam  Visit for screening mammogram  Mammogram ordered     Benign essential hypertension   Well-controlled    Medications refilled      Coronary artery calcification seen on CAT scan         PAU (generalized anxiety disorder)  Recurrent major depressive disorder, remission status unspecified  Stable, medications refilled    History of CVA (cerebrovascular accident) 10-12 w residual Rtdominant  sided weakness    - clopidogrel (PLAVIX) 75 MG tablet  Dispense: 90 tablet; Refill: 3  - Bath Seat/Shower Chair Order for DME - ONLY FOR DME    Hyperlipidemia LDL goal <100  Lipitor refilled    Patient has been advised of split billing requirements and indicates understanding: Yes        BMI  Estimated body mass index is 28.76 kg/m  as calculated from the following:    Height as of this encounter: 1.499 m (4' 11\").    Weight as of this encounter: 64.6 kg (142 lb 6.4 oz).       Counseling  Appropriate preventive services were addressed with this patient via screening, questionnaire, or discussion as appropriate for fall prevention, nutrition, physical activity, Tobacco-use cessation, social engagement, weight loss and cognition.  Checklist reviewing preventive services available has been given to the patient.  Reviewed patient's diet, addressing concerns and/or questions.   The patient was instructed to see the dentist every 6 months.       Follow-up    Follow-up Visit   Expected date:  Jun 11, 2026 (Approximate)      Follow Up Appointment Details:     Follow-up with whom?: PCP    Follow-Up for what?: Medicare Wellness    Welcome or Annual?: Annual Wellness    How?: In Person                 Mi David is a 64 year old, presenting for the following:  Physical    Small stye on the right eye   Replace shower chair- pt needs this due to stroke in " 2012   Renewals on all meds- list is current     HPI       Stye on the right eyelid is already improving.    History of stroke in 2012, has balance problems and some residual right-sided weakness.        Advance Care Planning            6/4/2025   General Health   How would you rate your overall physical health? Good   Feel stress (tense, anxious, or unable to sleep) Not at all         6/4/2025   Nutrition   Diet: Regular (no restrictions)         6/4/2025   Exercise   Days per week of moderate/strenous exercise 0 days   Average minutes spent exercising at this level 0 min   (!) EXERCISE CONCERN      6/4/2025   Social Factors   Frequency of gathering with friends or relatives Twice a week   Worry food won't last until get money to buy more No   Food not last or not have enough money for food? No   Do you have housing? (Housing is defined as stable permanent housing and does not include staying outside in a car, in a tent, in an abandoned building, in an overnight shelter, or couch-surfing.) Yes   Are you worried about losing your housing? No   Lack of transportation? No   Unable to get utilities (heat,electricity)? No         6/4/2025   Fall Risk   Fallen 2 or more times in the past year? No    Trouble with walking or balance? Yes        Proxy-reported           6/4/2025   Activities of Daily Living- Home Safety   Needs help with the following daily activites None of the above   Safety concerns in the home None of the above         6/4/2025   Dental   Dentist two times every year? (!) NO         6/4/2025   Hearing Screening   Hearing concerns? None of the above         6/4/2025   Driving Risk Screening   Patient/family members have concerns about driving No         6/4/2025   General Alertness/Fatigue Screening   Have you been more tired than usual lately? No         6/4/2025   Urinary Incontinence Screening   Bothered by leaking urine in past 6 months No       Today's PHQ-9 Score:       6/4/2025     1:44 PM   PHQ-9  SCORE   PHQ-9 Total Score MyChart 1 (Minimal depression)   PHQ-9 Total Score 1        Proxy-reported         2025   Substance Use   Alcohol more than 3/day or more than 7/wk Not Applicable   Do you have a current opioid prescription? No   How severe/bad is pain from 1 to 10? 0/10 (No Pain)   Do you use any other substances recreationally? 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     Drug use: No          Mammogram Screening - Mammogram every 1-2 years updated in Health Maintenance based on mutual decision making      History of abnormal Pap smear: No - age 65 or older with adequate negative prior screening test results (3 consecutive negative cytology results, 2 consecutive negative cotesting results, or 2 consecutive negative HrHPV test results within 10 years, with the most recent test occurring within the recommended screening interval for the test used)       ASCVD Risk   The ASCVD Risk score (Ramírez DK, et al., 2019) failed to calculate for the following reasons:    Risk score cannot be calculated because patient has a medical history suggesting prior/existing ASCVD              Reviewed and updated as needed this visit by Provider                    Past Medical History:   Diagnosis Date    Depression     Hypertension     Migraine 2013    Overdose     Stroke (H)     Unspecified cerebral artery occlusion with cerebral infarction 10/20/2012     Current providers sharing in care for this patient include:  Patient Care Team:  Gianluca Fletcher MD as PCP - General (Internal Medicine)  Chetna Kaufman RN [facility] as Clinic Care Coordinator  Esau Ospina MD  "(Family Practice)  Gianluca Fletcher MD as Assigned PCP    The following health maintenance items are reviewed in Epic and correct as of today:  Health Maintenance   Topic Date Due    DEXA  Never done    ZOSTER VACCINE (1 of 2) Never done    LUNG CANCER SCREENING  Never done    RSV VACCINE (1 - Risk 60-74 years 1-dose series) Never done    MAMMO SCREENING  03/01/2019    COLORECTAL CANCER SCREENING  09/14/2022    COVID-19 VACCINE (6 - 2024-25 season) 09/01/2024    DTAP/TDAP/TD VACCINE (4 - Td or Tdap) 04/05/2025    BMP  05/23/2025    LIPID  05/23/2025    ANNUAL REVIEW OF HM ORDERS  05/23/2025    MEDICARE ANNUAL WELLNESS VISIT  05/23/2025    INFLUENZA VACCINE (Season Ended) 09/01/2025    PHQ-9  12/04/2025    PAU ASSESSMENT  06/04/2026    FALL RISK ASSESSMENT  06/04/2026    DIABETES SCREENING  05/23/2027    PNEUMOCOCCAL VACCINE 50+ YEARS (3 of 3 - PCV20 or PCV21) 02/06/2028    ADVANCE CARE PLANNING  05/23/2029    HEPATITIS C SCREENING  Completed    DEPRESSION ACTION PLAN  Completed    MIGRAINE ACTION PLAN  Completed    HPV VACCINE  Aged Out    MENINGITIS VACCINE  Aged Out         Review of Systems  Constitutional, HEENT, cardiovascular, pulmonary, GI, , musculoskeletal, neuro, skin, endocrine and psych systems are negative, except as otherwise noted.     Objective    Exam  /69   Pulse 63   Temp 98.5  F (36.9  C) (Temporal)   Resp 18   Ht 1.499 m (4' 11\")   Wt 64.6 kg (142 lb 6.4 oz)   LMP  (LMP Unknown)   SpO2 96%   BMI 28.76 kg/m     Estimated body mass index is 28.76 kg/m  as calculated from the following:    Height as of this encounter: 1.499 m (4' 11\").    Weight as of this encounter: 64.6 kg (142 lb 6.4 oz).    Physical Exam  GENERAL: alert and no distress  EYES: Stye right upper eyelid, PERRL and conjunctivae and sclerae normal  HENT: ear canals and TM's normal, nose and mouth without ulcers or lesions  NECK: no adenopathy, no asymmetry, masses, or scars  RESP: lungs clear to auscultation - no " rales, rhonchi or wheezes  CV: regular rate and rhythm, normal S1 S2, no S3 or S4, no murmur, click or rub, no peripheral edema  ABDOMEN: soft, nontender, no hepatosplenomegaly, no masses and bowel sounds normal  MS: no gross musculoskeletal defects noted, no edema  SKIN: no suspicious lesions or rashes  NEURO: Normal strength and tone, mentation intact and speech normal  PSYCH: mentation appears normal, affect normal/bright         6/4/2025   Mini Cog   Clock Draw Score 2 Normal   3 Item Recall 1 object recalled   Mini Cog Total Score 3              Signed Electronically by: Silvio Macias MD

## 2025-06-05 ENCOUNTER — PATIENT OUTREACH (OUTPATIENT)
Dept: CARE COORDINATION | Facility: CLINIC | Age: 65
End: 2025-06-05
Payer: MEDICARE

## 2025-06-05 ENCOUNTER — RESULTS FOLLOW-UP (OUTPATIENT)
Dept: INTERNAL MEDICINE | Facility: CLINIC | Age: 65
End: 2025-06-05

## 2025-06-17 ENCOUNTER — HOSPITAL ENCOUNTER (OUTPATIENT)
Age: 65
End: 2025-06-17
Payer: MEDICARE

## 2025-06-17 ENCOUNTER — TELEPHONE (OUTPATIENT)
Dept: GASTROENTEROLOGY | Facility: CLINIC | Age: 65
End: 2025-06-17
Payer: MEDICARE

## 2025-06-17 NOTE — TELEPHONE ENCOUNTER
"Endoscopy Scheduling Screen    Caller: patient    Have you had any respiratory illness or flu-like symptoms in the last 10 days?  No    What is your communication preference for Instructions and/or Bowel Prep?   Mail/USPS    What insurance is in the chart?  Other:  MEDICARE    Ordering/Referring Provider:     KERRY LARSON      (If ordering provider performs procedure, schedule with ordering provider unless otherwise instructed. )    BMI: Estimated body mass index is 28.76 kg/m  as calculated from the following:    Height as of 6/4/25: 1.499 m (4' 11\").    Weight as of 6/4/25: 64.6 kg (142 lb 6.4 oz).     Sedation Ordered  MAC/deep sedation.   BMI<= 45 45 < BMI <= 48 48 < BMI < = 50  BMI > 50   No Restrictions No [facility]  No [facility]  [facility] with exceptions Hospital Only OR Only       Do you have a history of malignant hyperthermia?  No    (Females) Are you currently pregnant?        Have you been diagnosed or told you have pulmonary hypertension?   No    Do you have an LVAD?  No    Have you been told you have moderate to severe sleep apnea?  No.    Have you been told you have COPD, asthma, or any other lung disease?  No    Has your doctor ordered any cardiac tests like echo, angiogram, stress test, ablation, or EKG, that you have not completed yet?  No    Do you  have a history of any heart conditions?  No     Have you ever had or are you waiting for an organ transplant?  No. Continue scheduling, no site restrictions.    Have you had a stroke or transient ischemic attack (TIA aka \"mini stroke\") in the last 2 years?   No.    Have you been diagnosed with or been told you have cirrhosis of the liver?   No.    Are you currently on dialysis?   No    Do you need assistance transferring?   No    BMI: Estimated body mass index is 28.76 kg/m  as calculated from the following:    Height as of 6/4/25: 1.499 m (4' 11\").    Weight as of 6/4/25: 64.6 kg (142 lb 6.4 oz).     Is patients BMI > 40 and scheduling location " [facility]?  No    Do you take an injectable or oral medication for weight loss or diabetes (excluding insulin)?  No    Do you take the medication Naltrexone?  No    Do you take blood thinners?  Yes, you must contact your prescribing provider for directions on holding or bridging with a different medication.       Prep   Are you currently on dialysis or do you have chronic kidney disease?  No    Do you have a diagnosis of diabetes?  No    Do you have a diagnosis of cystic fibrosis (CF)?  No    On a regular basis do you go 3 -5 days between bowel movements?  No    BMI > 40?  No    Preferred Pharmacy:    [id number] - [place], [place] - [address] AT [facility] [address]  [facility] [address]  Phone: [phone] Fax: [phone]      Final Scheduling Details     Procedure scheduled  Colonoscopy    Surgeon:  PEE     Date of procedure:  8/27     Pre-OP / PAC:   No - Not required for this site.    Location  [facility] - Patient preference.    Sedation   MAC/Deep Sedation - Per order.      Patient Reminders:   You will receive a call from a Nurse to review instructions and health history.  This assessment must be completed prior to your procedure.  Failure to complete the Nurse assessment may result in the procedure being cancelled.      On the day of your procedure, please designate an adult(s) who can drive you home stay with you for the next 24 hours. The medicines used in the exam will make you sleepy. You will not be able to drive.      You cannot take public transportation, ride share services, or non-medical taxi service without a responsible caregiver.  Medical transport services are allowed with the requirement that a responsible caregiver will receive you at your destination.  We require that drivers and caregivers are confirmed prior to your procedure.

## 2025-08-05 DIAGNOSIS — I10 BENIGN ESSENTIAL HYPERTENSION: ICD-10-CM

## 2025-08-05 DIAGNOSIS — Z86.73 HISTORY OF CVA (CEREBROVASCULAR ACCIDENT): ICD-10-CM

## 2025-08-05 RX ORDER — CLOPIDOGREL BISULFATE 75 MG/1
TABLET ORAL
Qty: 90 TABLET | Refills: 3 | OUTPATIENT
Start: 2025-08-05

## 2025-08-05 RX ORDER — LISINOPRIL 20 MG/1
20 TABLET ORAL DAILY
Qty: 90 TABLET | Refills: 3 | OUTPATIENT
Start: 2025-08-05

## 2025-08-19 ENCOUNTER — TELEPHONE (OUTPATIENT)
Dept: GASTROENTEROLOGY | Facility: CLINIC | Age: 65
End: 2025-08-19
Payer: MEDICARE

## (undated) DEVICE — SU VICRYL 3-0 TIE 12X18" J904T

## (undated) DEVICE — DRSG STERI STRIP 1/2X4" R1547

## (undated) DEVICE — BLADE KNIFE SURG 15 371115

## (undated) DEVICE — LINEN TOWEL PACK X5 5464

## (undated) DEVICE — SU VICRYL 2-0 TIE 12X18" J905T

## (undated) DEVICE — SU VICRYL 3-0 SH 27" J316H

## (undated) DEVICE — ESU GROUND PAD UNIVERSAL W/O CORD

## (undated) DEVICE — PACK LAP CHOLE SLC15LCFSD

## (undated) DEVICE — PREP CHLORAPREP 26ML TINTED ORANGE  260815

## (undated) DEVICE — GLOVE PROTEXIS MICRO 6.0  2D73PM60

## (undated) DEVICE — ESU HOLDER LAP INST DISP PURPLE LONG 330MM H-PRO-330

## (undated) DEVICE — SU PDS II 0 CTX 60" Z990G

## (undated) DEVICE — SOL WATER IRRIG 1000ML BOTTLE 2F7114

## (undated) DEVICE — GLOVE PROTEXIS BLUE W/NEU-THERA 6.5  2D73EB65

## (undated) DEVICE — SOL NACL 0.9% INJ 1000ML BAG 2B1324X

## (undated) DEVICE — STPL LINEAR CUT 75MM TLC75

## (undated) DEVICE — SU VICRYL 0 UR-6 27" J603H

## (undated) DEVICE — SU MONOCRYL 4-0 PS-2 18" UND Y496G

## (undated) DEVICE — SU MONOCRYL 3-0 PS-2 27" Y427H

## (undated) DEVICE — DRSG GAUZE 4X4" 3033

## (undated) DEVICE — ESU ELEC BLADE 6" COATED E1450-6

## (undated) DEVICE — DRSG BANDAID 1X3" FABRIC LATEX FREE

## (undated) DEVICE — SUCTION CANISTER MEDIVAC LINER 3000ML W/LID 65651-530

## (undated) DEVICE — ENDO TROCAR SLEEVE KII Z-THREADED 05X100MM CTS02

## (undated) DEVICE — PACK MAJOR SBA15MAFSI

## (undated) DEVICE — ESU LIGASURE MARYLAND LAPAROSCOPIC SLR/DVDR 5MMX37CM LF1937

## (undated) DEVICE — SUCTION IRR STRYKERFLOW II W/TIP 250-070-520

## (undated) DEVICE — SU PROLENE 2-0 SHDA 48" 8533H

## (undated) DEVICE — ENDO TROCAR FIRST ENTRY KII FIOS Z-THRD 05X100MM CTF03

## (undated) RX ORDER — HYDROMORPHONE HYDROCHLORIDE 1 MG/ML
INJECTION, SOLUTION INTRAMUSCULAR; INTRAVENOUS; SUBCUTANEOUS
Status: DISPENSED
Start: 2018-09-29

## (undated) RX ORDER — LIDOCAINE HYDROCHLORIDE 20 MG/ML
INJECTION, SOLUTION EPIDURAL; INFILTRATION; INTRACAUDAL; PERINEURAL
Status: DISPENSED
Start: 2018-09-29

## (undated) RX ORDER — PROPOFOL 10 MG/ML
INJECTION, EMULSION INTRAVENOUS
Status: DISPENSED
Start: 2018-09-29

## (undated) RX ORDER — NEOSTIGMINE METHYLSULFATE 1 MG/ML
VIAL (ML) INJECTION
Status: DISPENSED
Start: 2018-09-29

## (undated) RX ORDER — FENTANYL CITRATE 50 UG/ML
INJECTION, SOLUTION INTRAMUSCULAR; INTRAVENOUS
Status: DISPENSED
Start: 2018-09-29

## (undated) RX ORDER — VECURONIUM BROMIDE 1 MG/ML
INJECTION, POWDER, LYOPHILIZED, FOR SOLUTION INTRAVENOUS
Status: DISPENSED
Start: 2018-09-29

## (undated) RX ORDER — DEXAMETHASONE SODIUM PHOSPHATE 4 MG/ML
INJECTION, SOLUTION INTRA-ARTICULAR; INTRALESIONAL; INTRAMUSCULAR; INTRAVENOUS; SOFT TISSUE
Status: DISPENSED
Start: 2018-09-29

## (undated) RX ORDER — GLYCOPYRROLATE 0.2 MG/ML
INJECTION, SOLUTION INTRAMUSCULAR; INTRAVENOUS
Status: DISPENSED
Start: 2018-09-29

## (undated) RX ORDER — ONDANSETRON 2 MG/ML
INJECTION INTRAMUSCULAR; INTRAVENOUS
Status: DISPENSED
Start: 2018-09-29